# Patient Record
Sex: MALE | Race: WHITE | NOT HISPANIC OR LATINO | Employment: OTHER | URBAN - METROPOLITAN AREA
[De-identification: names, ages, dates, MRNs, and addresses within clinical notes are randomized per-mention and may not be internally consistent; named-entity substitution may affect disease eponyms.]

---

## 2018-09-07 ENCOUNTER — OFFICE VISIT (OUTPATIENT)
Dept: URGENT CARE | Facility: CLINIC | Age: 78
End: 2018-09-07
Payer: MEDICARE

## 2018-09-07 VITALS
TEMPERATURE: 98 F | HEART RATE: 56 BPM | SYSTOLIC BLOOD PRESSURE: 176 MMHG | HEIGHT: 66 IN | DIASTOLIC BLOOD PRESSURE: 70 MMHG | WEIGHT: 161 LBS | RESPIRATION RATE: 18 BRPM | OXYGEN SATURATION: 96 % | BODY MASS INDEX: 25.88 KG/M2

## 2018-09-07 DIAGNOSIS — S61.215A LACERATION OF LEFT RING FINGER WITHOUT FOREIGN BODY WITHOUT DAMAGE TO NAIL, INITIAL ENCOUNTER: ICD-10-CM

## 2018-09-07 DIAGNOSIS — S62.645B OPEN NONDISPLACED FRACTURE OF PROXIMAL PHALANX OF LEFT RING FINGER, INITIAL ENCOUNTER: ICD-10-CM

## 2018-09-07 DIAGNOSIS — S69.92XA FINGER INJURY, LEFT, INITIAL ENCOUNTER: Primary | ICD-10-CM

## 2018-09-07 PROCEDURE — 90714 TD VACC NO PRESV 7 YRS+ IM: CPT | Mod: S$GLB,,, | Performed by: NURSE PRACTITIONER

## 2018-09-07 PROCEDURE — 12042 INTMD RPR N-HF/GENIT2.6-7.5: CPT | Mod: S$GLB,,, | Performed by: NURSE PRACTITIONER

## 2018-09-07 PROCEDURE — 90471 IMMUNIZATION ADMIN: CPT | Mod: S$GLB,,, | Performed by: NURSE PRACTITIONER

## 2018-09-07 PROCEDURE — 99214 OFFICE O/P EST MOD 30 MIN: CPT | Mod: 25,S$GLB,, | Performed by: NURSE PRACTITIONER

## 2018-09-07 RX ORDER — HYDROCODONE BITARTRATE AND ACETAMINOPHEN 5; 325 MG/1; MG/1
1 TABLET ORAL EVERY 8 HOURS PRN
Qty: 10 TABLET | Refills: 0 | Status: SHIPPED | OUTPATIENT
Start: 2018-09-07 | End: 2018-11-22

## 2018-09-07 RX ORDER — CEPHALEXIN 500 MG/1
500 CAPSULE ORAL EVERY 8 HOURS
Qty: 15 CAPSULE | Refills: 0 | Status: SHIPPED | OUTPATIENT
Start: 2018-09-07 | End: 2018-09-12

## 2018-09-07 NOTE — PROGRESS NOTES
"Subjective:       Patient ID: Bao Mckeon is a 78 y.o. male.    Vitals:  height is 5' 6" (1.676 m) and weight is 73 kg (161 lb). His temperature is 98.3 °F (36.8 °C). His blood pressure is 176/70 (abnormal) and his pulse is 56 (abnormal). His respiration is 18 and oxygen saturation is 96%.     Chief Complaint: Laceration    Patient presents with laceration to left 4th digit after his hand became stuck between sliding door and a shopping cart in the last hour.  Here with his neighbor.  He is a Pianist by profession.        Laceration    The incident occurred less than 1 hour ago. The laceration is located on the left hand. The laceration is 4 cm in size. The laceration mechanism was a metal edge. The pain is at a severity of 4/10. The pain is moderate. The pain has been constant since onset. He reports no foreign bodies present. His tetanus status is out of date.     Review of Systems   Constitution: Negative for chills and fever.   HENT: Negative for sore throat.    Eyes: Negative for blurred vision.   Cardiovascular: Negative for chest pain.   Respiratory: Negative for shortness of breath.    Skin: Negative for rash.   Musculoskeletal: Negative for back pain and joint pain.   Gastrointestinal: Negative for abdominal pain, diarrhea, nausea and vomiting.   Neurological: Negative for headaches.   Psychiatric/Behavioral: The patient is not nervous/anxious.        Objective:      Physical Exam   Constitutional: He is oriented to person, place, and time. He appears well-developed and well-nourished.   HENT:   Head: Normocephalic and atraumatic. Head is without abrasion, without contusion and without laceration.   Right Ear: External ear normal.   Left Ear: External ear normal.   Nose: Nose normal.   Mouth/Throat: Oropharynx is clear and moist.   Eyes: Conjunctivae, EOM and lids are normal. Pupils are equal, round, and reactive to light.   Neck: Trachea normal, full passive range of motion without pain and phonation " normal. Neck supple.   Cardiovascular: Normal rate, regular rhythm, normal heart sounds and intact distal pulses.   Pulmonary/Chest: Effort normal and breath sounds normal. No stridor. No respiratory distress.   Musculoskeletal: Normal range of motion.        Left hand: He exhibits laceration. He exhibits normal range of motion, no tenderness, no bony tenderness, normal two-point discrimination, normal capillary refill, no deformity and no swelling. Normal sensation noted. Normal strength noted.        Hands:  Neurological: He is alert and oriented to person, place, and time.   Skin: Skin is warm and dry. Capillary refill takes less than 2 seconds. Laceration noted. No abrasion, no bruising, no burn, no ecchymosis, no lesion and no rash noted. No erythema.   Psychiatric: He has a normal mood and affect. His speech is normal and behavior is normal. Judgment and thought content normal. Cognition and memory are normal.   Nursing note and vitals reviewed.          EXAMINATION:  XR HAND COMPLETE 3 VIEW LEFT    CLINICAL HISTORY:  Unspecified injury of left wrist, hand and finger(s), initial encounter    TECHNIQUE:  PA, lateral, and oblique views of the left hand were performed.    COMPARISON:  None    FINDINGS:  There is a nondisplaced longitudinally oriented fracture of the 4th proximal phalanx with overlying soft tissue irregularity.  No periarticular osteopenia or erosion.  Moderate degenerative changes are seen throughout the IP and base of thumb joints.      Impression       As above.      Electronically signed by: Nathaniel Porter MD  Date: 09/07/2018  Time: 15:49       Assessment:       1. Finger injury, left, initial encounter    2. Laceration of left ring finger without foreign body without damage to nail, initial encounter    3. Open nondisplaced fracture of proximal phalanx of left ring finger, initial encounter        Plan:       Case discussed with Dr. Canada (Orth[facility] Hand Specialist) who will follow him in  clinic.  Okay to suture in clinic and stabilize with Aluminum Finger Splint, advised by MD to have him remove in 3 days to prevent stiffness, follow up in clinic next week, and start on Prophylactic Keflex.    Finger injury, left, initial encounter  -     XR HAND COMPLETE 3 VIEW LEFT; Future; Expected date: 09/07/2018  -     (In Office Administered) Td Vaccine  -     cephALEXin (KEFLEX) 500 MG capsule; Take 1 capsule (500 mg total) by mouth every 8 (eight) hours. for 5 days  Dispense: 15 capsule; Refill: 0  -     Ambulatory referral to Orthopedics  -     HYDROcodone-acetaminophen (NORCO) 5-325 mg per tablet; Take 1 tablet by mouth every 8 (eight) hours as needed for Pain.  Dispense: 10 tablet; Refill: 0    Laceration of left ring finger without foreign body without damage to nail, initial encounter  -     Laceration Repair  -     (In Office Administered) Td Vaccine  -     cephALEXin (KEFLEX) 500 MG capsule; Take 1 capsule (500 mg total) by mouth every 8 (eight) hours. for 5 days  Dispense: 15 capsule; Refill: 0  -     Ambulatory referral to Orthopedics  -     HYDROcodone-acetaminophen (NORCO) 5-325 mg per tablet; Take 1 tablet by mouth every 8 (eight) hours as needed for Pain.  Dispense: 10 tablet; Refill: 0    Open nondisplaced fracture of proximal phalanx of left ring finger, initial encounter  -     Ambulatory referral to Orthopedics  -     HYDROcodone-acetaminophen (NORCO) 5-325 mg per tablet; Take 1 tablet by mouth every 8 (eight) hours as needed for Pain.  Dispense: 10 tablet; Refill: 0      Patient Instructions   Follow up with Dr. Canada or one of his associates next week.  Call [phone] to schedule.  Take splint off in three days to move finger and then re apply to avoid stiffness.  Start antibiotic and take until completion.                           Laceration Repair   If your condition worsens or fails to improve we recommend that you receive another evaluation at the ER immediately or contact your  PCP to discuss your concerns or return here. You must understand that you've received an urgent care treatment only and that you may be released before all your medical problems are known or treated. You the patient will arrange for followup care as instructed.   Use the prescription antibiotic ointment for 2-3 days only. Clean the wound twice a day with betadiene and apply the ointment. After that, only use a dry bandage as the ointment will keep the laceration too moist.   Monitor for signs of infection such as redness, drainage, increase swelling or increase pain.   If you were given narcotics for pain do not drive or operate heavy equipment or machinery.   Tylenol or ibuprofen can also be used as directed for pain unless you have an allergy to them or medical condition such as stomach ulcers, kidney or liver disease or blood thinners etc for which you should not be taking these type of medications.       Finger Fracture, Open  You have a broken finger (fracture) with a nearby cut, puncture, or deep scrape. This causes local pain, swelling, and bruising. Because of the open injury, you are at risk for infection in the skin and bone. You will take antibiotics to lower the risk for infection.   This injury usually takes about 4 weeks to heal. Finger injuries are often treated with a splint or cast, or by taping the injured finger to the next one (edward taping). This protects the injured finger and holds the bone in position while it heals. More serious fractures may need surgery.  If the fingernail has been severely injured, it will probably fall off in 1 to 2 weeks. A new fingernail will usually start to grow back within a month.  Home care  Follow these guidelines when caring for yourself at home:  · Keep your hand elevated to reduce pain and swelling. When sitting or lying down keep your arm above the level of your heart. You can do this by placing your arm on a pillow that rests on your chest or on a pillow at  your side. This is most important during the first 2 days (48 hours) after the injury.  · Put an ice pack on the injured area. Do this for 20 minutes every 1 to 2 hours the first day for pain relief. You can make an ice pack by wrapping a plastic bag of ice cubes in a thin towel. As the ice melts, be careful that the cast or splint doesnt get wet. Continue using the ice pack 3 to 4 times a day until the pain and swelling go away.  · Keep the cast or splint completely dry at all times. Bathe with your cast or splint out of the water. Protect it with a large plastic bag, rubber-banded at the top end. If a fiberglass cast or splint gets wet, you can dry it with a hair dryer.  · You may use acetaminophen or ibuprofen to control pain, unless another pain medicine was prescribed. If you have chronic liver or kidney disease, talk with your healthcare provider before using these medicines. Also talk with your provider if youve had a stomach ulcer or gastrointestinal bleeding.  · If edward tape was applied and it becomes wet or dirty, change it. You may replace it with paper, plastic, or cloth tape. Cloth tape and paper tapes must be kept dry. Keep the edward tape in place for at least 4 weeks.  · Take all antibiotics until you have finished them.  · Dont put creams or objects under the cast if you have itching.  Follow-up care  Follow up with your healthcare provider, or as advised. This is to make sure the bone is healing the way it should.  X-rays may be taken. You will be told of any new findings that may affect your care.  When to seek medical advice  Call your healthcare provider right away if any of these occur:  · The cast or splint cracks  · The plaster cast or splint becomes wet or soft  · The fiberglass cast or splint stays wet for more than 24 hours  · Pain or swelling gets worse  · Tightness or pressure under the cast or splint gets worse  · Finger becomes cold, blue, numb, or tingly  · You cant move your  finger  · Redness, warmth, swelling, drainage from the wound, or foul odor from a cast or splint  · Fever of 100.4ºF (38ºC) or higher, or as directed by your healthcare provider   Date Last Reviewed: 2/1/2017  © 6416-8369 [employer]. [address] [place], [place] [address]. All rights reserved. This information is not intended as a substitute for professional medical care. Always follow your healthcare professional's instructions.

## 2018-09-07 NOTE — PATIENT INSTRUCTIONS
Follow up with Dr. Canada or one of his associates next week.  Call [phone] to schedule.  Take splint off in three days to move finger and then re apply to avoid stiffness.  Start antibiotic and take until completion.                           Laceration Repair   If your condition worsens or fails to improve we recommend that you receive another evaluation at the ER immediately or contact your PCP to discuss your concerns or return here. You must understand that you've received an urgent care treatment only and that you may be released before all your medical problems are known or treated. You the patient will arrange for followup care as instructed.   Use the prescription antibiotic ointment for 2-3 days only. Clean the wound twice a day with betadiene and apply the ointment. After that, only use a dry bandage as the ointment will keep the laceration too moist.   Monitor for signs of infection such as redness, drainage, increase swelling or increase pain.   If you were given narcotics for pain do not drive or operate heavy equipment or machinery.   Tylenol or ibuprofen can also be used as directed for pain unless you have an allergy to them or medical condition such as stomach ulcers, kidney or liver disease or blood thinners etc for which you should not be taking these type of medications.       Finger Fracture, Open  You have a broken finger (fracture) with a nearby cut, puncture, or deep scrape. This causes local pain, swelling, and bruising. Because of the open injury, you are at risk for infection in the skin and bone. You will take antibiotics to lower the risk for infection.   This injury usually takes about 4 weeks to heal. Finger injuries are often treated with a splint or cast, or by taping the injured finger to the next one (edward taping). This protects the injured finger and holds the bone in position while it heals. More serious fractures may need surgery.  If the fingernail has been severely  injured, it will probably fall off in 1 to 2 weeks. A new fingernail will usually start to grow back within a month.  Home care  Follow these guidelines when caring for yourself at home:  · Keep your hand elevated to reduce pain and swelling. When sitting or lying down keep your arm above the level of your heart. You can do this by placing your arm on a pillow that rests on your chest or on a pillow at your side. This is most important during the first 2 days (48 hours) after the injury.  · Put an ice pack on the injured area. Do this for 20 minutes every 1 to 2 hours the first day for pain relief. You can make an ice pack by wrapping a plastic bag of ice cubes in a thin towel. As the ice melts, be careful that the cast or splint doesnt get wet. Continue using the ice pack 3 to 4 times a day until the pain and swelling go away.  · Keep the cast or splint completely dry at all times. Bathe with your cast or splint out of the water. Protect it with a large plastic bag, rubber-banded at the top end. If a fiberglass cast or splint gets wet, you can dry it with a hair dryer.  · You may use acetaminophen or ibuprofen to control pain, unless another pain medicine was prescribed. If you have chronic liver or kidney disease, talk with your healthcare provider before using these medicines. Also talk with your provider if youve had a stomach ulcer or gastrointestinal bleeding.  · If edward tape was applied and it becomes wet or dirty, change it. You may replace it with paper, plastic, or cloth tape. Cloth tape and paper tapes must be kept dry. Keep the edward tape in place for at least 4 weeks.  · Take all antibiotics until you have finished them.  · Dont put creams or objects under the cast if you have itching.  Follow-up care  Follow up with your healthcare provider, or as advised. This is to make sure the bone is healing the way it should.  X-rays may be taken. You will be told of any new findings that may affect your  care.  When to seek medical advice  Call your healthcare provider right away if any of these occur:  · The cast or splint cracks  · The plaster cast or splint becomes wet or soft  · The fiberglass cast or splint stays wet for more than 24 hours  · Pain or swelling gets worse  · Tightness or pressure under the cast or splint gets worse  · Finger becomes cold, blue, numb, or tingly  · You cant move your finger  · Redness, warmth, swelling, drainage from the wound, or foul odor from a cast or splint  · Fever of 100.4ºF (38ºC) or higher, or as directed by your healthcare provider   Date Last Reviewed: 2/1/2017  © 2801-3153 [employer]. [address] [place] [address], [place] [address]. All rights reserved. This information is not intended as a substitute for professional medical care. Always follow your healthcare professional's instructions.

## 2018-09-07 NOTE — PROCEDURES
"Laceration Repair  Date/Time: 2018 4:49 PM  Performed by: Dotty Garcia NP  Authorized by: Dotty Garcia NP   Consent Done: Yes  Consent: Verbal consent obtained.  Risks and benefits: risks, benefits and alternatives were discussed  Consent given by: patient  Patient understanding: patient states understanding of the procedure being performed  Site marked: the operative site was marked  Imaging studies: imaging studies available  Patient identity confirmed: , name and verbally with patient  Time out: Immediately prior to procedure a "time out" was called to verify the correct patient, procedure, equipment, support staff and site/side marked as required.  Body area: upper extremity  Location details: right ring finger  Laceration length: 4 cm  Foreign bodies: no foreign bodies  Tendon involvement: none  Nerve involvement: none  Vascular damage: no  Anesthesia: local infiltration    Anesthesia:  Local Anesthetic: lidocaine 1% without epinephrine  Anesthetic total: 1 mL  Preparation: Patient was prepped and draped in the usual sterile fashion.  Irrigation solution: saline  Irrigation method: jet lavage and syringe  Amount of cleaning: extensive  Debridement: none  Degree of undermining: none  Skin closure: 4-0 nylon  Number of sutures: 12  Technique: complex  Approximation: close  Approximation difficulty: complex  Dressing: antibiotic ointment and splint for protection  Patient tolerance: Patient tolerated the procedure well with no immediate complications  Comments: Hemostasis Achieved.  Patient splinted with Aluminum Finger Splint in Flexion.  NV intact.        "

## 2018-09-10 ENCOUNTER — TELEPHONE (OUTPATIENT)
Dept: ORTHOPEDICS | Facility: CLINIC | Age: 78
End: 2018-09-10

## 2018-09-10 ENCOUNTER — TELEPHONE (OUTPATIENT)
Dept: URGENT CARE | Facility: CLINIC | Age: 78
End: 2018-09-10

## 2018-09-10 NOTE — TELEPHONE ENCOUNTER
----- Message from Sabrina Lawson sent at 9/10/2018  3:29 PM CDT -----  Contact: pt            Name of Who is Calling: Bao      What is the request in detail: requesting a call back in reference to scheduling to see Dr canada at an earlier date than Oct. Pt states that CORAZON Garcia spoke with Dr Canada and requested that he be seen soon as possible. Please call and advise      Can the clinic reply by [facility]: no      What Number to Call Back if not in [facility]: [phone]

## 2018-09-10 NOTE — TELEPHONE ENCOUNTER
----- Message from Jozef Coreas sent at 9/10/2018  2:45 PM CDT -----  Contact: patient      Name of Who is Calling: Bao Mckeon      What is the request in detail: pt is requesting a call back in reference to being seen for fractured left ring finger.  Referral is in the system from UC visit.   spoke with Dr Canada who advised on treatment and to f/u with him in clinic.      Can the clinic reply by [facility]: no      What Number to Call Back if not in [facility]: [phone]

## 2018-09-10 NOTE — TELEPHONE ENCOUNTER
----- Message from Dulce Horta sent at 9/10/2018  2:58 PM CDT -----  Contact: [phone]/self  Pt states he need to be seen within a week for a URGENT CARE follow-up for a fractured finger.     Please call and advise

## 2018-09-10 NOTE — TELEPHONE ENCOUNTER
I spoke with the patient and made him a new patient appointment. He was made aware of date, time and location.

## 2018-09-12 ENCOUNTER — TELEPHONE (OUTPATIENT)
Dept: ORTHOPEDICS | Facility: CLINIC | Age: 78
End: 2018-09-12

## 2018-09-12 NOTE — TELEPHONE ENCOUNTER
----- Message from Makenna Garcia sent at 9/12/2018  9:56 AM CDT -----  Name of Who is Calling: #SHAHRZAD SUAREZ RADHA [[id number]]      What is the request in detail: Pt has stiches in left ring finger and wants further instructions on how to care for the stiches. Stiches were done in urgent care and was advised to contact the   Dr.     Can the clinic reply by [facility]: Yes      [facility]: [phone]

## 2018-09-14 ENCOUNTER — TELEPHONE (OUTPATIENT)
Dept: ORTHOPEDICS | Facility: CLINIC | Age: 78
End: 2018-09-14

## 2018-09-14 NOTE — TELEPHONE ENCOUNTER
Spoke with patient to inform him of wound care instructions. Verbalized understanding.    ----- Message from Alba Melara sent at 9/14/2018  2:21 PM CDT -----  Contact: self/[phone]  Patient called to speak with the nurse about cleaning his wound with peroxide.    Please call and advise.

## 2018-09-17 ENCOUNTER — HOSPITAL ENCOUNTER (OUTPATIENT)
Dept: RADIOLOGY | Facility: HOSPITAL | Age: 78
Discharge: HOME | End: 2018-09-17
Attending: ORTHOPAEDIC SURGERY
Payer: MEDICARE

## 2018-09-17 ENCOUNTER — OFFICE VISIT (OUTPATIENT)
Dept: ORTHOPEDICS | Facility: CLINIC | Age: 78
End: 2018-09-17
Payer: MEDICARE

## 2018-09-17 VITALS — HEIGHT: 66 IN | BODY MASS INDEX: 25.88 KG/M2 | WEIGHT: 161 LBS

## 2018-09-17 DIAGNOSIS — M79.646 PAIN OF FINGER, UNSPECIFIED LATERALITY: Primary | ICD-10-CM

## 2018-09-17 DIAGNOSIS — M79.646 PAIN OF FINGER, UNSPECIFIED LATERALITY: ICD-10-CM

## 2018-09-17 PROCEDURE — 99203 OFFICE O/P NEW LOW 30 MIN: CPT | Mod: S$PBB,,, | Performed by: ORTHOPAEDIC SURGERY

## 2018-09-17 PROCEDURE — 99213 OFFICE O/P EST LOW 20 MIN: CPT | Mod: PBBFAC,25,PN | Performed by: ORTHOPAEDIC SURGERY

## 2018-09-17 PROCEDURE — 99999 PR PBB SHADOW E&M-EST. PATIENT-LVL III: CPT | Mod: PBBFAC,,, | Performed by: ORTHOPAEDIC SURGERY

## 2018-09-17 PROCEDURE — 1101F PT FALLS ASSESS-DOCD LE1/YR: CPT | Mod: CPTII,,, | Performed by: ORTHOPAEDIC SURGERY

## 2018-09-17 PROCEDURE — 73140 X-RAY EXAM OF FINGER(S): CPT | Mod: 26,LT,, | Performed by: RADIOLOGY

## 2018-09-17 PROCEDURE — 73140 X-RAY EXAM OF FINGER(S): CPT | Mod: TC,FY

## 2018-09-17 NOTE — LETTER
September 17, 2018        Dotty Garcia, NP  [address] [place] [address]             [facility]  [address] [place] [address]  [facility] [address]  Phone: [phone]   Patient: Bao Mckeon   MR Number: [id number]   YOB: 1940   Date of Visit: 9/17/2018       Dear Dr. Garcia:    Thank you for referring Bao Mckeon to me for evaluation. Below are the relevant portions of my assessment and plan of care.            If you have questions, please do not hesitate to call me. I look forward to following Bao along with you.    Sincerely,      Marcellus Canada Jr., MD           CC  No Recipients

## 2018-09-17 NOTE — PROGRESS NOTES
INITIAL VISIT HISTORY:  An 78-year-old male sustained injury to his left ring   finger when it was smashed in some type of door mechanism 10 days ago.  He was   seen in the Urgent Care, noted to have a fracture of the left ring finger   proximal phalanx with open wound dorsally.  The wound was repaired, placed in a   splint and referred for treatment today.  He has been on antibiotics.  Some mild   pain is reported.    PAST MEDICAL HISTORY:  Significant for arthritis.    PAST SURGICAL HISTORY:  Includes hernia repair, cataract surgery.    FAMILY HISTORY:  Positive for cancer.    SOCIAL HISTORY:  The patient does not smoke, drinks alcohol occasionally.    REVIEW OF SYSTEMS:  Negative fever, chills, rashes.    CURRENT MEDICATIONS:  Reviewed on chart.    ALLERGIES:  None.    PHYSICAL EXAMINATION:  GENERAL:  Well-developed, well-nourished male in no acute distress, alert and   oriented x3.  EXTREMITIES:  Examination of the left hand, demonstrates a dorsal wound   laceration type, which is sort of jagged in chevron shape.  Sutures are in   place.  There is some bruising and swelling, slight tenderness.  Range of motion   of the joint is limited secondary to pain, but he does have active extension.    Tendon function is intact.  Sensation intact.  No evidence of infection.    X-RAYS:  AP and lateral, left ring finger, demonstrate nondisplaced fracture of   proximal phalanx of the left ring finger.    IMPRESSION:  Open fracture, left ring finger.    PLAN:  We have elected to leave the sutures in for an additional week, but I   would like him to start some gentle range of motion, maybe some warm water   soaks, light activities.  Finish up the antibiotics.  Follow up in one week for   suture removal.      RICKIE  dd: 09/17/2018 14:21:26 (CDT)  td: 09/18/2018 03:09:34 (CDT)  Doc ID   #[id number]  Job ID #[id number]    CC:

## 2018-09-24 ENCOUNTER — OFFICE VISIT (OUTPATIENT)
Dept: ORTHOPEDICS | Facility: CLINIC | Age: 78
End: 2018-09-24
Payer: MEDICARE

## 2018-09-24 VITALS — WEIGHT: 161 LBS | BODY MASS INDEX: 25.88 KG/M2 | HEIGHT: 66 IN

## 2018-09-24 DIAGNOSIS — M79.646 PAIN OF FINGER, UNSPECIFIED LATERALITY: Primary | ICD-10-CM

## 2018-09-24 PROCEDURE — 99999 PR PBB SHADOW E&M-EST. PATIENT-LVL III: CPT | Mod: PBBFAC,,, | Performed by: ORTHOPAEDIC SURGERY

## 2018-09-24 PROCEDURE — 99213 OFFICE O/P EST LOW 20 MIN: CPT | Mod: S$PBB,,, | Performed by: ORTHOPAEDIC SURGERY

## 2018-09-24 PROCEDURE — 99213 OFFICE O/P EST LOW 20 MIN: CPT | Mod: PBBFAC,PN | Performed by: ORTHOPAEDIC SURGERY

## 2018-09-24 PROCEDURE — 1101F PT FALLS ASSESS-DOCD LE1/YR: CPT | Mod: CPTII,,, | Performed by: ORTHOPAEDIC SURGERY

## 2018-09-24 NOTE — PROGRESS NOTES
HISTORY OF PRESENT ILLNESS:  Mr. Mckeon is about 2-1/2 weeks out from open   fracture of the left ring finger proximal phalanx.  He is doing well with wound   care, having some pain and stiffness.    PHYSICAL EXAMINATION:  LEFT HAND:  The wound looks good.  Sutures are in place, well healed.  No   evidence of infection.  Range of motion is very limited, however.    No x-rays today.    PLAN:  Sutures removed today.  Instructed in appropriate wound care.  Also, I   have stressed the importance that he start moving his fingers and discontinue   the splint at home.  He seems a little hesitant about removing the splint.  We   will also put in an order for [employer] therapy, OT to start some range of motion   as soon as possible.  I am concerned about the stiffness.  Follow up with me in   1-2 weeks for close followup of the finger and motion.      RICKIE  dd: 09/24/2018 14:46:09 (CDT)  td: 09/25/2018 04:46:06 (CDT)  Doc ID   #[id number]  Job ID #[id number]    CC:

## 2018-10-05 ENCOUNTER — CLINICAL SUPPORT (OUTPATIENT)
Dept: REHABILITATION | Facility: HOSPITAL | Age: 78
End: 2018-10-05
Attending: ORTHOPAEDIC SURGERY
Payer: MEDICARE

## 2018-10-05 DIAGNOSIS — M79.646 PAIN OF FINGER, UNSPECIFIED LATERALITY: ICD-10-CM

## 2018-10-05 DIAGNOSIS — R29.898 WEAKNESS OF LEFT HAND: ICD-10-CM

## 2018-10-05 DIAGNOSIS — M25.60 DECREASED RANGE OF MOTION: ICD-10-CM

## 2018-10-05 DIAGNOSIS — Z78.9 IMPAIRED INSTRUMENTAL ACTIVITIES OF DAILY LIVING: ICD-10-CM

## 2018-10-05 DIAGNOSIS — M79.644 PAIN OF FINGER OF RIGHT HAND: ICD-10-CM

## 2018-10-05 DIAGNOSIS — M79.645 PAIN OF FINGER OF LEFT HAND: ICD-10-CM

## 2018-10-05 PROCEDURE — G8984 CARRY CURRENT STATUS: HCPCS | Mod: CJ,PN

## 2018-10-05 PROCEDURE — 97165 OT EVAL LOW COMPLEX 30 MIN: CPT | Mod: PN

## 2018-10-05 PROCEDURE — G8985 CARRY GOAL STATUS: HCPCS | Mod: CJ,PN

## 2018-10-05 NOTE — PATIENT INSTRUCTIONS
DIP Flexion (Active Blocked)        Hold ______ finger firmly at the middle so that only the tip joint can bend. Hold ____ seconds.  Repeat ____ times. Do ____ sessions per day.    Copyright © I. All rights reserved.   PIP Flexion (Active Blocked)        Hold large knuckle straight using other hand. Bend middle joint of ______ finger as far as possible. Hold ____ seconds.  Repeat ____ times. Do ____ sessions per day.  Activity: Curl fingers around a jar cap.*    Copyright © I. All rights reserved.   Flexor Tendon Gliding (Active Hook Fist)        With fingers and knuckles straight, bend middle and tip joints. Do not bend large knuckles.  Repeat ____ times. Do ____ sessions per day.    Copyright © I. All rights reserved.   Flexor Tendon Gliding (Active Full Fist)        Straighten all fingers, then make a fist, bending all joints.  Repeat ____ times. Do ____ sessions per day.    Copyright © I. All rights reserved.   Edema Reduction (Retrograde Massage)        A. Enclose tip of finger with other hand and slide toward wrist.  B. For larger areas, massage toward the body in one direction only.  Repeat ____ times. Do ____ sessions per day.    Copyright © I. All rights reserved.

## 2018-10-05 NOTE — PLAN OF CARE
".  Patient: Bao Mckeon  Date of Evaluation: 10/05/2018  Referring Physician: French  Diagnosis: L  RF Pprox pahlanx fracture    Referral Orders: Eval and treat    Start Time: 1015am  End Time: 11am  Total Time: 45 min  Group Time:     Age: 78 y.o.  Sex: male  Hand dominance: right    Occupation: Pianist, still plays in restraunts 1x a week  Working presently: Yes  Last time worked: Prior to injury on Sept 7th  Workmen's Compensation: No    Date of Injury: 9/7/18  Date of Surgery: NA splint for a week  Involved areas: left ring finger    Mechanism of Injury: Smashed finger in door  Past Medical History:   Diagnosis Date    Arthritis      Current Outpatient Medications   Medication Sig    HYDROcodone-acetaminophen (NORCO) 5-325 mg per tablet Take 1 tablet by mouth every 8 (eight) hours as needed for Pain.    triamcinolone acetonide 0.1% (KENALOG) 0.1 % cream      No current facility-administered medications for this visit.      Review of patient's allergies indicates:  No Known Allergies  X-Ray Results: non displaced prox phalanx fracture    Subjective  Bao reports "I am doing better. "    Functional Pain Scale Rating 0-10: 1/10  Location: hands  Description: Dull  Activities which increase pain: Bending  Activities which decrease pain: rest    Bao's goals for therapy are: Improve ROM, Improve strenght, Improve , Improve pinch, Improve functional hand use and Play piano      Objective    Observation: Joint stiffness and Edema present;     Sensation: grossly intact    Wound Assessment: Stitches removed. Site closed.  Location: finger L RF    Edema: Circumferential measurements: as follows:   L RF prox phalanx 7cm R RF 6 cm    Range of Motion: left Active  (Ext/Flex) Index Middle Ring Small   MP 0/80 0/85 0/93 0/90   PIP 0/105 0/105 -10/98 0/95   DIP 0/55 0/55 0/30 0/70   TUBBS 240 245 211 250     Thumb Opposition: WNL    Wrist Ext/Flex: WFL  Wrist RD/UD: WFL  Supination/Pronation: WFL  Elbow " "extension/flexion: WFL     Strength: (NARA Dynamometer in lbs.) Average 3 trials, Position II  Right: 60#  Left: 3#    Pinch Strength: (Pinch Gauge in psi's), Average 3 trials  Koenig Pinch R) 19psi's   L) 17psi's  Fine Elijah Coordination Tests:   9 hole Peg Test R) 23"   L) 27"  Able to remove and replace all 9 pegs in hand no dropping.    Functional Limitations: Patient presents with the following functional Limitations:   Self Care / ADL: I    Work/Activities: Playing piano    Leisure: Piano    Treatment included: OT evaluation and instruction in written HEP including (Hand Therapy/Finger Exercises) blocking TEG's and edema management.    Repetitions as tolerated  Profile and History Assessment of Occupational Performance Level of Clinical Decision Making Complexity Score   Occupational Profile:   Bao Mckeon is a 78 y.o. male who lives alone and is currently employed as musician. Bao Mckeon has difficulty with  na  piano playing  affecting his/her daily functional abilities. His/her main goal for therapy is full functional use of hand.     Comorbidities:   arthritis    Medical and Therapy History Review:   Brief               Performance Deficits    Physical:  Joint Mobility  Edema   Strength  Pinch Strength  Fine Motor Coordination  Pain    Cognitive:  No Deficits    Psychosocial:    No Deficits     Clinical Decision Making:  low    Assessment Process:  Problem-Focused Assessments    Modification/Need for Assistance:  Not Necessary    Intervention Selection:  Limited Treatment Options       low  Based on PMHX, co morbidities , data from assessments and functional level of assistance required with task and clinical presentation directly impacting function.     CMS Impairment/Limitation/Restriction for FOTO Hand Survey Carry  Status Limitation G-Code CMS Severity Modifier  Intake 61% 39% Current Status CJ - At least 20 percent but less than 40 percent  Predicted 75% 25% Goal Status+ CJ - At least " 20 percent but less than 40 percent    Assessment  Treatment Diagnosis/ Problem List LUE Decreased ROM, Decreased  strength, Decreased pinch strength, Decreased functional hand use, Edema and Joint Stiffness    Short Term Goals 4 weeks  1.Increase TUBBS 25 degrees for increased I with daily tasks  2. Increase  to 45# for increase I with ADL's  3. Edema decreased to 6.5cm  LT.  within 5# LvsR  2. AROM WNL to perform all daily tasks.  3. Pt resumes playing piano  4. Edema minimal and does not interfere with ROM  5. I with HEP  56. I with all daily tasks    Plan  Bao Mckeon to be treated by Occupational Therapy 2 times per week  during the certification period from 10/5//18 to 18 to achieve the established goals.     Treatment to include: Paraffin, Fluidotherapy, Manual therapy/joint mobilizations, US 3 mhz, Therapeutic exercises/activities., Strengthening and Edema Control, as well as any other treatments deemed necessary based on the patient's needs or progress.     I certify the need for these services furnished under this plan of treatment and while under my care.     ____________________________________                         __________________  Physician/Referring Practitioner                                               Date of Signature

## 2018-10-07 ENCOUNTER — PATIENT MESSAGE (OUTPATIENT)
Dept: INTERNAL MEDICINE | Facility: CLINIC | Age: 78
End: 2018-10-07

## 2018-10-08 ENCOUNTER — CLINICAL SUPPORT (OUTPATIENT)
Dept: REHABILITATION | Facility: HOSPITAL | Age: 78
End: 2018-10-08
Attending: ORTHOPAEDIC SURGERY
Payer: MEDICARE

## 2018-10-08 DIAGNOSIS — M25.60 DECREASED RANGE OF MOTION: ICD-10-CM

## 2018-10-08 DIAGNOSIS — R29.898 WEAKNESS OF LEFT HAND: ICD-10-CM

## 2018-10-08 DIAGNOSIS — Z78.9 IMPAIRED INSTRUMENTAL ACTIVITIES OF DAILY LIVING: ICD-10-CM

## 2018-10-08 PROCEDURE — 97110 THERAPEUTIC EXERCISES: CPT | Mod: PN

## 2018-10-08 PROCEDURE — 97140 MANUAL THERAPY 1/> REGIONS: CPT | Mod: PN

## 2018-10-08 NOTE — PROGRESS NOTES
"  Occupational Therapy Daily Treatment Note     Date: 10/8/2018  Name: Bao Mckeon  Clinic Number: [id number]    Therapy Diagnosis: No diagnosis found.  Physician: Marcellus Canada Jr., *    Physician Orders: Eval and treat  Medical Diagnosis: L RF prox phalanx  Surgical Procedure and Date: NA  Evaluation Date: 10/5/18  Plan of Care Certification Period: 12/5/18  Date of Return to MD: 10/11/18    Visit # / Visits authorized:2 of 20 Exp 12/31/18  Time In:105pm   Time Out: 2pm   Total Billable Time: 50 minutes    Precautions:  Standard      Subjective     Pt reports: I was doing my exercises over the weekend  he was compliant with home exercise program given last session.   Response to previous treatment:Positive.   Functional change: Appears to have an increase in ROM and swelling is improving.    Pain: 4/10  Location: left finger RF    Objective     Bao received the following manual therapy techniques for 10 minutes:   -Retrograde and scar massage. To RF, also gentle grade one AP joint mobs to IP joint within tolerance, not to point of pain.  Scar is still sensitive.    Bao received therapeutic exercises for 40 minutes including:  -PROM to MP IP an DIP L RF hold at end ROM for IP an DIP for 20-30"   Blocking ex for IP and DIP flexion 2x 20 reps  Digit Abd and adduction 2x20  TGE's hook flat and composite fist. 2x20  Table top digit extension 2x20  Ergo Gripper 1 yellow and 1 red band 20 x 3; hold 30" 4x  Chinese meditation balls 2 min x2  9 hole peg test, remove and replace in hand manipulation 4 x      L 35#  AROM L RF MP 0/92 PIP -10/105 DIP 0/40      Home Exercises and Education Provided     Education provided:   - Cont with HEP, pt also looking into purchasing silver meditation balls  - Progress towards goals     Written Home Exercises Provided: Patient instructed to cont prior HEP.  Exercises were reviewed and Bao was able to demonstrate them prior to the end of the session.  Bao " demonstrated good  understanding of the HEP provided.   .   See EMR under Patient Instructions for exercises provided prior visit.        Assessment     Pt would continue to benefit from skilled OT. He has made progress with ROM and strength. Edema is still present but appears to be decreasing. He has a goal of returing to playing the piano and has excellent potential to reach that goal.     Bao is progressing well towards his goals and there are no updates to goals at this time. Pt prognosis is Excellent.     Pt will continue to benefit from skilled outpatient occupational therapy to address the deficits listed in the problem list on initial evaluation provide pt/family education and to maximize pt's level of independence in the home and community environment.     Anticipated barriers to occupational therapy: NA    Pt's spiritual, cultural and educational needs considered and pt agreeable to plan of care and goals.    Goals:  Full functioonal use of hand.    Plan   Cont with OT 2x a week eventually decreasing to 1x a week as appropriate for he POC period ending 12/5/18    CHANI Temple

## 2018-10-11 ENCOUNTER — OFFICE VISIT (OUTPATIENT)
Dept: ORTHOPEDICS | Facility: CLINIC | Age: 78
End: 2018-10-11
Attending: ORTHOPAEDIC SURGERY
Payer: MEDICARE

## 2018-10-11 ENCOUNTER — HOSPITAL ENCOUNTER (OUTPATIENT)
Dept: RADIOLOGY | Facility: HOSPITAL | Age: 78
Discharge: HOME | End: 2018-10-11
Attending: ORTHOPAEDIC SURGERY
Payer: MEDICARE

## 2018-10-11 VITALS — BODY MASS INDEX: 25.88 KG/M2 | WEIGHT: 161 LBS | HEIGHT: 66 IN

## 2018-10-11 DIAGNOSIS — M79.646 PAIN OF FINGER, UNSPECIFIED LATERALITY: Primary | ICD-10-CM

## 2018-10-11 DIAGNOSIS — M79.646 PAIN OF FINGER, UNSPECIFIED LATERALITY: ICD-10-CM

## 2018-10-11 PROCEDURE — 99213 OFFICE O/P EST LOW 20 MIN: CPT | Mod: S$PBB,,, | Performed by: ORTHOPAEDIC SURGERY

## 2018-10-11 PROCEDURE — 99213 OFFICE O/P EST LOW 20 MIN: CPT | Mod: PBBFAC,25,PN | Performed by: ORTHOPAEDIC SURGERY

## 2018-10-11 PROCEDURE — 99999 PR PBB SHADOW E&M-EST. PATIENT-LVL III: CPT | Mod: PBBFAC,,, | Performed by: ORTHOPAEDIC SURGERY

## 2018-10-11 PROCEDURE — 73140 X-RAY EXAM OF FINGER(S): CPT | Mod: 26,LT,, | Performed by: RADIOLOGY

## 2018-10-11 PROCEDURE — 1101F PT FALLS ASSESS-DOCD LE1/YR: CPT | Mod: CPTII,,, | Performed by: ORTHOPAEDIC SURGERY

## 2018-10-11 PROCEDURE — 73140 X-RAY EXAM OF FINGER(S): CPT | Mod: TC,FY

## 2018-10-11 RX ORDER — DICLOFENAC SODIUM 10 MG/G
2 GEL TOPICAL 3 TIMES DAILY
Qty: 1 TUBE | Refills: 3 | Status: SHIPPED | OUTPATIENT
Start: 2018-10-11 | End: 2018-11-22

## 2018-10-11 NOTE — PROGRESS NOTES
HISTORY OF PRESENT ILLNESS:  Mr. Mckeon about four weeks out open fracture of   the left ring finger.  He is doing well.  Pain is minimal.  He is currently in   therapy and making good progress.    PHYSICAL EXAMINATION:  LEFT HAND:  The ring finger looks good.  Skin is well healed.  The scar is a   little bit thickened, but not too bad.  No evidence of infection.  Range of   motion improved.    X-RAYS:  AP and lateral, left ring finger, demonstrate healing proximal phalanx   fracture, good position ring finger.    PLAN:  Continue therapy, increase activities as tolerated.  I have ordered some   Voltaren gel for topical use because he does have a little bit of swelling   there.  Follow up in 3-4 weeks.      RICKIE  dd: 10/11/2018 16:39:16 (CDT)  td: 10/12/2018 09:11:10 (CDT)  Doc ID   #[id number]  Job ID #[id number]    CC:

## 2018-10-12 ENCOUNTER — CLINICAL SUPPORT (OUTPATIENT)
Dept: REHABILITATION | Facility: HOSPITAL | Age: 78
End: 2018-10-12
Attending: ORTHOPAEDIC SURGERY
Payer: MEDICARE

## 2018-10-12 DIAGNOSIS — Z78.9 IMPAIRED INSTRUMENTAL ACTIVITIES OF DAILY LIVING: ICD-10-CM

## 2018-10-12 DIAGNOSIS — R29.898 WEAKNESS OF LEFT HAND: ICD-10-CM

## 2018-10-12 DIAGNOSIS — M79.645 PAIN OF FINGER OF LEFT HAND: ICD-10-CM

## 2018-10-12 DIAGNOSIS — M25.60 DECREASED RANGE OF MOTION: ICD-10-CM

## 2018-10-12 PROCEDURE — 97110 THERAPEUTIC EXERCISES: CPT | Mod: PN

## 2018-10-12 NOTE — PROGRESS NOTES
"  Occupational Therapy Daily Treatment Note     Date: 10/12/2018  Name: Bao Mckeon  Clinic Number: [id number]    Therapy Diagnosis: No diagnosis found.  Physician: Marcellus Canada Jr., *    Physician Orders: Eval and treat  Medical Diagnosis: L RF prox phalanx  Surgical Procedure and Date: NA  Evaluation Date: 10/5/18  Plan of Care Certification Period: 12/5/18  Date of Return to MD: 10/11/18    Visit # / Visits authorized:3 of 20 Exp 12/31/18  Time In:1100 pm   Time Out: 1150 pm   Total Billable Time: 50 minutes    Precautions:  Standard      Subjective     Pt reports: I saw Dr Canada yesterday and he said things look good.   X RAY report 10/11/18 FINDINGS:  Re-identified oblique nondisplaced fracture of the 3rd proximal phalanx with healing changes.  Mild degenerative changes noted at the IP joints.  Soft tissue swelling noted.  Response to previous treatment:Positive.   Functional change: Appears to have an increase in ROM and swelling is improving.    Pain: 2/10  Location: left finger RF    Objective     Bao received the following manual therapy techniques for 10 minutes:   -Retrograde and scar massage. To RF, also Scar  is lessl sensitive. Coban  Wrapping for edema reduction    Bao received therapeutic exercises for 40 minutes including:  -Low load PROM to MP IP an DIP L RF hold at end ROM for IP an DIP for 20-30"   Blocking ex for IP and DIP flexion 2x 20 reps  Digit Abd and adduction 2x20  TGE's hook flat and composite fist. 2x20  Table top digit extension 2x20  Ergo Gripper 1 yellow and 1 red band 20 x 3; hold 30" 4x  Chinese meditation balls 2 min x2  9 hole peg test, remove and replace in hand manipulation 4 x   UBE 3 min    AROM L RF MP 0/92 PIP -5/110 DIP 0/55      Home Exercises and Education Provided     Education provided:   - Cont with HEP, pt also looking into purchasing silver meditation balls (was unable to find might order them).  - Progress towards goals     Written Home Exercises " Provided: Patient instructed to cont prior HEP.  Exercises were reviewed and Bao was able to demonstrate them prior to the end of the session.  Bao demonstrated good  understanding of the HEP provided.  (no questions regarding HEP).  .   See EMR under Patient Instructions for exercises provided prior visit.        Assessment     Pt would continue to benefit from skilled OT. He has made progress with ROM and strength. He is also improving with functional use and c/o pain remains low. Edema is still present but decreasing. He has a goal of returing to playing the piano and has excellent potential to reach that goal.     Bao is progressing well towards his goals and there are no updates to goals at this time. Pt prognosis is Excellent.     Pt will continue to benefit from skilled outpatient occupational therapy to address the deficits listed in the problem list on initial evaluation provide pt/family education and to maximize pt's level of independence in the home and community environment.     Anticipated barriers to occupational therapy: NA    Pt's spiritual, cultural and educational needs considered and pt agreeable to plan of care and goals.    Goals:  Full functioonal use of hand.    Plan   Cont with OT  decreasing to 1x a week for he POC period ending 12/5/18.    CHANI Temple

## 2018-10-15 ENCOUNTER — CLINICAL SUPPORT (OUTPATIENT)
Dept: REHABILITATION | Facility: HOSPITAL | Age: 78
End: 2018-10-15
Attending: ORTHOPAEDIC SURGERY
Payer: MEDICARE

## 2018-10-15 DIAGNOSIS — R29.898 WEAKNESS OF LEFT HAND: ICD-10-CM

## 2018-10-15 DIAGNOSIS — Z78.9 IMPAIRED INSTRUMENTAL ACTIVITIES OF DAILY LIVING: ICD-10-CM

## 2018-10-15 DIAGNOSIS — M25.60 DECREASED RANGE OF MOTION: ICD-10-CM

## 2018-10-15 PROCEDURE — 97110 THERAPEUTIC EXERCISES: CPT | Mod: PN

## 2018-10-15 PROCEDURE — 97022 WHIRLPOOL THERAPY: CPT | Mod: PN

## 2018-10-15 NOTE — PROGRESS NOTES
"  Occupational Therapy Daily Treatment Note     Date: 10/15/2018  Name: Bao Mckeon  Clinic Number: [id number]    Therapy Diagnosis: No diagnosis found.  Physician: Marcellus Canada Jr., *    Physician Orders: Eval and treat  Medical Diagnosis: L RF prox phalanx  Surgical Procedure and Date: NA  Evaluation Date: 10/5/18  Plan of Care Certification Period: 12/5/18  Date of Return to MD: 10/11/18    Visit # / Visits authorized:4  of 20 Exp 12/31/18  Time In:11 pm   Time Out: 200  pm   Total Billable Time: 45 minutes    Precautions:  Standard      Subjective     Pt reports: I have been practicing on the Piano AquaBling e and it feels ok.   X RAY report 10/11/18 FINDINGS:  Re-identified oblique nondisplaced fracture of the 3rd proximal phalanx with healing changes.  Mild degenerative changes noted at the IP joints.  Soft tissue swelling noted.  Response to previous treatment:Positive.   Functional change: Appears to have an increase in ROM and swelling is improving.    Pain: 2/10  Location: left finger RF    Objective   Fluidtherapy 8 min for increased circulation and tissue extensibility AROM ex  Bao received therapeutic exercises for 36 minutes including:  -Low load PROM to MP IP an DIP L RF hold at end ROM for IP an DIP for 20-30"   Blocking ex for IP and DIP flexion 2x 20 reps  Digit Abd and adduction 2x20  TGE's hook flat and composite fist. 2x20  Table top digit extension 2x20  Ergo Gripper 1 yellow and 1 red band 20 x 3; hold 30" 4x  Chinese meditation balls 2 min x2  9 hole peg test, remove and replace in hand manipulation 4 x   UBE 5 min          Home Exercises and Education Provided     Education provided:   - Cont with HEP, pt also looking into purchasing silver meditation balls  - Progress towards goals     Written Home Exercises Provided: Patient instructed to cont prior HEP.  Exercises were reviewed and Bao was able to demonstrate them prior to the end of the session.  Bao demonstrated good  " understanding of the HEP provided.  (no questions regarding HEP).  .   See EMR under Patient Instructions for exercises provided prior visit.        Assessment     Pt would continue to benefit from skilled OT. He continues to progress well and c/o pain remains low. Pt's arom continues to improve.  Bao is progressing well towards his goals and there are no updates to goals at this time. Pt prognosis is Excellent.   Pt not comfortable with 1x a week at this point will cont 2x a week for a few more weeks.    Pt will continue to benefit from skilled outpatient occupational therapy to address the deficits listed in the problem list on initial evaluation provide pt/family education and to maximize pt's level of independence in the home and community environment.     Anticipated barriers to occupational therapy: NA    Pt's spiritual, cultural and educational needs considered and pt agreeable to plan of care and goals.    Goals:  Full functioonal use of hand.    Plan   Cont with OT 2 x a week for he POC period ending 12/5/18.     CHANI Temple

## 2018-10-19 ENCOUNTER — CLINICAL SUPPORT (OUTPATIENT)
Dept: REHABILITATION | Facility: HOSPITAL | Age: 78
End: 2018-10-19
Attending: ORTHOPAEDIC SURGERY
Payer: MEDICARE

## 2018-10-19 DIAGNOSIS — M25.60 DECREASED RANGE OF MOTION: ICD-10-CM

## 2018-10-19 DIAGNOSIS — Z78.9 IMPAIRED INSTRUMENTAL ACTIVITIES OF DAILY LIVING: ICD-10-CM

## 2018-10-19 DIAGNOSIS — R29.898 WEAKNESS OF LEFT HAND: ICD-10-CM

## 2018-10-19 PROCEDURE — 97110 THERAPEUTIC EXERCISES: CPT | Mod: PN

## 2018-10-19 PROCEDURE — 97022 WHIRLPOOL THERAPY: CPT | Mod: PN

## 2018-10-19 PROCEDURE — G8984 CARRY CURRENT STATUS: HCPCS | Mod: CK,PN

## 2018-10-19 PROCEDURE — G8985 CARRY GOAL STATUS: HCPCS | Mod: CJ,PN

## 2018-10-19 NOTE — PROGRESS NOTES
"  Occupational Therapy Daily Treatment Note     Date: 10/19/2018  Name: Bao Mckeon  Clinic Number: [id number]    Therapy Diagnosis: No diagnosis found.  Physician: Marcellus Canada Jr., *    Physician Orders: Eval and treat  Medical Diagnosis: L RF prox phalanx  Surgical Procedure and Date: NA  Evaluation Date: 10/5/18  Plan of Care Certification Period: 12/5/18  Date of Return to MD: 10/11/18    Visit # / Visits authorized:5  of 20 Exp 12/31/18  Time In:1115 am   Time Out: 1200  pm   Total Billable Time: 40 minutes    Precautions:  Standard      Subjective     Pt reports: I am still playing my piano and things are improving.   Response to previous treatment:Positive.   Functional change: Appears to have an increase in ROM and swelling is improving.    Pain: 2/10  Location: left finger RF    Objective   Fluidtherapy 8 min for increased circulation and tissue extensibility AROM ex  Bao received therapeutic exercises for 32 minutes including:  -Low load PROM to MP IP an DIP L RF hold at end ROM for IP an DIP for 20-30"   Blocking ex for IP and DIP flexion 2x 20 reps  Digit Abd and adduction 2x20  TGE's hook flat and composite fist. 2x20  Table top digit extension 2x20  Ergo Gripper 1 Green band 20 x 3; hold 30" 4x  Chinese meditation balls 2 min x2  9 hole peg test, remove and replace in hand manipulation 4 x   Able to comfortable hold a 10# dumbell.    Pt can easily touch palm now with RF.  Home Exercises and Education Provided     Education provided:   - Cont with HEP,   - Progress towards goals     Written Home Exercises Provided: Patient instructed to cont prior HEP.  Exercises were reviewed and Bao was able to demonstrate them prior to the end of the session.  Bao demonstrated good  understanding of the HEP provided.  (no questions regarding HEP).  .   See EMR under Patient Instructions for exercises provided prior visit.    CMS Impairment/Limitation/Restriction for FOTO Hand Survey / " Carry  Status Limitation G-Code CMS Severity Modifier  Intake 61% 39%  Predicted 75% 25% Goal Status+ CJ - At least 20 percent but less than 40 percent  10/19/2018 59% 41% Current Status CK - At least 40 percent but less than 60 percent    Assessment     Pt would continue to benefit from skilled OT. He continues to progress well and c/o pain remains low. Pt's AROM is improving. Pt now consistently able to touch paol with RF  Bao is progressing well towards his goals and there are no updates to goals at this time. Pt prognosis is Excellent.   Pt not comfortable with 1x a week at this point will cont 2x a week for a few more weeks.    Pt will continue to benefit from skilled outpatient occupational therapy to address the deficits listed in the problem list on initial evaluation provide pt/family education and to maximize pt's level of independence in the home and community environment.     Anticipated barriers to occupational therapy: NA    Pt's spiritual, cultural and educational needs considered and pt agreeable to plan of care and goals.    Goals:  Full functioonal use of hand.    Plan   Cont with OT 2 x a week for he POC period ending 12/5/18.     CHANI Temple

## 2018-10-24 ENCOUNTER — CLINICAL SUPPORT (OUTPATIENT)
Dept: REHABILITATION | Facility: HOSPITAL | Age: 78
End: 2018-10-24
Attending: ORTHOPAEDIC SURGERY
Payer: MEDICARE

## 2018-10-24 DIAGNOSIS — R29.898 WEAKNESS OF LEFT HAND: ICD-10-CM

## 2018-10-24 DIAGNOSIS — M25.60 DECREASED RANGE OF MOTION: ICD-10-CM

## 2018-10-24 DIAGNOSIS — Z78.9 IMPAIRED INSTRUMENTAL ACTIVITIES OF DAILY LIVING: ICD-10-CM

## 2018-10-24 PROCEDURE — 97110 THERAPEUTIC EXERCISES: CPT | Mod: PN

## 2018-10-24 PROCEDURE — 97022 WHIRLPOOL THERAPY: CPT | Mod: PN

## 2018-10-24 NOTE — PROGRESS NOTES
"  Occupational Therapy Daily Treatment Note     Date: 10/24/2018  Name: Bao Mckeon  Clinic Number: [id number]    Therapy Diagnosis:pain weakness and decreased ROM  Physician: Marcellus Canada Jr., *    Physician Orders: Eval and treat  Medical Diagnosis: L RF prox phalanx  Surgical Procedure and Date: NA  Evaluation Date: 10/5/18  Plan of Care Certification Period: 12/5/18  Date of Return to MD: 10/11/18    Visit # / Visits authorized: 6 of 20 Exp 12/31/18  Time In:1100 am   Time Out: 1200  pm   Total Billable Time: 48 minutes    Precautions:  Standard      Subjective     Pt reports: If I hit it I have pain, but generally I have no pain. I have been practicing more on Piano.   Response to previous treatment:Positive.   Functional change: Appears to have an increase in ROM and swelling is improving.    Pain: 1/10  Location: left finger RF    Objective   Fluidtherapy 8 min for increased circulation and tissue extensibility AROM ex  Bao received therapeutic exercises for 40 minutes including:  -Low load PROM to MP IP an DIP L RF hold at end ROM for IP an DIP for 20-30"   Blocking ex for IP and DIP flexion 2x 20 reps  Digit Abd and adduction 2x20  TGE's hook flat and composite fist. 2x20  Table top digit extension 2x20  Ergo Gripper 1 Green band 20 x 3; hold 30" 4x  Chinese meditation balls 2 min x2  9 hole peg test, remove and replace in hand manipulation 4 x   Able to comfortable hold a 10# dumbell.  UBE 3 min forward and 3 min reverse.    Pt can easily touch palm now with RF.  Home Exercises and Education Provided     Education provided:   - Cont with HEP,   - Progress towards goals     Written Home Exercises Provided: Patient instructed to cont prior HEP.  Exercises were reviewed and Bao was able to demonstrate them prior to the end of the session.  Bao demonstrated good  understanding of the HEP provided.  (no questions regarding HEP).  .   See EMR under Patient Instructions for exercises provided " prior visit.    CMS Impairment/Limitation/Restriction for FOTO Hand Survey / Carry  Status Limitation G-Code CMS Severity Modifier  Intake 61% 39%  Predicted 75% 25% Goal Status+ CJ - At least 20 percent but less than 40 percent  10/19/2018 59% 41% Current Status CK - At least 40 percent but less than 60 percent    Assessment     He continues to progress well and c/o pain remains low. Pt's AROM is improving. He is playing elmore more and is feeling better about his situation.  Bao is progressing well towards his goals and there are no updates to goals at this time. Pt prognosis is Excellent.   Pt not comfortable with 1x a week at this point will cont 2x a week for a few more weeks.    Pt will continue to benefit from skilled outpatient occupational therapy to address the deficits listed in the problem list on initial evaluation provide pt/family education and to maximize pt's level of independence in the home and community environment.     Anticipated barriers to occupational therapy: NA    Pt's spiritual, cultural and educational needs considered and pt agreeable to plan of care and goals.    Goals:  Full functioonal use of hand.    Plan   Cont with OT. Discussed with patient he would like to skip a week to focus on HEP. I agree with this.  POC period ending 12/5/18.     CHANI Temple

## 2018-11-05 ENCOUNTER — CLINICAL SUPPORT (OUTPATIENT)
Dept: REHABILITATION | Facility: HOSPITAL | Age: 78
End: 2018-11-05
Attending: ORTHOPAEDIC SURGERY
Payer: MEDICARE

## 2018-11-05 DIAGNOSIS — R29.898 WEAKNESS OF LEFT HAND: ICD-10-CM

## 2018-11-05 DIAGNOSIS — M25.60 DECREASED RANGE OF MOTION: ICD-10-CM

## 2018-11-05 DIAGNOSIS — Z78.9 IMPAIRED INSTRUMENTAL ACTIVITIES OF DAILY LIVING: ICD-10-CM

## 2018-11-05 PROCEDURE — G8985 CARRY GOAL STATUS: HCPCS | Mod: CJ,PN

## 2018-11-05 PROCEDURE — 97110 THERAPEUTIC EXERCISES: CPT | Mod: PN

## 2018-11-05 PROCEDURE — G8984 CARRY CURRENT STATUS: HCPCS | Mod: CJ,PN

## 2018-11-05 PROCEDURE — 97022 WHIRLPOOL THERAPY: CPT | Mod: PN

## 2018-11-05 NOTE — PROGRESS NOTES
"  Occupational Therapy Daily Treatment Note     Date: 11/5/2018  Name: Bao Mckeon  Clinic Number: [id number]    Therapy Diagnosis:pain weakness and decreased ROM  Physician: Marcellus Canada Jr., *    Physician Orders: Eval and treat  Medical Diagnosis: L RF prox phalanx  Surgical Procedure and Date: NA  Evaluation Date: 10/5/18  Plan of Care Certification Period: 12/5/18  Date of Return to MD: 10/11/18    Visit # / Visits authorized: 7 of 20 Exp 12/31/18  Time In:1115 am   Time Out: 1200  pm   Total Billable Time: 45 minutes    Precautions:  Standard      Subjective     Pt reports: I feel like I have made a lot of progress. I don't feel like I need to come anymore.   Response to previous treatment:Positive.   Functional change: Appears to have an increase in ROM and swelling is improving.    Pain: 1/10  Location: left finger RF    Objective   Fluidtherapy 8 min for increased circulation and tissue extensibility AROM ex  Bao received therapeutic exercises for 35 minutes including:  -Low load PROM to MP IP an DIP L RF hold at end ROM for IP an DIP for 20-30"   Blocking ex for IP and DIP flexion 2x 20 reps  Digit Abd and adduction 2x20  TGE's hook flat and composite fist. 2x20  Table top digit extension 2x20  Ergo Gripper 1 Green and 1 Red band 20 x 3; hold 30" 5x  Chinese meditation balls 2 min x2  9 hole peg test, remove and replace in hand manipulation 4 x   Able to comfortable hold a 10# dumbell.     L 35  R 55    Mp 90  DIP 50    Home Exercises and Education Provided     Education provided:   - Cont with HEP,   - Progress towards goals     Written Home Exercises Provided: Patient instructed to cont prior HEP. And added Green theraputty ex. Provided with putty and written ex.  Exercises were reviewed and Bao was able to demonstrate them prior to the end of the session.  Bao demonstrated good  understanding of the HEP provided.  (no questions regarding HEP).  .   See EMR under Patient " Instructions for exercises provided prior visit.    CMS Impairment/Limitation/Restriction for FOTO Hand Survey carry  Status Limitation G-Code CMS Severity Modifier  Intake 61% 39%  Predicted 75% 25% Goal Status+ CJ - At least 20 percent but less than 40 percent  10/19/2018 59% 41%  2018 71% 29% Current Status CJ - At least 20 percent but less than 40 percent  D/C Status CJ **only report if this is discharge survey  Assessment     He continues to progress well and c/o pain remains low. He is playing elmore more and is feeling better about his situation. He still could increase his strength but feels as though he could do this at home. Provided with theraputty  He feels like he can continue with his ex at home. I suggested that he call MD if he doesn not want to continue with his last appt. Pt has progressed well with OT.  Will keep chart open and trial HEP for 2 weeks with patient.  Bao is progressing well towards his goals and there are no updates to goals at this time. Pt prognosis is Excellent.   Anticipated barriers to occupational therapy: NA    Pt's spiritual, cultural and educational needs considered and pt agreeable to plan of care and goals.    Goals:  Short Term Goals 4 weeks  1.Increase TUBBS 25 degrees for increased I with daily tasks MET  2. Increase  to 45# for increase I with ADL's Not Met  3. Edema decreased to 6.5cm MET  LT.  within 5# LvsR Not Met  2. AROM WNL to perform all daily tasks. Met  3. Pt resumes playing piano Met  4. Edema minimal and does not interfere with ROM Met  5. I with HEP Met  6. I with all daily tasks Met        Plan   Pt would like to try to continue on his own and plan. No more visits scheduled at this time. Pt will call in the next 2-3 weeks if he would like to reschedule. I agree pt has progressed well.  POC period ending 18.     CHANI Temple

## 2018-11-05 NOTE — PATIENT INSTRUCTIONS
Extension (Assistive Putty)        Roll putty back and forth, being sure to use all fingertips.  Repeat ____ times. Do ____ sessions per day.    Copyright © I. All rights reserved.    Strengthening (Resistive Putty)        Squeeze putty using thumb and all fingers.  Repeat ____ times. Do ____ sessions per day.    Copyright © I. All rights reserved.   MP Flexion (Resistive Putty)        Bending only at large knuckles, press putty down against thumb. Keep fingertips straight.  Repeat ____ times. Do ____ sessions per day.    Copyright © I. All rights reserved.   Adduction (Resistive Putty)        Press between 2 fingers and pull putty anchored with other hand. Repeat with all fingers.  Repeat ____ times. Do ____ sessions per day.    Copyright © I. All rights reserved.   IP Fisting (Resistive Putty)        Keeping knuckles straight, bend fingertips to squeeze putty.  Repeat ____ times. Do ____ sessions per day.    Copyright © I. All rights reserved.   IP Extension (Resistive Putty)        Place putty loop around tips of fingers and thumb. Stretch loop by extending middle and tip joints. Keep thumb and large knuckles still.  Repeat ____ times. Do ____ sessions per day.    Copyright © I. All rights reserved.

## 2018-11-18 ENCOUNTER — OFFICE VISIT (OUTPATIENT)
Dept: URGENT CARE | Facility: CLINIC | Age: 78
End: 2018-11-18
Payer: MEDICARE

## 2018-11-18 VITALS
BODY MASS INDEX: 25.88 KG/M2 | SYSTOLIC BLOOD PRESSURE: 152 MMHG | OXYGEN SATURATION: 98 % | WEIGHT: 161 LBS | RESPIRATION RATE: 18 BRPM | DIASTOLIC BLOOD PRESSURE: 65 MMHG | HEART RATE: 60 BPM | HEIGHT: 66 IN | TEMPERATURE: 98 F

## 2018-11-18 DIAGNOSIS — M79.644 FINGER PAIN, RIGHT: Primary | ICD-10-CM

## 2018-11-18 DIAGNOSIS — S62.664A CLOSED NONDISPLACED FRACTURE OF DISTAL PHALANX OF RIGHT RING FINGER, INITIAL ENCOUNTER: ICD-10-CM

## 2018-11-18 PROCEDURE — 99214 OFFICE O/P EST MOD 30 MIN: CPT | Mod: S$GLB,,, | Performed by: NURSE PRACTITIONER

## 2018-11-18 PROCEDURE — 73140 X-RAY EXAM OF FINGER(S): CPT | Mod: RT,S$GLB,, | Performed by: RADIOLOGY

## 2018-11-18 PROCEDURE — 1101F PT FALLS ASSESS-DOCD LE1/YR: CPT | Mod: CPTII,S$GLB,, | Performed by: NURSE PRACTITIONER

## 2018-11-18 NOTE — PATIENT INSTRUCTIONS
Follow up with your doctor in a few days.  Return to the urgent care or go to the ER if symptoms get worse.    The final reading of your xray showed a possible small non-displaced fracture of the distal end of the finger.    Keep in immobilizer for 3-4 weeks to allow for proper healing. Follow up with orthopedic or primary care.    Continue to apply cool compress for 1 day.  See handout on finger fracture.        Finger Fracture, Closed  You have a broken finger (fracture). This causes local pain, swelling, and bruising. This injury usually takes about 4 to 6 weeks to heal, but can take longer in some cases. Finger injuries are often treated with a splint or cast, or by taping the injured finger to the next one (edward taping). This protects the injured finger and holds the bone in position while it heals. More serious fractures may need surgery.     If the fingernail has been severely injured, it will probably fall off in 1 to 2 weeks. A new fingernail will usually start to grow back within a month.  Home care  Follow these guidelines when caring for yourself at home:  · Keep your hand elevated to reduce pain and swelling. When sitting or lying down keep your arm above the level of your heart. You can do this by placing your arm on a pillow that rests on your chest or on a pillow at your side. This is most important during the first 2 days (48 hours) after the injury.  · Put an ice pack on the injured area. Do this for 20 minutes every 1 to 2 hours the first day for pain relief. You can make an ice pack by wrapping a plastic bag of ice cubes in a thin towel. As the ice melts, be careful that the cast or splint doesnt get wet. Continue using the ice pack 3 to 4 times a day until the pain and swelling go away.  · Keep the cast or splint completely dry at all times. Bathe with your cast or splint out of the water. Protect it with a large plastic bag, rubber-banded at the top end. If a fiberglass cast or splint gets  wet, you can dry it with a hair dryer.  · If edward tape was put on and it becomes wet or dirty, change it. You may replace it with paper, plastic, or cloth tape. Cloth tape and paper tapes must be kept dry. Keep the edward tape in place for at least 4 weeks.  · You may use acetaminophen or ibuprofen to control pain, unless another pain medicine was prescribed. If you have chronic liver or kidney disease, talk with your healthcare provider before using these medicines. Also talk with your provider if youve had a stomach ulcer or gastrointestinal bleeding.  · Dont put creams or objects under the cast if you have itching.  Follow-up care  Follow up with your healthcare provider, or as advised. This is to make sure the bone is healing the way it should.  X-rays may be taken. You will be told of any new findings that may affect your care.  When to seek medical advice  Call your healthcare provider right away if any of these occur:  · The plaster cast or splint becomes wet or soft  · The cast or splint cracks  · The fiberglass cast or splint stays wet for more than 24 hours  · Pain or swelling gets worse  · Redness, warmth, swelling, drainage from the wound, or foul odor from a cast or splint  · Finger becomes more cold, blue, numb, or tingly  · You cant move your finger  · The skin around the cast or splint becomes red  · Fever of 100.4ºF (38ºC) or higher, or as directed by your healthcare provider  Date Last Reviewed: 2/1/2017  © 0991-6190 [employer], [employer]. [address] [place] [address], [place] [address]. All rights reserved. This information is not intended as a substitute for professional medical care. Always follow your healthcare professional's instructions.

## 2018-11-18 NOTE — PROGRESS NOTES
"Subjective:       Patient ID: Bao Mckeon is a 78 y.o. male.    Vitals:  height is 5' 6" (1.676 m) and weight is 73 kg (161 lb). His temperature is 98 °F (36.7 °C). His blood pressure is 152/65 (abnormal) and his pulse is 60. His respiration is 18 and oxygen saturation is 98%.     Chief Complaint: Hand Pain    Pt c/o right ring finger pain. 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 Reports he his able to bend, feel sensation, reports tenderness to touch and bruising.       Hand Pain    The incident occurred 2 days ago. The incident occurred at home. Injury mechanism: crushing  The pain is present in the right fingers. The quality of the pain is described as aching and burning. The pain does not radiate. The pain is at a severity of 2/10. The pain is mild. The pain has been constant since the incident. Pertinent negatives include no chest pain. The symptoms are aggravated by palpation. He has tried nothing for the symptoms. The treatment provided no relief.       Constitution: Negative for chills, fatigue and fever.   HENT: Negative for congestion and sore throat.    Neck: Negative for painful lymph nodes.   Cardiovascular: Negative for chest pain and leg swelling.   Eyes: Negative for double vision and blurred vision.   Respiratory: Negative for cough and shortness of breath.    Gastrointestinal: Negative for nausea, vomiting and diarrhea.   Genitourinary: Negative for dysuria, frequency and urgency.   Musculoskeletal: Negative for joint pain, joint swelling, muscle cramps and muscle ache.   Skin: Negative for color change, pale and rash.   Allergic/Immunologic: Negative for seasonal allergies.   Neurological: Negative for dizziness, history of vertigo, light-headedness, passing out and headaches.   Hematologic/Lymphatic: Negative for swollen lymph nodes, easy bruising/bleeding and history of blood clots. Does not bruise/bleed easily.   Psychiatric/Behavioral: Negative for nervous/anxious, sleep disturbance and " depression. The patient is not nervous/anxious.        Objective:      Physical Exam   Constitutional: He is oriented to person, place, and time. He appears well-developed and well-nourished. He is cooperative.  Non-toxic appearance. He does not appear ill. No distress.   HENT:   Head: Normocephalic and atraumatic. Head is without abrasion, without contusion and without laceration.   Right Ear: Hearing, tympanic membrane, external ear and ear canal normal. No hemotympanum.   Left Ear: Hearing, tympanic membrane, external ear and ear canal normal. No hemotympanum.   Nose: Nose normal. No mucosal edema, rhinorrhea or nasal deformity. No epistaxis. Right sinus exhibits no maxillary sinus tenderness and no frontal sinus tenderness. Left sinus exhibits no maxillary sinus tenderness and no frontal sinus tenderness.   Mouth/Throat: Uvula is midline, oropharynx is clear and moist and mucous membranes are normal. No trismus in the jaw. Normal dentition. No uvula swelling. No posterior oropharyngeal erythema.   Eyes: Conjunctivae, EOM and lids are normal. Pupils are equal, round, and reactive to light. Right eye exhibits no discharge. Left eye exhibits no discharge. No scleral icterus.   Sclera clear bilat   Neck: Trachea normal, normal range of motion, full passive range of motion without pain and phonation normal. Neck supple. No spinous process tenderness and no muscular tenderness present. No neck rigidity. No tracheal deviation present.   Cardiovascular: Normal rate, regular rhythm, normal heart sounds, intact distal pulses and normal pulses.   Pulmonary/Chest: Effort normal and breath sounds normal. No respiratory distress.   Abdominal: Soft. Normal appearance and bowel sounds are normal. He exhibits no distension, no pulsatile midline mass and no mass. There is no tenderness.   Musculoskeletal: He exhibits no edema or deformity.        Right hand: He exhibits decreased range of motion, tenderness and bony tenderness.  He exhibits normal capillary refill, no laceration and no swelling. Normal sensation noted.        Hands:  Right hand, ring finger with ecchymosis and tenderness on dorsal aspect, distal end, distal to DIP joint. No tenderness of DIP or PIP. Full rom noted.   Neurological: He is alert and oriented to person, place, and time. He has normal strength. No cranial nerve deficit or sensory deficit. He exhibits normal muscle tone. He displays no seizure activity. Coordination normal. GCS eye subscore is 4. GCS verbal subscore is 5. GCS motor subscore is 6.   Skin: Skin is warm, dry and intact. Capillary refill takes less than 2 seconds. No abrasion, no bruising, no burn, no ecchymosis and no laceration noted. He is not diaphoretic. No pallor.   Psychiatric: He has a normal mood and affect. His speech is normal and behavior is normal. Judgment and thought content normal. Cognition and memory are normal.   Nursing note and vitals reviewed.    X-ray Finger 2 Or More Views Right    Result Date: 11/18/2018  EXAMINATION: XR FINGER 2 OR MORE VIEWS RIGHT CLINICAL HISTORY: Pain in right finger(s) TECHNIQUE: Three views of the right 4th digit. COMPARISON: None FINDINGS: The bones are osteopenic.  On the lateral view, there does appear to be a small disruption of the cortex of the distal phalanx along its volar base suggesting a small nondisplaced impacted fracture..     As above. Electronically signed by: Melissa Bañuelos MD Date:    11/18/2018 Time:    11:50  Finger splint placed to keep finger immobilized. Good cap refill, warm pre and post application.   Assessment:       1. Finger pain, right    2. Closed nondisplaced fracture of distal phalanx of right ring finger, initial encounter        Plan:     advised to follow up with dr. Canada (hand specialist) as he is known to him due to previous left hand fracture/injury.     Finger pain, right  -     X-Ray Finger 2 or More Views Right; Future; Expected date: 11/18/2018    Closed  nondisplaced fracture of distal phalanx of right ring finger, initial encounter      Patient Instructions   Follow up with your doctor in a few days.  Return to the urgent care or go to the ER if symptoms get worse.    The final reading of your xray showed a possible small non-displaced fracture of the distal end of the finger.    Keep in immobilizer for 3-4 weeks to allow for proper healing. Follow up with orthopedic or primary care.    Continue to apply cool compress for 1 day.  See handout on finger fracture.        Finger Fracture, Closed  You have a broken finger (fracture). This causes local pain, swelling, and bruising. This injury usually takes about 4 to 6 weeks to heal, but can take longer in some cases. Finger injuries are often treated with a splint or cast, or by taping the injured finger to the next one (edward taping). This protects the injured finger and holds the bone in position while it heals. More serious fractures may need surgery.     If the fingernail has been severely injured, it will probably fall off in 1 to 2 weeks. A new fingernail will usually start to grow back within a month.  Home care  Follow these guidelines when caring for yourself at home:  · Keep your hand elevated to reduce pain and swelling. When sitting or lying down keep your arm above the level of your heart. You can do this by placing your arm on a pillow that rests on your chest or on a pillow at your side. This is most important during the first 2 days (48 hours) after the injury.  · Put an ice pack on the injured area. Do this for 20 minutes every 1 to 2 hours the first day for pain relief. You can make an ice pack by wrapping a plastic bag of ice cubes in a thin towel. As the ice melts, be careful that the cast or splint doesnt get wet. Continue using the ice pack 3 to 4 times a day until the pain and swelling go away.  · Keep the cast or splint completely dry at all times. Bathe with your cast or splint out of the  water. Protect it with a large plastic bag, rubber-banded at the top end. If a fiberglass cast or splint gets wet, you can dry it with a hair dryer.  · If edward tape was put on and it becomes wet or dirty, change it. You may replace it with paper, plastic, or cloth tape. Cloth tape and paper tapes must be kept dry. Keep the edward tape in place for at least 4 weeks.  · You may use acetaminophen or ibuprofen to control pain, unless another pain medicine was prescribed. If you have chronic liver or kidney disease, talk with your healthcare provider before using these medicines. Also talk with your provider if youve had a stomach ulcer or gastrointestinal bleeding.  · Dont put creams or objects under the cast if you have itching.  Follow-up care  Follow up with your healthcare provider, or as advised. This is to make sure the bone is healing the way it should.  X-rays may be taken. You will be told of any new findings that may affect your care.  When to seek medical advice  Call your healthcare provider right away if any of these occur:  · The plaster cast or splint becomes wet or soft  · The cast or splint cracks  · The fiberglass cast or splint stays wet for more than 24 hours  · Pain or swelling gets worse  · Redness, warmth, swelling, drainage from the wound, or foul odor from a cast or splint  · Finger becomes more cold, blue, numb, or tingly  · You cant move your finger  · The skin around the cast or splint becomes red  · Fever of 100.4ºF (38ºC) or higher, or as directed by your healthcare provider  Date Last Reviewed: 2/1/2017 © 2000-2017 [employer]. [address] [place] [id number]. All rights reserved. This information is not intended as a substitute for professional medical care. Always follow your healthcare professional's instructions.

## 2018-11-22 ENCOUNTER — OFFICE VISIT (OUTPATIENT)
Dept: URGENT CARE | Facility: CLINIC | Age: 78
End: 2018-11-22
Payer: MEDICARE

## 2018-11-22 VITALS
WEIGHT: 155 LBS | DIASTOLIC BLOOD PRESSURE: 80 MMHG | TEMPERATURE: 98 F | OXYGEN SATURATION: 99 % | HEIGHT: 66 IN | BODY MASS INDEX: 24.91 KG/M2 | SYSTOLIC BLOOD PRESSURE: 183 MMHG | RESPIRATION RATE: 16 BRPM | HEART RATE: 59 BPM

## 2018-11-22 DIAGNOSIS — R53.83 FATIGUE, UNSPECIFIED TYPE: Primary | ICD-10-CM

## 2018-11-22 LAB
CTP QC/QA: YES
FLUAV AG NPH QL: NEGATIVE
FLUBV AG NPH QL: NEGATIVE

## 2018-11-22 PROCEDURE — 87804 INFLUENZA ASSAY W/OPTIC: CPT | Mod: 59,QW,S$GLB, | Performed by: NURSE PRACTITIONER

## 2018-11-22 PROCEDURE — 1101F PT FALLS ASSESS-DOCD LE1/YR: CPT | Mod: CPTII,S$GLB,, | Performed by: NURSE PRACTITIONER

## 2018-11-22 PROCEDURE — 99213 OFFICE O/P EST LOW 20 MIN: CPT | Mod: S$GLB,,, | Performed by: NURSE PRACTITIONER

## 2018-11-22 NOTE — PROGRESS NOTES
"Subjective:       Patient ID: Bao Mckeon is a 78 y.o. male.    Vitals:  height is 5' 6" (1.676 m) and weight is 70.3 kg (155 lb). His temperature is 98 °F (36.7 °C). His blood pressure is 183/80 (abnormal) and his pulse is 59 (abnormal). His respiration is 16 and oxygen saturation is 99%.     Chief Complaint: Fatigue and Nasal Congestion    Pt is c/o fatigue, slight cough, and runny nose. Pt denies otc remedies.         Constitution: Positive for fatigue.   HENT: Positive for congestion and postnasal drip.    Respiratory: Positive for cough.        Objective:      Physical Exam   Constitutional: He is oriented to person, place, and time. He appears well-developed and well-nourished. He is cooperative.  Non-toxic appearance. He does not appear ill. No distress.   HENT:   Head: Normocephalic and atraumatic.   Right Ear: Hearing, tympanic membrane, external ear and ear canal normal.   Left Ear: Hearing, tympanic membrane, external ear and ear canal normal.   Nose: Nose normal. No mucosal edema, rhinorrhea or nasal deformity. No epistaxis. Right sinus exhibits no maxillary sinus tenderness and no frontal sinus tenderness. Left sinus exhibits no maxillary sinus tenderness and no frontal sinus tenderness.   Mouth/Throat: Uvula is midline and mucous membranes are normal. No trismus in the jaw. Normal dentition. No uvula swelling. Posterior oropharyngeal erythema present.   Eyes: Conjunctivae and lids are normal. Right eye exhibits no discharge. Left eye exhibits no discharge. No scleral icterus.   Sclera clear bilat   Neck: Trachea normal, normal range of motion, full passive range of motion without pain and phonation normal. Neck supple.   Cardiovascular: Normal rate, regular rhythm, normal heart sounds, intact distal pulses and normal pulses.   Pulmonary/Chest: Effort normal and breath sounds normal. No respiratory distress.   Abdominal: Soft. Normal appearance and bowel sounds are normal. He exhibits no " distension, no pulsatile midline mass and no mass. There is no tenderness.   Musculoskeletal: Normal range of motion. He exhibits no edema or deformity.   Neurological: He is alert and oriented to person, place, and time. He exhibits normal muscle tone. Coordination normal.   Skin: Skin is warm, dry and intact. He is not diaphoretic. No pallor.   Psychiatric: He has a normal mood and affect. His speech is normal and behavior is normal. Judgment and thought content normal. Cognition and memory are normal.   Nursing note and vitals reviewed.      Assessment:       1. Fatigue, unspecified type        Plan:         Fatigue, unspecified type  -     POCT Influenza A/B    flu negative   There are no Patient Instructions on file for this visit.

## 2019-07-02 ENCOUNTER — OFFICE VISIT (OUTPATIENT)
Dept: OPTOMETRY | Facility: CLINIC | Age: 79
End: 2019-07-02
Payer: COMMERCIAL

## 2019-07-02 DIAGNOSIS — H52.4 PRESBYOPIA: Primary | ICD-10-CM

## 2019-07-02 DIAGNOSIS — Z13.5 GLAUCOMA SCREENING: ICD-10-CM

## 2019-07-02 DIAGNOSIS — H25.13 NUCLEAR SCLEROSIS, BILATERAL: ICD-10-CM

## 2019-07-02 DIAGNOSIS — H40.053 BILATERAL OCULAR HYPERTENSION: ICD-10-CM

## 2019-07-02 PROCEDURE — 92004 PR EYE EXAM, NEW PATIENT,COMPREHESV: ICD-10-PCS | Mod: S$GLB,,, | Performed by: OPTOMETRIST

## 2019-07-02 PROCEDURE — 92015 DETERMINE REFRACTIVE STATE: CPT | Mod: S$GLB,,, | Performed by: OPTOMETRIST

## 2019-07-02 PROCEDURE — 92015 PR REFRACTION: ICD-10-PCS | Mod: S$GLB,,, | Performed by: OPTOMETRIST

## 2019-07-02 PROCEDURE — 92020 PR SPECIAL EYE EVAL,GONIOSCOPY: ICD-10-PCS | Mod: S$GLB,,, | Performed by: OPTOMETRIST

## 2019-07-02 PROCEDURE — 99999 PR PBB SHADOW E&M-EST. PATIENT-LVL II: CPT | Mod: PBBFAC,,, | Performed by: OPTOMETRIST

## 2019-07-02 PROCEDURE — 92004 COMPRE OPH EXAM NEW PT 1/>: CPT | Mod: S$GLB,,, | Performed by: OPTOMETRIST

## 2019-07-02 PROCEDURE — 92020 GONIOSCOPY: CPT | Mod: S$GLB,,, | Performed by: OPTOMETRIST

## 2019-07-02 PROCEDURE — 99999 PR PBB SHADOW E&M-EST. PATIENT-LVL II: ICD-10-PCS | Mod: PBBFAC,,, | Performed by: OPTOMETRIST

## 2019-07-02 NOTE — PROGRESS NOTES
HPI     NABEEL: 2011  Pt states he has a cloud like floater that passes threw his left eye,   states when the cloud is in front it is hard to see, but he is able to see   light threw it. The could comes and goes. States sometimes he feels like   he wants to blink in the left eye like something is in his left. Pt states   he has RX glasses he only uses at night and day driving, states he rarely   every uses his glasses.   No flashes  alaway gtts PRN   Cat sx left eye only 2009--MFIOL    Last edited by Jorge Frye, OD on 7/2/2019  2:21 PM. (History)        ROS     Positive for: Eyes (MFIOL OS)    Negative for: Constitutional, Gastrointestinal, Neurological, Skin,   Genitourinary, Musculoskeletal, HENT, Endocrine, Cardiovascular,   Respiratory, Psychiatric, Allergic/Imm, Heme/Lymph    Last edited by Jorge Frye, OD on 7/2/2019  2:21 PM. (History)        Assessment /Plan     For exam results, see Encounter Report.    Presbyopia    Nuclear sclerosis, bilateral    Bilateral ocular hypertension    Glaucoma screening      1, reduced VA OD 2 to Cat--pt wishes to think about surgery  2. Sp MFIOL/YAG OS  3. Inc IOP OU.  CDs wnl (tho OS small/crowded disc w possible collaterals?).  -fam hx.  Angles open by gonio OU.  Needs VF etc to ro glauc    PLAN:    HVF 24-2 std/OCT.  appt w me after for iop ck/pach/review         6/3/19 addendum--pt came in to the clinic the day after his exam, asking about floater in his left eye.  I discussed causes/resolution.  He will call if sx inc/flashes/etc.  Pt ALSO asked about spex--told him I found no change, and if he is thinking of getting cat surgery OD would be best to wait on spex until after surgery.  He will rtc as sched for VF etc

## 2019-08-07 ENCOUNTER — CLINICAL SUPPORT (OUTPATIENT)
Dept: OPHTHALMOLOGY | Facility: CLINIC | Age: 79
End: 2019-08-07
Payer: MEDICARE

## 2019-08-07 ENCOUNTER — OFFICE VISIT (OUTPATIENT)
Dept: OPTOMETRY | Facility: CLINIC | Age: 79
End: 2019-08-07
Payer: MEDICARE

## 2019-08-07 DIAGNOSIS — H40.053 BILATERAL OCULAR HYPERTENSION: ICD-10-CM

## 2019-08-07 DIAGNOSIS — H40.053 BILATERAL OCULAR HYPERTENSION: Primary | ICD-10-CM

## 2019-08-07 PROCEDURE — 92012 PR EYE EXAM, EST PATIENT,INTERMED: ICD-10-PCS | Mod: S$GLB,,, | Performed by: OPTOMETRIST

## 2019-08-07 PROCEDURE — 92083 EXTENDED VISUAL FIELD XM: CPT | Mod: S$GLB,,, | Performed by: OPTOMETRIST

## 2019-08-07 PROCEDURE — 92133 CPTRZD OPH DX IMG PST SGM ON: CPT | Mod: S$GLB,,, | Performed by: OPTOMETRIST

## 2019-08-07 PROCEDURE — 92083 HUMPHREY VISUAL FIELD - OU - BOTH EYES: ICD-10-PCS | Mod: S$GLB,,, | Performed by: OPTOMETRIST

## 2019-08-07 PROCEDURE — 99999 PR PBB SHADOW E&M-EST. PATIENT-LVL II: CPT | Mod: PBBFAC,,, | Performed by: OPTOMETRIST

## 2019-08-07 PROCEDURE — 92012 INTRM OPH EXAM EST PATIENT: CPT | Mod: S$GLB,,, | Performed by: OPTOMETRIST

## 2019-08-07 PROCEDURE — 92133 POSTERIOR SEGMENT OCT OPTIC NERVE(OCULAR COHERENCE TOMOGRAPHY) - OU - BOTH EYES: ICD-10-PCS | Mod: S$GLB,,, | Performed by: OPTOMETRIST

## 2019-08-07 PROCEDURE — 99999 PR PBB SHADOW E&M-EST. PATIENT-LVL II: ICD-10-PCS | Mod: PBBFAC,,, | Performed by: OPTOMETRIST

## 2019-08-07 NOTE — PROGRESS NOTES
HPI     DLS: 7/2/19  Pt states he is still having floaters and she can see the gel in the left   eye  In his VA. Pt states he is aware of his left eye states it is hard to   explain.   No flashes   alaway gtts     Last edited by Alisa Melara MA on 8/7/2019  2:42 PM. (History)        ROS     Positive for: Eyes (MFIOL OS)    Negative for: Constitutional, Gastrointestinal, Neurological, Skin,   Genitourinary, Musculoskeletal, HENT, Endocrine, Cardiovascular,   Respiratory, Psychiatric, Allergic/Imm, Heme/Lymph    Last edited by Jorge Frye, OD on 8/7/2019  3:09 PM. (History)        Assessment /Plan     For exam results, see Encounter Report.    Bilateral ocular hypertension        1, reduced VA OD 2 to Cat--pt wishes to think about surgery  2. Sp MFIOL/YAG OS  3. OHT--iop stable at 25/26 without meds (see OCT: RNFL normal OD/Borderline OS) --small crowded discs w possible collaterals OS. VF today severely constricted OS>OD  -fam hx.  Pach: [phone].  Angles open by gonio    PLAN:    Given high iops and possible VF loss will get glaucoma consult.  Pt left with appt slip in hand

## 2019-09-24 ENCOUNTER — OFFICE VISIT (OUTPATIENT)
Dept: OPHTHALMOLOGY | Facility: CLINIC | Age: 79
End: 2019-09-24
Payer: MEDICARE

## 2019-09-24 DIAGNOSIS — H40.1134 PRIMARY OPEN-ANGLE GLAUCOMA, BILATERAL, INDETERMINATE STAGE: Primary | ICD-10-CM

## 2019-09-24 DIAGNOSIS — H47.332 CROWDED OPTIC DISC, LEFT: ICD-10-CM

## 2019-09-24 DIAGNOSIS — H40.053 BILATERAL OCULAR HYPERTENSION: ICD-10-CM

## 2019-09-24 DIAGNOSIS — Z96.1 PSEUDOPHAKIA, LEFT EYE: ICD-10-CM

## 2019-09-24 DIAGNOSIS — H25.11 SENILE NUCLEAR SCLEROSIS, RIGHT: ICD-10-CM

## 2019-09-24 PROCEDURE — 92014 PR EYE EXAM, EST PATIENT,COMPREHESV: ICD-10-PCS | Mod: S$GLB,,, | Performed by: OPHTHALMOLOGY

## 2019-09-24 PROCEDURE — 92014 COMPRE OPH EXAM EST PT 1/>: CPT | Mod: S$GLB,,, | Performed by: OPHTHALMOLOGY

## 2019-09-24 PROCEDURE — 92250 FUNDUS PHOTOGRAPHY W/I&R: CPT | Mod: S$GLB,,, | Performed by: OPHTHALMOLOGY

## 2019-09-24 PROCEDURE — 92250 COLOR FUNDUS PHOTOGRAPHY - OU - BOTH EYES: ICD-10-PCS | Mod: S$GLB,,, | Performed by: OPHTHALMOLOGY

## 2019-09-24 PROCEDURE — 99999 PR PBB SHADOW E&M-EST. PATIENT-LVL II: ICD-10-PCS | Mod: PBBFAC,,, | Performed by: OPHTHALMOLOGY

## 2019-09-24 PROCEDURE — 99999 PR PBB SHADOW E&M-EST. PATIENT-LVL II: CPT | Mod: PBBFAC,,, | Performed by: OPHTHALMOLOGY

## 2019-09-24 RX ORDER — LATANOPROST 50 UG/ML
1 SOLUTION/ DROPS OPHTHALMIC EVERY MORNING
Qty: 1 BOTTLE | Refills: 12 | Status: SHIPPED | OUTPATIENT
Start: 2019-09-24 | End: 2020-06-02

## 2019-09-24 NOTE — PROGRESS NOTES
HPI     Glaucoma      Additional comments: glaucoma eval              Comments     Pt here for glaucoma evaluation per .     1. OHT  2. MFIOL OS - Dr Rickey Vega - about 10 urs ago          Last edited by Hemalatha Smart MD on 9/24/2019  4:06 PM. (History)        Assessment /Plan     For exam results, see Encounter Report.    Primary open-angle glaucoma, bilateral, indeterminate stage  -     Galvin Visual Field - OU - Extended - Both Eyes; Future  -     Posterior Segment OCT Optic Nerve- Both eyes; Future  -     Color Fundus Photography - OU - Both Eyes    Bilateral ocular hypertension  -     latanoprost 0.005 % ophthalmic solution; Place 1 drop into both eyes every morning.  Dispense: 1 Bottle; Refill: 12    Crowded optic disc, left    Senile nuclear sclerosis, right    Pseudophakia, left eye           Glaucoma (type and duration)    High IOP    First HVF   8/2019   First photos   9/2019   Treatment / Drops started  9/24/2019            Family history    none        Glaucoma meds    none        H/O adverse rxn to glaucoma drops    none        LASERS    none        GLAUCOMA SURGERIES    none        OTHER EYE SURGERIES    Cataract surgery OS         CDR  0.2 // 0.05 (dilated disc vessels os )         Tbase    25-32/26-34         Tmax    32/34        Ttarget    Below 20         HVF    1test 2019 to  2019 - SAD/IAD (tunnel)  od // SAD/IAD os        Gonio    3+ iris bowed forward OD/ 3+ OS         CCT    597/607        OCT    1 test 2019 to 2019 - RNFL - wnl od // wnl bord IT  os        HRT    ??        Disc photos    9/2019    - Ttoday    32/34  - Test done today    IOP/gonio/DFE     2. PC IOL - OS    Rickey Vega - multifolcal     3, Disc at risk     79 year old male with hx of ocular hypertension here for glaucoma evaluation.     PLAN   Begin latanoprost ou q am   Disc photos today   F/U with HVF / OCT / IOP check on gtts - 3 months     Pt is a vet - may in time [facility]     Pt knows my friend Dr Elba Correa (in a exercise class with her )     Hemalatha Smart

## 2019-09-24 NOTE — LETTER
September 24, 2019      Jorge Frye, OD  [address]  [place] [address]           [facility]  [address] [facility].  [place] [address]  Phone: [phone]  Fax: [phone]          Patient: Bao Mckeon   MR Number: [id number]   YOB: 1940   Date of Visit: 9/24/2019       Dear Dr. Jorge Frye:    Thank you for referring Bao Mckeon to me for evaluation. Attached you will find relevant portions of my assessment and plan of care.    If you have questions, please do not hesitate to call me. I look forward to following Bao Mckeon along with you.    Sincerely,    Hemalatha Smart MD    Enclosure  CC:  No Recipients    If you would like to receive this communication electronically, please contact [email or web address] or [phone] to request more information on [employer] access.    For providers and/or their staff who would like to refer a patient to [facility], please contact us through our one-stop-shop provider referral line, [facility] , at [phone].    If you feel you have received this communication in error or would no longer like to receive these types of communications, please e-mail [email or web address]

## 2019-09-25 ENCOUNTER — PATIENT MESSAGE (OUTPATIENT)
Dept: OPHTHALMOLOGY | Facility: CLINIC | Age: 79
End: 2019-09-25

## 2019-10-23 ENCOUNTER — PATIENT MESSAGE (OUTPATIENT)
Dept: OPHTHALMOLOGY | Facility: CLINIC | Age: 79
End: 2019-10-23

## 2019-10-25 ENCOUNTER — PATIENT MESSAGE (OUTPATIENT)
Dept: OPHTHALMOLOGY | Facility: CLINIC | Age: 79
End: 2019-10-25

## 2019-10-28 NOTE — PROGRESS NOTES
HPI     Glaucoma      Additional comments: 6 week IOP ck               Decreased Visual Acuity      Additional comments: Pt c/o blurry vision OS and feels that his vision   is worse than before              Comments     DLS: 9/24/19    1. POAG   2. PCIOL OS  3. NS OD  4. Disc at Risk    MEDS:  Latanoprost QAM OU          Last edited by Viky Ibarra MA on 10/29/2019  1:07 PM. (History)            Assessment /Plan     For exam results, see Encounter Report.    Primary open-angle glaucoma, bilateral, indeterminate stage    Bilateral ocular hypertension    Crowded optic disc, left    Senile nuclear sclerosis, right    Pseudophakia, left eye      79 year old male with hx of ocular hypertension here for glaucoma follow up   + POAG left > right   Started on gtts 9/24/2019        Glaucoma (type and duration)    High IOP    First HVF   8/2019   First photos   9/2019   Treatment / Drops started  9/24/2019            Family history    none        Glaucoma meds    none        H/O adverse rxn to glaucoma drops    none        LASERS    none        GLAUCOMA SURGERIES    none        OTHER EYE SURGERIES    Cataract surgery OS         CDR  0.2 // 0.05 (dilated disc vessels os )         Tbase    25-32/26-34         Tmax    32/34        Ttarget    Below 20         HVF    1 test 2019 to  2019 - SAD/IAD   od // SAD/IAD  (tunnel) os        Gonio    3+ iris bowed forward OD/ 3+ OS         CCT    597/607        OCT    1 test 2019 to 2019 - RNFL - wnl od // wnl bord IT  os        HRT    ??        Disc photos    9/2019    - Ttoday   22/23 (( down from  32/34 - good response to latanoprost )   - Test done today    IOP on latanoprost     2. PC IOL - OS    Falgout - Tibidoux - multifolcal     3. Disc at risk    Dilated vessels ON - os          PLAN   Pt in today for IOP check on latanoprost - called and asked to come in sooner   Pt feels his vision in the left eye is getting worse ( the Visual acuity is testing the same)   IOP much better  32/34 --> 22/23 w/ latanoprost   But even lower would be preferable   Cont  latanoprost ou q am   Add timolol ou bid   Check for apd os - pt notes vision getting worse     Pt had a lot of questions and concerns written out - answered all the question to the best of my ability     Keep F/U with HVF / OCT / IOP check on gtts - 3 months- already has the appt for January  Monitor for VF progression (last VF was 8/7/2019 )      Pt is a  - may in [facility]     Pt knows my friend Dr Elba Correa (in a exercise class with her )     Hemalatha Smart

## 2019-10-29 ENCOUNTER — OFFICE VISIT (OUTPATIENT)
Dept: OPHTHALMOLOGY | Facility: CLINIC | Age: 79
End: 2019-10-29
Payer: MEDICARE

## 2019-10-29 DIAGNOSIS — H47.332 CROWDED OPTIC DISC, LEFT: ICD-10-CM

## 2019-10-29 DIAGNOSIS — H40.053 BILATERAL OCULAR HYPERTENSION: ICD-10-CM

## 2019-10-29 DIAGNOSIS — Z96.1 PSEUDOPHAKIA, LEFT EYE: ICD-10-CM

## 2019-10-29 DIAGNOSIS — H25.11 SENILE NUCLEAR SCLEROSIS, RIGHT: ICD-10-CM

## 2019-10-29 DIAGNOSIS — H40.1134 PRIMARY OPEN-ANGLE GLAUCOMA, BILATERAL, INDETERMINATE STAGE: Primary | ICD-10-CM

## 2019-10-29 PROCEDURE — 99999 PR PBB SHADOW E&M-EST. PATIENT-LVL II: ICD-10-PCS | Mod: PBBFAC,,, | Performed by: OPHTHALMOLOGY

## 2019-10-29 PROCEDURE — 99999 PR PBB SHADOW E&M-EST. PATIENT-LVL II: CPT | Mod: PBBFAC,,, | Performed by: OPHTHALMOLOGY

## 2019-10-29 PROCEDURE — 92012 PR EYE EXAM, EST PATIENT,INTERMED: ICD-10-PCS | Mod: S$GLB,,, | Performed by: OPHTHALMOLOGY

## 2019-10-29 PROCEDURE — 92012 INTRM OPH EXAM EST PATIENT: CPT | Mod: S$GLB,,, | Performed by: OPHTHALMOLOGY

## 2019-10-30 DIAGNOSIS — H40.1134 PRIMARY OPEN ANGLE GLAUCOMA (POAG) OF BOTH EYES, INDETERMINATE STAGE: Primary | ICD-10-CM

## 2019-10-30 RX ORDER — TIMOLOL MALEATE 5 MG/ML
1 SOLUTION/ DROPS OPHTHALMIC 2 TIMES DAILY
Qty: 5 ML | Refills: 12 | Status: SHIPPED | OUTPATIENT
Start: 2019-10-30 | End: 2020-01-14

## 2019-10-30 RX ORDER — TIMOLOL MALEATE 5 MG/ML
1 SOLUTION/ DROPS OPHTHALMIC 2 TIMES DAILY
COMMUNITY
Start: 2019-10-30 | End: 2019-10-30 | Stop reason: SDUPTHER

## 2019-11-01 ENCOUNTER — PATIENT MESSAGE (OUTPATIENT)
Dept: OPHTHALMOLOGY | Facility: CLINIC | Age: 79
End: 2019-11-01

## 2019-11-22 ENCOUNTER — PATIENT MESSAGE (OUTPATIENT)
Dept: OPHTHALMOLOGY | Facility: CLINIC | Age: 79
End: 2019-11-22

## 2020-01-14 ENCOUNTER — OFFICE VISIT (OUTPATIENT)
Dept: OPHTHALMOLOGY | Facility: CLINIC | Age: 80
End: 2020-01-14
Payer: MEDICARE

## 2020-01-14 ENCOUNTER — CLINICAL SUPPORT (OUTPATIENT)
Dept: OPHTHALMOLOGY | Facility: CLINIC | Age: 80
End: 2020-01-14
Payer: MEDICARE

## 2020-01-14 DIAGNOSIS — Z96.1 PSEUDOPHAKIA, LEFT EYE: ICD-10-CM

## 2020-01-14 DIAGNOSIS — H25.11 SENILE NUCLEAR SCLEROSIS, RIGHT: ICD-10-CM

## 2020-01-14 DIAGNOSIS — H47.332 CROWDED OPTIC DISC, LEFT: ICD-10-CM

## 2020-01-14 DIAGNOSIS — H40.1134 PRIMARY OPEN-ANGLE GLAUCOMA, BILATERAL, INDETERMINATE STAGE: Primary | ICD-10-CM

## 2020-01-14 DIAGNOSIS — H40.053 BILATERAL OCULAR HYPERTENSION: ICD-10-CM

## 2020-01-14 PROCEDURE — 92133 CPTRZD OPH DX IMG PST SGM ON: CPT | Mod: S$GLB,,, | Performed by: OPHTHALMOLOGY

## 2020-01-14 PROCEDURE — 99999 PR PBB SHADOW E&M-EST. PATIENT-LVL II: CPT | Mod: PBBFAC,,, | Performed by: OPHTHALMOLOGY

## 2020-01-14 PROCEDURE — 99999 PR PBB SHADOW E&M-EST. PATIENT-LVL II: ICD-10-PCS | Mod: PBBFAC,,, | Performed by: OPHTHALMOLOGY

## 2020-01-14 PROCEDURE — 92012 PR EYE EXAM, EST PATIENT,INTERMED: ICD-10-PCS | Mod: S$GLB,,, | Performed by: OPHTHALMOLOGY

## 2020-01-14 PROCEDURE — 92133 POSTERIOR SEGMENT OCT OPTIC NERVE(OCULAR COHERENCE TOMOGRAPHY) - OU - BOTH EYES: ICD-10-PCS | Mod: S$GLB,,, | Performed by: OPHTHALMOLOGY

## 2020-01-14 PROCEDURE — 92012 INTRM OPH EXAM EST PATIENT: CPT | Mod: S$GLB,,, | Performed by: OPHTHALMOLOGY

## 2020-01-14 PROCEDURE — 92083 HUMPHREY VISUAL FIELD - OU - BOTH EYES: ICD-10-PCS | Mod: S$GLB,,, | Performed by: OPHTHALMOLOGY

## 2020-01-14 PROCEDURE — 92083 EXTENDED VISUAL FIELD XM: CPT | Mod: S$GLB,,, | Performed by: OPHTHALMOLOGY

## 2020-01-14 RX ORDER — DORZOLAMIDE HYDROCHLORIDE AND TIMOLOL MALEATE 20; 5 MG/ML; MG/ML
1 SOLUTION/ DROPS OPHTHALMIC 2 TIMES DAILY
Qty: 3 BOTTLE | Refills: 3 | Status: SHIPPED | OUTPATIENT
Start: 2020-01-14 | End: 2020-12-21

## 2020-01-14 NOTE — PROGRESS NOTES
HPI     DLS: 10/29/19    Pt here for HVF review;  Pt states he doesn't wear his eye glasses.     Meds; Latanoprost QAM OU              Timolol BID OU    1. POAG   2. PCIOL OS   3. NS OD   4. Disc at Risk     Last edited by Shanna Mckenzie on 1/14/2020 11:52 AM. (History)              Assessment /Plan     For exam results, see Encounter Report.    Primary open-angle glaucoma, bilateral, indeterminate stage    Bilateral ocular hypertension    Crowded optic disc, left    Senile nuclear sclerosis, right    Pseudophakia, left eye          79 year old male with hx of ocular hypertension here for glaucoma follow up   + POAG left > right   Started on gtts 9/24/2019        Glaucoma (type and duration)    High IOP    First HVF   8/2019   First photos   9/2019   Treatment / Drops started  9/24/2019            Family history    none        Glaucoma meds    Latanoprost / timolol         H/O adverse rxn to glaucoma drops    none        LASERS    none        GLAUCOMA SURGERIES    none        OTHER EYE SURGERIES    Cataract surgery OS         CDR  0.2 // 0.05 (dilated disc vessels os )         Tbase    25-32/26-34         Tmax    32/34        Ttarget    Below 20         HVF    2 test 2019 to  2020 - SAD/IAD   od // SAD/IAD  (tunnel) os        Gonio    3+ iris bowed forward OD/ 3+ OS         CCT    597/607        OCT    1 test 2019 to 2019 - RNFL - wnl od // wnl bord IT  os        HRT    ??        Disc photos    9/2019    - Ttoday   21/17 (( down from  32/34 - good response to latanoprost )   - Test done today    IOP on latanoprost on timolol    2. PC IOL - OS    Falgout - Tibidoux - multifolcal     3. Disc at risk    Dilated vessels ON - os          PLAN   Pt in today for IOP check   Pt feels his vision in the left eye is getting worse ( the Visual acuity is testing the same)   IOP much better w/ latanoprost 32/34 --> 22/23   22/23 --> 21/17  (latanoprsot and timolol)   Still higher than ideal - change timolol to cosopt - pt warned  that it will burn more     Check for apd os - pt notes vision getting worse     HVF stable ou - but worse than expected by ON and OCT RNFL scan     Pt had a lot of questions and concerns written out - answered all the question to the best of my ability   Pt is keeping a log of every time he puts his gtts in     Keep F/U with HRT // IOP check with switch from timolol to cosopt       Pt is a  - may in [facility]     Pt knows my friend Dr Elba Correa (in a exercise class with her )     Hemalatha Smart

## 2020-01-15 ENCOUNTER — PATIENT MESSAGE (OUTPATIENT)
Dept: OPHTHALMOLOGY | Facility: CLINIC | Age: 80
End: 2020-01-15

## 2020-05-22 ENCOUNTER — LAB VISIT (OUTPATIENT)
Dept: PRIMARY CARE CLINIC | Facility: CLINIC | Age: 80
End: 2020-05-22
Payer: MEDICARE

## 2020-05-22 DIAGNOSIS — R05.9 COUGH: Primary | ICD-10-CM

## 2020-05-22 PROCEDURE — U0003 INFECTIOUS AGENT DETECTION BY NUCLEIC ACID (DNA OR RNA); SEVERE ACUTE RESPIRATORY SYNDROME CORONAVIRUS 2 (SARS-COV-2) (CORONAVIRUS DISEASE [COVID-19]), AMPLIFIED PROBE TECHNIQUE, MAKING USE OF HIGH THROUGHPUT TECHNOLOGIES AS DESCRIBED BY CMS-2020-01-R: HCPCS

## 2020-05-22 NOTE — PROGRESS NOTES
Swab collected per CDC and [facility] guidelines     Patient left with mask in place and instructions on receiving results.

## 2020-05-24 LAB — SARS-COV-2 RNA RESP QL NAA+PROBE: NOT DETECTED

## 2020-05-26 ENCOUNTER — OFFICE VISIT (OUTPATIENT)
Dept: OPHTHALMOLOGY | Facility: CLINIC | Age: 80
End: 2020-05-26
Payer: MEDICARE

## 2020-05-26 DIAGNOSIS — H25.11 SENILE NUCLEAR SCLEROSIS, RIGHT: ICD-10-CM

## 2020-05-26 DIAGNOSIS — H40.053 BILATERAL OCULAR HYPERTENSION: ICD-10-CM

## 2020-05-26 DIAGNOSIS — H40.1134 PRIMARY OPEN-ANGLE GLAUCOMA, BILATERAL, INDETERMINATE STAGE: Primary | ICD-10-CM

## 2020-05-26 DIAGNOSIS — H47.332 CROWDED OPTIC DISC, LEFT: ICD-10-CM

## 2020-05-26 DIAGNOSIS — Z96.1 PSEUDOPHAKIA, LEFT EYE: ICD-10-CM

## 2020-05-26 PROCEDURE — 92012 PR EYE EXAM, EST PATIENT,INTERMED: ICD-10-PCS | Mod: S$GLB,,, | Performed by: OPHTHALMOLOGY

## 2020-05-26 PROCEDURE — 99999 PR PBB SHADOW E&M-EST. PATIENT-LVL II: ICD-10-PCS | Mod: PBBFAC,,, | Performed by: OPHTHALMOLOGY

## 2020-05-26 PROCEDURE — 92012 INTRM OPH EXAM EST PATIENT: CPT | Mod: S$GLB,,, | Performed by: OPHTHALMOLOGY

## 2020-05-26 PROCEDURE — 99999 PR PBB SHADOW E&M-EST. PATIENT-LVL II: CPT | Mod: PBBFAC,,, | Performed by: OPHTHALMOLOGY

## 2020-05-26 NOTE — PROGRESS NOTES
HPI     Glaucoma      Additional comments: IOP ck today               Comments     DLS: 1/14/20    1. POAG   2. PCIOL OS  3. NS OD  4. Disc at Risk    MEDS:  Cosopt BID OU  Latanoprost QAM OU          Last edited by Viky Ibarra MA on 5/26/2020  2:57 PM. (History)              Assessment /Plan     For exam results, see Encounter Report.    Primary open-angle glaucoma, bilateral, indeterminate stage    Bilateral ocular hypertension    Crowded optic disc, left    Senile nuclear sclerosis, right    Pseudophakia, left eye        79 year old male with hx of ocular hypertension here for glaucoma follow up   + POAG left > right   Started on gtts 9/24/2019        Glaucoma (type and duration)    High IOP    First HVF   8/2019   First photos   9/2019   Treatment / Drops started  9/24/2019            Family history    none        Glaucoma meds    Latanoprost / cosopt          H/O adverse rxn to glaucoma drops    none        LASERS    none        GLAUCOMA SURGERIES    none        OTHER EYE SURGERIES    Cataract surgery OS         CDR  0.2 // 0.05 (dilated disc vessels os )         Tbase    25-32/26-34         Tmax    32/34        Ttarget    Below 20         HVF    2 test 2019 to  2020 - SAD/IAD   od // SAD/IAD  (tunnel) os        Gonio    3+ iris bowed forward OD/ 3+ OS         CCT    597/607        OCT    1 test 2019 to 2019 - RNFL - wnl od // wnl bord IT  os        HRT    ??        Disc photos    9/2019    - Ttoday  18//22 (( down from  32/34 - good response to drops )   - Test done today    IOP on latanoprost and cosopt     2. PC IOL - OS    Falgout - Tibidoux - multifolcal     3. Disc at risk    Dilated vessels ON - os          PLAN   Pt in today for IOP check   IOP  Close to target - 18/22 22/23 --> 21/17  (latanoprsot and cosopt )     HVF stable ou - but worse than expected by ON and OCT RNFL scan     Pt had a lot of questions and concerns written out - answered all the question to the best of my ability   Pt is keeping  a log of every time he puts his gtts in     Keep F/U with IOP and HRT / gonio     Pt is a  - may in time want to [facility]     Pt knows my friend Dr Elba Correa (in a exercise class with her )     Hemalatha Smart

## 2020-11-03 ENCOUNTER — OFFICE VISIT (OUTPATIENT)
Dept: URGENT CARE | Facility: CLINIC | Age: 80
End: 2020-11-03
Payer: MEDICARE

## 2020-11-03 VITALS
BODY MASS INDEX: 25.83 KG/M2 | SYSTOLIC BLOOD PRESSURE: 178 MMHG | RESPIRATION RATE: 18 BRPM | HEIGHT: 65 IN | OXYGEN SATURATION: 97 % | DIASTOLIC BLOOD PRESSURE: 78 MMHG | HEART RATE: 46 BPM | WEIGHT: 155 LBS | TEMPERATURE: 98 F

## 2020-11-03 DIAGNOSIS — R03.0 ELEVATED BLOOD PRESSURE READING: ICD-10-CM

## 2020-11-03 DIAGNOSIS — R05.9 COUGH: Primary | ICD-10-CM

## 2020-11-03 DIAGNOSIS — R00.1 BRADYCARDIA: ICD-10-CM

## 2020-11-03 DIAGNOSIS — Z20.822 SUSPECTED COVID-19 VIRUS INFECTION: ICD-10-CM

## 2020-11-03 LAB
CTP QC/QA: YES
SARS-COV-2 RDRP RESP QL NAA+PROBE: NEGATIVE

## 2020-11-03 PROCEDURE — 99214 OFFICE O/P EST MOD 30 MIN: CPT | Mod: S$GLB,,, | Performed by: STUDENT IN AN ORGANIZED HEALTH CARE EDUCATION/TRAINING PROGRAM

## 2020-11-03 PROCEDURE — U0002 COVID-19 LAB TEST NON-CDC: HCPCS | Mod: QW,S$GLB,, | Performed by: STUDENT IN AN ORGANIZED HEALTH CARE EDUCATION/TRAINING PROGRAM

## 2020-11-03 PROCEDURE — 93010 ELECTROCARDIOGRAM REPORT: CPT | Mod: S$GLB,,, | Performed by: INTERNAL MEDICINE

## 2020-11-03 PROCEDURE — 93005 EKG 12-LEAD: ICD-10-PCS | Mod: S$GLB,,, | Performed by: STUDENT IN AN ORGANIZED HEALTH CARE EDUCATION/TRAINING PROGRAM

## 2020-11-03 PROCEDURE — 99214 PR OFFICE/OUTPT VISIT, EST, LEVL IV, 30-39 MIN: ICD-10-PCS | Mod: S$GLB,,, | Performed by: STUDENT IN AN ORGANIZED HEALTH CARE EDUCATION/TRAINING PROGRAM

## 2020-11-03 PROCEDURE — 93010 EKG 12-LEAD: ICD-10-PCS | Mod: S$GLB,,, | Performed by: INTERNAL MEDICINE

## 2020-11-03 PROCEDURE — 93005 ELECTROCARDIOGRAM TRACING: CPT | Mod: S$GLB,,, | Performed by: STUDENT IN AN ORGANIZED HEALTH CARE EDUCATION/TRAINING PROGRAM

## 2020-11-03 PROCEDURE — U0002: ICD-10-PCS | Mod: QW,S$GLB,, | Performed by: STUDENT IN AN ORGANIZED HEALTH CARE EDUCATION/TRAINING PROGRAM

## 2020-11-03 NOTE — PATIENT INSTRUCTIONS
Bradycardia    When your heart rate is slow, less than 60 beats per minute, it is called bradycardia. Bradycardia can be normal, caused by medicines, or a sign of a disease. The slow heart rate may not be constant; it can come and go. It is a concern when it is very low, or you have symptoms.  Signs and symptoms  The following are signs and symptoms of bradycardia:  · Heart rate less than 60 per minute  · Dizziness or feeling lightheaded  · Weakness  · Trouble breathing  · Fainting  · Sleepiness  · More trouble exercising than usual because of fatigue  · Confusion or trouble concentrating  Causes  There are many causes of bradycardia. Some can be related to your heart, but some may be related to other factors.  Non-heart-related causes:  · Advanced age  · Side effect of certain medicines (such as beta-blockers, calcium channel blockers, digitalis, antiarrhythmic medicines like amiodarone, clonidine, lithium)  · Medical conditions such as hypoglycemia (low blood sugar), hypothyroidism (low thyroid), electrolyte disorder,  hypothermia, sleep apnea  · Athletes, especially long-distance runners, may have a slow heart rate. This can be normal.  · Sleep apnea  · Brain injury such as stroke or bleeding inside the brain  Heart-related causes:  · Coronary artery disease (angina or prior heart attack, also known as acute myocardial infarction, or AMI)  · Heart valve disease  · Heart muscle disease (cardiomyopathy)  · Congestive heart failure  · Sick sinus syndrome, which is when your heart's natural pacemaker is no longer working properly  · Diseases that infiltrate the heart such as sarcoid  · Heart infections  Sometimes the cause for the arrhythmia cannot be found.  Bradycardia that causes symptoms is sometimes reversible, and can be treated with medicines. When more severe bradycardia persists, a pacemaker is generally recommended. When the bradycardia does not cause symptoms, your doctor may decide to evaluate it in his  or her office.  Home care  The following will help you care for yourself at home:  · Resume your usual activities when you are feeling back to normal.  · If you develop any of the symptoms below during exertion, then you should not exert yourself until evaluated further by your doctor.  · Work with your doctor on any needed lifestyle changes, such as changing your diet, stopping smoking if you are a smoker, and a planned exercise program.  Follow-up care  Follow up with your doctor, or as advised.  Call 911  Call 911 if any of the following occur:  · Chest pain  · Trouble breathing  · Slow heart rate with dizziness or lightheadedness  · Fainting or loss of consciousness  · Chest, shoulder, arm, neck, or back pain  · Slow heart rate (under 50 beats per minute) if associated with symptoms  When to seek medical advice  Get prompt medical attention if any of the following occur:  · Occasional weakness, dizziness, or lightheadedness  Date Last Reviewed: 4/25/2016  © 1960-7789 [employer]. [address] [place] [address], [place] [address]. All rights reserved. This information is not intended as a substitute for professional medical care. Always follow your healthcare professional's instructions.

## 2020-11-03 NOTE — PROGRESS NOTES
"Subjective:       Patient ID: Bao Mckeon is a 80 y.o. male.    Vitals:  height is 5' 5" (1.651 m) and weight is 70.3 kg (155 lb). His temperature is 98 °F (36.7 °C). His blood pressure is 178/78 (abnormal) and his pulse is 46 (abnormal). His respiration is 18 and oxygen saturation is 97%.     Chief Complaint: COVID-19 Concerns    Pt presents for COVID-19 concerns. Reports fatigue, body aches/back pain, and mild productive cough for 4-5 days. No known sick contacts. Denied f/c, GI sx's, HA, dizziness, chest pain, SoB, Taylor, peripheral edema, presyncope/syncope. Of note, pt states he normally has slow heart rate, though typically ~60bpm. States he is tolerating physical activity as normal.    Fatigue  This is a new problem. The current episode started in the past 7 days. The problem occurs constantly. The problem has been unchanged. Associated symptoms include congestion, coughing, fatigue and myalgias. Pertinent negatives include no abdominal pain, arthralgias, chest pain, chills, diaphoresis, fever, headaches, joint swelling, nausea, neck pain, numbness, rash, sore throat, vomiting or weakness. Nothing aggravates the symptoms. He has tried nothing for the symptoms. The treatment provided no relief.       Constitution: Positive for fatigue. Negative for chills, sweating and fever.   HENT: Positive for congestion. Negative for ear pain, sore throat and trouble swallowing.    Neck: Negative for neck pain and painful lymph nodes.   Cardiovascular: Negative for chest pain, leg swelling, palpitations, sob on exertion and passing out.   Eyes: Negative for eye pain, double vision and blurred vision.   Respiratory: Positive for cough and sputum production. Negative for chest tightness, bloody sputum, shortness of breath, stridor and wheezing.    Gastrointestinal: Negative for abdominal pain, nausea, vomiting and diarrhea.   Genitourinary: Negative for dysuria, frequency and urgency.   Musculoskeletal: Positive for back " pain and muscle ache. Negative for joint pain, joint swelling and muscle cramps.   Skin: Negative for color change, pale, rash and lesion.   Allergic/Immunologic: Negative for seasonal allergies.   Neurological: Negative for dizziness, light-headedness, passing out, coordination disturbances, headaches and numbness.   Hematologic/Lymphatic: Negative for swollen lymph nodes, easy bruising/bleeding and history of blood clots. Does not bruise/bleed easily.   Psychiatric/Behavioral: Negative for nervous/anxious, sleep disturbance and depression. The patient is not nervous/anxious.        Objective:      Physical Exam   Constitutional: He is oriented to person, place, and time. He appears well-developed. He does not appear ill. No distress.   HENT:   Head: Normocephalic and atraumatic.   Ears:   Right Ear: External ear normal.   Left Ear: External ear normal.   Eyes: Conjunctivae and EOM are normal. Right eye exhibits no discharge. Left eye exhibits no discharge. No scleral icterus.   Neck: Normal range of motion. Neck supple.   Cardiovascular: Regular rhythm and normal heart sounds. Bradycardia present.   No murmur heard.  Pulmonary/Chest: Effort normal and breath sounds normal. No respiratory distress. He has no wheezes. He has no rhonchi. He has no rales.   Musculoskeletal: Normal range of motion.         General: No tenderness.      Right lower leg: No edema.      Left lower leg: No edema.   Neurological: He is alert and oriented to person, place, and time. No cranial nerve deficit (CN II-XII grossly intact).   Skin: Skin is warm, dry and no rash. Psychiatric: His behavior is normal. Judgment and thought content normal.   Nursing note and vitals reviewed.    Recent Lab Results       11/03/20  1252         Acceptable Yes     SARS-CoV-2 RNA, Amplification, Qual Negative         EKG: Sinus bradycardia with sinus arrhythmia (46bpm) without ectopy, no significant ST/TW abnormalities, no prior to compare to  ( physician interpretation)      Assessment:       1. Cough    2. Suspected COVID-19 virus infection    3. Bradycardia    4. Elevated blood pressure reading        Plan:         Cough  -     IN OFFICE EKG 12-LEAD (to Muse) - study independently reviewed and interpreted by  physician and discussed results with patient    Suspected COVID-19 virus infection  -     POCT COVID-19 Rapid Screening; discussed negative result with patient. Counseled Symptomatic patient without known exposure to continue home quarantine/isolation per CDC guidelines in event of false negative rapid COVID test    Bradycardia  - pt with bradycardia @46bpm on EKG but denies symptoms; previous visits with HR in high 50s-low 60's  - discussed Cardiology consult, pt reports he has outside Cardiologist he saw a few years ago with negative Cardiac work-up and will follow-up with them  - strict ED return precautions given if worsening symptoms, questions answered, and pt expressed understanding    Elevated blood pressure reading  - pt denies hx of HTN but has had multiple elevated readings in past, to f/u with PCP and Cardiology    Results and diagnosis reviewed with patient, questions answered, and return precautions given    Follow up in about 1 week (around 11/10/2020) for re-evaluation with Cardiologist, or sooner with ED if worsening symptoms.    Thanh Rico MD/MPH  [facility]  [facility]

## 2020-11-10 ENCOUNTER — OFFICE VISIT (OUTPATIENT)
Dept: OPHTHALMOLOGY | Facility: CLINIC | Age: 80
End: 2020-11-10
Payer: MEDICARE

## 2020-11-10 DIAGNOSIS — H47.332 CROWDED OPTIC DISC, LEFT: ICD-10-CM

## 2020-11-10 DIAGNOSIS — H40.1134 PRIMARY OPEN-ANGLE GLAUCOMA, BILATERAL, INDETERMINATE STAGE: Primary | ICD-10-CM

## 2020-11-10 DIAGNOSIS — H25.11 SENILE NUCLEAR SCLEROSIS, RIGHT: ICD-10-CM

## 2020-11-10 DIAGNOSIS — H40.053 BILATERAL OCULAR HYPERTENSION: ICD-10-CM

## 2020-11-10 PROCEDURE — 99999 PR PBB SHADOW E&M-EST. PATIENT-LVL II: ICD-10-PCS | Mod: PBBFAC,,, | Performed by: OPHTHALMOLOGY

## 2020-11-10 PROCEDURE — 92020 PR SPECIAL EYE EVAL,GONIOSCOPY: ICD-10-PCS | Mod: S$GLB,,, | Performed by: OPHTHALMOLOGY

## 2020-11-10 PROCEDURE — 92020 GONIOSCOPY: CPT | Mod: S$GLB,,, | Performed by: OPHTHALMOLOGY

## 2020-11-10 PROCEDURE — 92012 INTRM OPH EXAM EST PATIENT: CPT | Mod: S$GLB,,, | Performed by: OPHTHALMOLOGY

## 2020-11-10 PROCEDURE — 92133 CPTRZD OPH DX IMG PST SGM ON: CPT | Mod: S$GLB,,, | Performed by: OPHTHALMOLOGY

## 2020-11-10 PROCEDURE — 92012 PR EYE EXAM, EST PATIENT,INTERMED: ICD-10-PCS | Mod: S$GLB,,, | Performed by: OPHTHALMOLOGY

## 2020-11-10 PROCEDURE — 92133 HEIDELBERG RETINA TOMOGRAPHY (HRT) - OU - BOTH EYES: ICD-10-PCS | Mod: S$GLB,,, | Performed by: OPHTHALMOLOGY

## 2020-11-10 PROCEDURE — 99999 PR PBB SHADOW E&M-EST. PATIENT-LVL II: CPT | Mod: PBBFAC,,, | Performed by: OPHTHALMOLOGY

## 2020-11-10 NOTE — PROGRESS NOTES
HPI     DLS: 5/26/20    Pt here for 4 month check;    Meds:   Cosopt BID OU   Latanoprost QAM OU     1. POAG   2. PCIOL OS   3. NS OD   4. Disc at Risk    Last edited by Shanna Mckenzie on 11/10/2020  3:11 PM. (History)        Assessment /Plan     For exam results, see Encounter Report.    Primary open-angle glaucoma, bilateral, indeterminate stage    Bilateral ocular hypertension    Crowded optic disc, left    Senile nuclear sclerosis, right      79 year old male with hx of ocular hypertension here for glaucoma follow up   + POAG left > right   Started on gtts 9/24/2019        Glaucoma (type and duration)    High IOP    First HVF   8/2019   First photos   9/2019   Treatment / Drops started  9/24/2019            Family history    none        Glaucoma meds    Latanoprost / cosopt          H/O adverse rxn to glaucoma drops    none        LASERS    none        GLAUCOMA SURGERIES    none        OTHER EYE SURGERIES    Cataract surgery OS         CDR  0.2 // 0.05 (dilated disc vessels os )         Tbase    25-32/26-34         Tmax    32/34        Ttarget    Below 20         HVF    2 test 2019 to  2020 - SAD/IAD   od // SAD/IAD  (tunnel) os        Gonio    3+ iris bowed forward OD/ 3+ OS         CCT    597/607        OCT    1 test 2019 to 2019 - RNFL - wnl od // wnl bord IT  os        HRT    1 test 2020 to 2020 - MR -  full od // full os /// CDR 0.12 od // 0.26 os         Disc photos    9/2019    - Ttoday  16/16 (( down from  32/34 - good response to drops )   - Test done today    IOP / HRT     2. PC IOL - OS    Falgout - Tibidoux - multifolcal     3. Disc at risk    Dilated vessels ON - os          PLAN   Pt in today for IOP check   IOP  Close to target - 18/22  22/23 --> 21/17  (latanoprsot and cosopt )     HVF stable ou - but worse than expected by ON and OCT RNFL scan     Pt had a lot of questions and concerns written out - answered all the question to the best of my ability   Pt is keeping a log of every time he puts his  gtts in     11/10/20  IOP great, well below goal  HRT normal OU    Keep F/U with IOP and HVF / DFE / OCT     Pt is a  - may in [facility]     When ever wants cataract surgery od - can refer to cami or debi - suggest a multifocal IOL od - to [facility] up with the one he has already had os - Dr Lang     Pt knows my friend Dr Elba Correa (in a exercise class with her )

## 2020-12-18 ENCOUNTER — PATIENT MESSAGE (OUTPATIENT)
Dept: OPHTHALMOLOGY | Facility: CLINIC | Age: 80
End: 2020-12-18

## 2020-12-21 ENCOUNTER — OFFICE VISIT (OUTPATIENT)
Dept: OPHTHALMOLOGY | Facility: CLINIC | Age: 80
End: 2020-12-21
Payer: MEDICARE

## 2020-12-21 ENCOUNTER — TELEPHONE (OUTPATIENT)
Dept: OPTOMETRY | Facility: CLINIC | Age: 80
End: 2020-12-21

## 2020-12-21 DIAGNOSIS — H40.1134 PRIMARY OPEN-ANGLE GLAUCOMA, BILATERAL, INDETERMINATE STAGE: Primary | ICD-10-CM

## 2020-12-21 DIAGNOSIS — Z96.1 PSEUDOPHAKIA, LEFT EYE: ICD-10-CM

## 2020-12-21 DIAGNOSIS — H40.053 BILATERAL OCULAR HYPERTENSION: ICD-10-CM

## 2020-12-21 DIAGNOSIS — H25.11 SENILE NUCLEAR SCLEROSIS, RIGHT: ICD-10-CM

## 2020-12-21 DIAGNOSIS — H47.332 CROWDED OPTIC DISC, LEFT: ICD-10-CM

## 2020-12-21 PROCEDURE — 1101F PT FALLS ASSESS-DOCD LE1/YR: CPT | Mod: CPTII,S$GLB,, | Performed by: OPHTHALMOLOGY

## 2020-12-21 PROCEDURE — 3288F PR FALLS RISK ASSESSMENT DOCUMENTED: ICD-10-PCS | Mod: CPTII,S$GLB,, | Performed by: OPHTHALMOLOGY

## 2020-12-21 PROCEDURE — 99999 PR PBB SHADOW E&M-EST. PATIENT-LVL II: CPT | Mod: PBBFAC,,, | Performed by: OPHTHALMOLOGY

## 2020-12-21 PROCEDURE — 92012 PR EYE EXAM, EST PATIENT,INTERMED: ICD-10-PCS | Mod: S$GLB,,, | Performed by: OPHTHALMOLOGY

## 2020-12-21 PROCEDURE — 1101F PR PT FALLS ASSESS DOC 0-1 FALLS W/OUT INJ PAST YR: ICD-10-PCS | Mod: CPTII,S$GLB,, | Performed by: OPHTHALMOLOGY

## 2020-12-21 PROCEDURE — 99999 PR PBB SHADOW E&M-EST. PATIENT-LVL II: ICD-10-PCS | Mod: PBBFAC,,, | Performed by: OPHTHALMOLOGY

## 2020-12-21 PROCEDURE — 1126F AMNT PAIN NOTED NONE PRSNT: CPT | Mod: S$GLB,,, | Performed by: OPHTHALMOLOGY

## 2020-12-21 PROCEDURE — 3288F FALL RISK ASSESSMENT DOCD: CPT | Mod: CPTII,S$GLB,, | Performed by: OPHTHALMOLOGY

## 2020-12-21 PROCEDURE — 1126F PR PAIN SEVERITY QUANTIFIED, NO PAIN PRESENT: ICD-10-PCS | Mod: S$GLB,,, | Performed by: OPHTHALMOLOGY

## 2020-12-21 PROCEDURE — 92012 INTRM OPH EXAM EST PATIENT: CPT | Mod: S$GLB,,, | Performed by: OPHTHALMOLOGY

## 2020-12-21 RX ORDER — LATANOPROST 50 UG/ML
1 SOLUTION/ DROPS OPHTHALMIC DAILY
Qty: 3 BOTTLE | Refills: 3 | Status: SHIPPED | OUTPATIENT
Start: 2020-12-21

## 2020-12-21 RX ORDER — DORZOLAMIDE HCL 20 MG/ML
1 SOLUTION/ DROPS OPHTHALMIC 3 TIMES DAILY
Qty: 30 ML | Refills: 3 | Status: SHIPPED | OUTPATIENT
Start: 2020-12-21 | End: 2023-04-19

## 2020-12-21 NOTE — Clinical Note
This pt of ours had to stop cosopt - 2/2 bradycardia - I started him back on dorzolamide alone tid and he is to cont latanoprost . Can you check his IOP in about 4 week - he C/O the wait for me. He has a appt with me for HVF ect in May   Thanks SHAD

## 2020-12-21 NOTE — TELEPHONE ENCOUNTER
----- Message from Shanna Mckenzie sent at 12/21/2020  2:26 PM CST -----  Dr. Smart wants this pt to see Dr. Frye for a pressure check in about 4 weeks. The only thing that came up was January 26th but pt stated that's to long of a wait to check his eye pressure since Dr. Smart just put him on another eye drop. Please call pt if you can get him in any sooner then the date I gave him.   Thank you so very much.   -Shanna

## 2020-12-21 NOTE — PROGRESS NOTES
HPI     Glaucoma      Additional comments: IOP ck today              Comments     DLS: 11/10/20 ([place] pt)  -Pt had EKG done on 11/3/20 and the results showed he had  brachycardia.   Pt read online that his drops may be causing it and so he stopped all   drops. Pt went to an outside optometrist to have IOP checked and it was   elevated. Pt re-started Latanoprost drops about 2 weeks ago.    1. POAG   2. PCIOL OS  3. NS OD  4. Disc at Risk    MEDS:  Cosopt BID OU = PT D/CDUE TO BRACHYCARDIA  Latanoprost QAM OU          Last edited by Viky Ibarra MA on 12/21/2020  1:37 PM. (History)            Assessment /Plan     For exam results, see Encounter Report.    Primary open-angle glaucoma, bilateral, indeterminate stage  -     dorzolamide (TRUSOPT) 2 % ophthalmic solution; Place 1 drop into both eyes 3 (three) times daily.  Dispense: 30 mL; Refill: 3  -     latanoprost 0.005 % ophthalmic solution; Place 1 drop into both eyes once daily.  Dispense: 3 Bottle; Refill: 3    Bilateral ocular hypertension    Crowded optic disc, left    Senile nuclear sclerosis, right    Pseudophakia, left eye        79 year old male with hx of ocular hypertension here for glaucoma follow up   + POAG left > right   Started on gtts 9/24/2019        Glaucoma (type and duration)    High IOP    First HVF   8/2019   First photos   9/2019   Treatment / Drops started  9/24/2019            Family history    none        Glaucoma meds    Latanoprost / cosopt - stop 2/2 bradycardia           H/O adverse rxn to glaucoma drops    Bradycardia - 2/2 BB         LASERS    none        GLAUCOMA SURGERIES    none        OTHER EYE SURGERIES    Cataract surgery OS         CDR  0.2 // 0.05 (dilated disc vessels os )         Tbase    25-32/26-34         Tmax    32/34        Ttarget    Below 20         HVF    2 test 2019 to  2020 - SAD/IAD   od // SAD/IAD  (tunnel) os        Gonio    3+ iris bowed forward OD/ 3+ OS         CCT    597/607        OCT    1 test 2019 to  Patient came in for blood pressure check.  Stated that she is not taking any blood pressure medications at the moment.  Patient provided list of blood pressure readings shown as listed below:        Morning  Afternoon  10/09/18:  140/94   107/72     10/10/18:  140/101  112/77    10/11/18:  151/103  123/74    10/12/18:  197/113  112/77    10/13/18:  142/103  123/81    10/14/18:  142/92   136/95    10/15/18:  153/91       10/16/18:  148/113    10/17/18:  150/95   133/79    Advised patient that I will inform her PCP of these readings and will contact her as soon as she responds.  Patient verbalized understanding.   2019 - RNFL - wnl od // wnl bord IT  os        HRT    1 test 2020 to 2020 - MR -  full od // full os /// CDR 0.12 od // 0.26 os         Disc photos    9/2019    - Ttoday  24/24 - up from 16/16 - off cosopt 2/2 bradycardia  (( down from  32/34 - good response to drops )   - Test done today    IOP  - off cosopt 2/2 bradycardia     2. PC IOL - OS    Riceky - Ljux - multifolcal     3. Disc at risk    Dilated vessels ON - os          PLAN   Pt in today for IOP check - had to stop cosopt - 2/2 bradycardia      HVF stable ou - but worse than expected by ON and OCT RNFL scan     Pt had a lot of questions and concerns written out - answered all the question to the best of my ability   Pt is keeping a log of every time he puts his gtts in     Pt wants to see cassi for an IOP check - with addition of dorzolamide  Tid     Keep F/U with IOP and HVF / DFE / OCT - March     Pt is a  - may in [facility]     When ever wants cataract surgery od - can refer to cami or debi - suggest a multifocal IOL od - to [facility] up with the one he has already had os - Dr Lang     Pt knows my friend Dr Elba Correa (in a exercise class with her )

## 2020-12-23 ENCOUNTER — TELEPHONE (OUTPATIENT)
Dept: OPTOMETRY | Facility: CLINIC | Age: 80
End: 2020-12-23

## 2020-12-23 NOTE — TELEPHONE ENCOUNTER
----- Message from Chichi Collins sent at 12/23/2020 12:11 PM CST -----  Contact: Bao @ [phone]  Pt  returning phone call regarding earlier appt day regarding reading of IOP.

## 2021-01-07 ENCOUNTER — PATIENT MESSAGE (OUTPATIENT)
Dept: OPHTHALMOLOGY | Facility: CLINIC | Age: 81
End: 2021-01-07

## 2021-01-08 ENCOUNTER — OFFICE VISIT (OUTPATIENT)
Dept: OPTOMETRY | Facility: CLINIC | Age: 81
End: 2021-01-08
Payer: MEDICARE

## 2021-01-08 DIAGNOSIS — H40.053 BILATERAL OCULAR HYPERTENSION: Primary | ICD-10-CM

## 2021-01-08 PROCEDURE — 92012 INTRM OPH EXAM EST PATIENT: CPT | Mod: S$GLB,,, | Performed by: OPTOMETRIST

## 2021-01-08 PROCEDURE — 99999 PR PBB SHADOW E&M-EST. PATIENT-LVL I: CPT | Mod: PBBFAC,,, | Performed by: OPTOMETRIST

## 2021-01-08 PROCEDURE — 99999 PR PBB SHADOW E&M-EST. PATIENT-LVL I: ICD-10-PCS | Mod: PBBFAC,,, | Performed by: OPTOMETRIST

## 2021-01-08 PROCEDURE — 92012 PR EYE EXAM, EST PATIENT,INTERMED: ICD-10-PCS | Mod: S$GLB,,, | Performed by: OPTOMETRIST

## 2021-01-18 ENCOUNTER — CLINICAL SUPPORT (OUTPATIENT)
Dept: URGENT CARE | Facility: CLINIC | Age: 81
End: 2021-01-18
Payer: MEDICARE

## 2021-01-18 DIAGNOSIS — Z11.59 SCREENING FOR VIRAL DISEASE: Primary | ICD-10-CM

## 2021-01-18 LAB
CTP QC/QA: YES
SARS-COV-2 RDRP RESP QL NAA+PROBE: NEGATIVE

## 2021-01-18 PROCEDURE — U0002 COVID-19 LAB TEST NON-CDC: HCPCS | Mod: QW,S$GLB,, | Performed by: PHYSICIAN ASSISTANT

## 2021-01-18 PROCEDURE — U0002: ICD-10-PCS | Mod: QW,S$GLB,, | Performed by: PHYSICIAN ASSISTANT

## 2021-01-29 ENCOUNTER — PATIENT MESSAGE (OUTPATIENT)
Dept: OPTOMETRY | Facility: CLINIC | Age: 81
End: 2021-01-29

## 2021-03-23 ENCOUNTER — PATIENT MESSAGE (OUTPATIENT)
Dept: OPHTHALMOLOGY | Facility: CLINIC | Age: 81
End: 2021-03-23

## 2021-03-23 ENCOUNTER — OFFICE VISIT (OUTPATIENT)
Dept: OPHTHALMOLOGY | Facility: CLINIC | Age: 81
End: 2021-03-23
Payer: MEDICARE

## 2021-03-23 ENCOUNTER — CLINICAL SUPPORT (OUTPATIENT)
Dept: OPHTHALMOLOGY | Facility: CLINIC | Age: 81
End: 2021-03-23
Payer: MEDICARE

## 2021-03-23 DIAGNOSIS — H25.11 SENILE NUCLEAR SCLEROSIS, RIGHT: ICD-10-CM

## 2021-03-23 DIAGNOSIS — H40.1134 PRIMARY OPEN-ANGLE GLAUCOMA, BILATERAL, INDETERMINATE STAGE: ICD-10-CM

## 2021-03-23 DIAGNOSIS — H40.1134 PRIMARY OPEN-ANGLE GLAUCOMA, BILATERAL, INDETERMINATE STAGE: Primary | ICD-10-CM

## 2021-03-23 DIAGNOSIS — Z96.1 PSEUDOPHAKIA, LEFT EYE: ICD-10-CM

## 2021-03-23 DIAGNOSIS — H40.053 BILATERAL OCULAR HYPERTENSION: ICD-10-CM

## 2021-03-23 DIAGNOSIS — H47.332 CROWDED OPTIC DISC, LEFT: ICD-10-CM

## 2021-03-23 PROCEDURE — 99999 PR PBB SHADOW E&M-EST. PATIENT-LVL II: ICD-10-PCS | Mod: PBBFAC,,, | Performed by: OPHTHALMOLOGY

## 2021-03-23 PROCEDURE — 92012 PR EYE EXAM, EST PATIENT,INTERMED: ICD-10-PCS | Mod: S$GLB,,, | Performed by: OPHTHALMOLOGY

## 2021-03-23 PROCEDURE — 99999 PR PBB SHADOW E&M-EST. PATIENT-LVL II: CPT | Mod: PBBFAC,,, | Performed by: OPHTHALMOLOGY

## 2021-03-23 PROCEDURE — 1101F PR PT FALLS ASSESS DOC 0-1 FALLS W/OUT INJ PAST YR: ICD-10-PCS | Mod: CPTII,S$GLB,, | Performed by: OPHTHALMOLOGY

## 2021-03-23 PROCEDURE — 3288F FALL RISK ASSESSMENT DOCD: CPT | Mod: CPTII,S$GLB,, | Performed by: OPHTHALMOLOGY

## 2021-03-23 PROCEDURE — 1126F AMNT PAIN NOTED NONE PRSNT: CPT | Mod: S$GLB,,, | Performed by: OPHTHALMOLOGY

## 2021-03-23 PROCEDURE — 92012 INTRM OPH EXAM EST PATIENT: CPT | Mod: S$GLB,,, | Performed by: OPHTHALMOLOGY

## 2021-03-23 PROCEDURE — 1101F PT FALLS ASSESS-DOCD LE1/YR: CPT | Mod: CPTII,S$GLB,, | Performed by: OPHTHALMOLOGY

## 2021-03-23 PROCEDURE — 1126F PR PAIN SEVERITY QUANTIFIED, NO PAIN PRESENT: ICD-10-PCS | Mod: S$GLB,,, | Performed by: OPHTHALMOLOGY

## 2021-03-23 PROCEDURE — 3288F PR FALLS RISK ASSESSMENT DOCUMENTED: ICD-10-PCS | Mod: CPTII,S$GLB,, | Performed by: OPHTHALMOLOGY

## 2021-03-23 RX ORDER — TAMSULOSIN HYDROCHLORIDE 0.4 MG/1
CAPSULE ORAL
COMMUNITY
Start: 2021-03-09 | End: 2023-04-19

## 2021-03-23 RX ORDER — CIPROFLOXACIN 500 MG/1
TABLET ORAL
COMMUNITY
Start: 2021-03-09 | End: 2023-04-19

## 2021-03-23 RX ORDER — BRIMONIDINE TARTRATE 2 MG/ML
1 SOLUTION/ DROPS OPHTHALMIC 2 TIMES DAILY
Qty: 30 ML | Refills: 3 | Status: SHIPPED | OUTPATIENT
Start: 2021-03-23 | End: 2023-04-19

## 2021-03-23 RX ORDER — ZOSTER VACCINE RECOMBINANT, ADJUVANTED 50 MCG/0.5
KIT INTRAMUSCULAR
COMMUNITY

## 2021-03-23 RX ORDER — TIMOLOL MALEATE 5 MG/ML
SOLUTION/ DROPS OPHTHALMIC
COMMUNITY
End: 2021-03-23

## 2021-03-24 ENCOUNTER — PATIENT MESSAGE (OUTPATIENT)
Dept: OPHTHALMOLOGY | Facility: CLINIC | Age: 81
End: 2021-03-24

## 2021-03-29 ENCOUNTER — TELEPHONE (OUTPATIENT)
Dept: OPHTHALMOLOGY | Facility: CLINIC | Age: 81
End: 2021-03-29

## 2021-04-16 ENCOUNTER — PATIENT MESSAGE (OUTPATIENT)
Dept: RESEARCH | Facility: HOSPITAL | Age: 81
End: 2021-04-16

## 2021-04-21 ENCOUNTER — OFFICE VISIT (OUTPATIENT)
Dept: URGENT CARE | Facility: CLINIC | Age: 81
End: 2021-04-21
Payer: MEDICARE

## 2021-04-21 VITALS
TEMPERATURE: 98 F | SYSTOLIC BLOOD PRESSURE: 114 MMHG | HEIGHT: 65 IN | DIASTOLIC BLOOD PRESSURE: 66 MMHG | HEART RATE: 62 BPM | WEIGHT: 155 LBS | BODY MASS INDEX: 25.83 KG/M2 | RESPIRATION RATE: 18 BRPM | OXYGEN SATURATION: 97 %

## 2021-04-21 DIAGNOSIS — Z11.59 SCREENING FOR VIRAL DISEASE: Primary | ICD-10-CM

## 2021-04-21 DIAGNOSIS — R63.0 LOSS OF APPETITE FOR MORE THAN 2 WEEKS: ICD-10-CM

## 2021-04-21 LAB
CTP QC/QA: YES
SARS-COV-2 RDRP RESP QL NAA+PROBE: NEGATIVE

## 2021-04-21 PROCEDURE — U0002 COVID-19 LAB TEST NON-CDC: HCPCS | Mod: QW,S$GLB,, | Performed by: FAMILY MEDICINE

## 2021-04-21 PROCEDURE — 99214 OFFICE O/P EST MOD 30 MIN: CPT | Mod: S$GLB,CS,, | Performed by: FAMILY MEDICINE

## 2021-04-21 PROCEDURE — 99214 PR OFFICE/OUTPT VISIT, EST, LEVL IV, 30-39 MIN: ICD-10-PCS | Mod: S$GLB,CS,, | Performed by: FAMILY MEDICINE

## 2021-04-21 PROCEDURE — U0002: ICD-10-PCS | Mod: QW,S$GLB,, | Performed by: FAMILY MEDICINE

## 2021-10-05 ENCOUNTER — CLINICAL SUPPORT (OUTPATIENT)
Dept: URGENT CARE | Facility: CLINIC | Age: 81
End: 2021-10-05
Payer: MEDICARE

## 2021-10-05 ENCOUNTER — OFFICE VISIT (OUTPATIENT)
Dept: URGENT CARE | Facility: CLINIC | Age: 81
End: 2021-10-05
Payer: COMMERCIAL

## 2021-10-05 VITALS — WEIGHT: 155 LBS | BODY MASS INDEX: 25.83 KG/M2 | HEIGHT: 65 IN

## 2021-10-05 DIAGNOSIS — Z11.59 SCREENING FOR VIRAL DISEASE: Primary | ICD-10-CM

## 2021-10-05 LAB
CTP QC/QA: YES
SARS-COV-2 RDRP RESP QL NAA+PROBE: NEGATIVE

## 2021-10-05 PROCEDURE — 1159F PR MEDICATION LIST DOCUMENTED IN MEDICAL RECORD: ICD-10-PCS | Mod: CPTII,S$GLB,, | Performed by: INTERNAL MEDICINE

## 2021-10-05 PROCEDURE — U0002: ICD-10-PCS | Mod: QW,S$GLB,, | Performed by: NURSE PRACTITIONER

## 2021-10-05 PROCEDURE — 1159F MED LIST DOCD IN RCRD: CPT | Mod: CPTII,S$GLB,, | Performed by: INTERNAL MEDICINE

## 2021-10-05 PROCEDURE — U0002 COVID-19 LAB TEST NON-CDC: HCPCS | Mod: QW,S$GLB,, | Performed by: NURSE PRACTITIONER

## 2021-11-19 ENCOUNTER — CLINICAL SUPPORT (OUTPATIENT)
Dept: URGENT CARE | Facility: CLINIC | Age: 81
End: 2021-11-19
Payer: MEDICARE

## 2021-11-19 DIAGNOSIS — Z11.59 SCREENING FOR VIRAL DISEASE: Primary | ICD-10-CM

## 2021-11-19 LAB
CTP QC/QA: YES
SARS-COV-2 RDRP RESP QL NAA+PROBE: NEGATIVE

## 2021-11-19 PROCEDURE — U0002: ICD-10-PCS | Mod: QW,S$GLB,, | Performed by: NURSE PRACTITIONER

## 2021-11-19 PROCEDURE — U0002 COVID-19 LAB TEST NON-CDC: HCPCS | Mod: QW,S$GLB,, | Performed by: NURSE PRACTITIONER

## 2021-11-19 NOTE — PROGRESS NOTES
Subjective:       Patient ID: Bao Mckeon is a 81 y.o. male.    Vitals:  vitals were not taken for this visit.     Chief Complaint: COVID-19 Concerns    CDC Testing and Quarantine Guidelines for Exposure:         A close exposure is defined as anyone who has had an exposure (masked or unmasked) to a known COVID -19 positive person within 6 ft for longer than 15 minutes. If your exposure meets this definition you are required by CDC guidelines to quarantine for at least 7-10 days from time of exposure. The CDC states that a test can be performed for an asymptomatic patient (someone who does not have any symptoms) after a close exposure, and that a test should be done if you develop symptoms after a close exposure as described above.  Specifically, you can test at day 5 or later if asymptomatic, in order to get released from quarantine on day 7 or later.  If you develop symptoms sooner, you should test when your symptoms start.  If you meet the definition of a close exposure, it will not matter whether you are experiencing symptoms- a quarantine for at least 7-10 days after a close exposure is required by CDC guidelines.  Please note, if you decide to test as an asymptomatic during your quarantine and you are positive, you will be restarting your quarantine and moving from a possible 10 day quarantine (if you do not test), to a 11 day or greater quarantine.  The CDC also suggests people still monitor for symptoms for a full 14 days and remember that the shorter quarantine options do not replace initial CDC guidance.  The CDC continues to recommend quarantining for 14 days as the best way to reduce risk for spreading COVID-19 - however, this is only a recommendation.  If your exposure does not meet the above definition, you can return to your normal daily activities to include social distancing, wearing a mask and frequent handwashing    ROS    Objective:      Physical Exam      Assessment:       1. Screening  for viral disease          Plan:         Screening for viral disease  -     POCT COVID-19 Rapid Screening

## 2021-11-27 ENCOUNTER — OFFICE VISIT (OUTPATIENT)
Dept: URGENT CARE | Facility: CLINIC | Age: 81
End: 2021-11-27
Payer: MEDICARE

## 2021-11-27 VITALS
HEART RATE: 70 BPM | TEMPERATURE: 99 F | HEIGHT: 65 IN | BODY MASS INDEX: 25.83 KG/M2 | WEIGHT: 155 LBS | OXYGEN SATURATION: 99 % | SYSTOLIC BLOOD PRESSURE: 185 MMHG | DIASTOLIC BLOOD PRESSURE: 99 MMHG | RESPIRATION RATE: 16 BRPM

## 2021-11-27 DIAGNOSIS — M25.511 ACUTE PAIN OF RIGHT SHOULDER: Primary | ICD-10-CM

## 2021-11-27 DIAGNOSIS — S46.911A STRAIN OF RIGHT SHOULDER, INITIAL ENCOUNTER: ICD-10-CM
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
## 2021-11-27 PROCEDURE — 73030 X-RAY EXAM OF SHOULDER: CPT | Mod: RT,S$GLB,, | Performed by: RADIOLOGY

## 2021-11-27 PROCEDURE — 99214 PR OFFICE/OUTPT VISIT, EST, LEVL IV, 30-39 MIN: ICD-10-PCS | Mod: S$GLB,,, | Performed by: PHYSICIAN ASSISTANT

## 2021-11-27 PROCEDURE — 73030 XR SHOULDER COMPLETE 2 OR MORE VIEWS RIGHT: ICD-10-PCS | Mod: RT,S$GLB,, | Performed by: RADIOLOGY

## 2021-11-27 PROCEDURE — 99214 OFFICE O/P EST MOD 30 MIN: CPT | Mod: S$GLB,,, | Performed by: PHYSICIAN ASSISTANT

## 2021-12-03 ENCOUNTER — TELEPHONE (OUTPATIENT)
Dept: ORTHOPEDICS | Facility: CLINIC | Age: 81
End: 2021-12-03
Payer: MEDICARE

## 2021-12-06 ENCOUNTER — OFFICE VISIT (OUTPATIENT)
Dept: SPORTS MEDICINE | Facility: CLINIC | Age: 81
End: 2021-12-06
Payer: MEDICARE

## 2021-12-06 ENCOUNTER — HOSPITAL ENCOUNTER (OUTPATIENT)
Dept: RADIOLOGY | Facility: HOSPITAL | Age: 81
Discharge: HOME | End: 2021-12-06
Attending: ORTHOPAEDIC SURGERY
Payer: MEDICARE

## 2021-12-06 VITALS
HEIGHT: 65 IN | DIASTOLIC BLOOD PRESSURE: 85 MMHG | WEIGHT: 136 LBS | SYSTOLIC BLOOD PRESSURE: 188 MMHG | BODY MASS INDEX: 22.66 KG/M2 | HEART RATE: 57 BPM | RESPIRATION RATE: 18 BRPM

## 2021-12-06 DIAGNOSIS — M25.511 ACUTE PAIN OF RIGHT SHOULDER: Primary | ICD-10-CM

## 2021-12-06 DIAGNOSIS — R29.898 SHOULDER WEAKNESS: ICD-10-CM

## 2021-12-06 DIAGNOSIS — M25.511 ACUTE PAIN OF RIGHT SHOULDER: ICD-10-CM

## 2021-12-06 PROCEDURE — 73030 X-RAY EXAM OF SHOULDER: CPT | Mod: TC,RT

## 2021-12-06 PROCEDURE — 73030 XR SHOULDER COMPLETE 2 OR MORE VIEWS RIGHT: ICD-10-PCS | Mod: 26,RT,, | Performed by: RADIOLOGY

## 2021-12-06 PROCEDURE — 99999 PR PBB SHADOW E&M-EST. PATIENT-LVL III: ICD-10-PCS | Mod: PBBFAC,,, | Performed by: ORTHOPAEDIC SURGERY

## 2021-12-06 PROCEDURE — 99999 PR PBB SHADOW E&M-EST. PATIENT-LVL III: CPT | Mod: PBBFAC,,, | Performed by: ORTHOPAEDIC SURGERY

## 2021-12-06 PROCEDURE — 73030 X-RAY EXAM OF SHOULDER: CPT | Mod: 26,RT,, | Performed by: RADIOLOGY

## 2021-12-06 PROCEDURE — 99203 OFFICE O/P NEW LOW 30 MIN: CPT | Mod: S$GLB,,, | Performed by: ORTHOPAEDIC SURGERY

## 2021-12-06 PROCEDURE — 99203 PR OFFICE/OUTPT VISIT, NEW, LEVL III, 30-44 MIN: ICD-10-PCS | Mod: S$GLB,,, | Performed by: ORTHOPAEDIC SURGERY

## 2021-12-06 PROCEDURE — 73221 MRI SHOULDER WITHOUT CONTRAST RIGHT: ICD-10-PCS | Mod: 26,RT,, | Performed by: RADIOLOGY

## 2021-12-06 PROCEDURE — 73221 MRI JOINT UPR EXTREM W/O DYE: CPT | Mod: 26,RT,, | Performed by: RADIOLOGY

## 2021-12-06 PROCEDURE — 73221 MRI JOINT UPR EXTREM W/O DYE: CPT | Mod: TC,RT

## 2021-12-14 ENCOUNTER — OFFICE VISIT (OUTPATIENT)
Dept: SPORTS MEDICINE | Facility: CLINIC | Age: 81
End: 2021-12-14
Payer: MEDICARE

## 2021-12-14 ENCOUNTER — TELEPHONE (OUTPATIENT)
Dept: SPORTS MEDICINE | Facility: CLINIC | Age: 81
End: 2021-12-14
Payer: MEDICARE

## 2021-12-14 VITALS
HEART RATE: 61 BPM | SYSTOLIC BLOOD PRESSURE: 160 MMHG | DIASTOLIC BLOOD PRESSURE: 80 MMHG | HEIGHT: 65 IN | BODY MASS INDEX: 22.66 KG/M2 | WEIGHT: 136 LBS

## 2021-12-14 DIAGNOSIS — M25.311 SHOULDER INSTABILITY, RIGHT: Primary | ICD-10-CM

## 2021-12-14 DIAGNOSIS — R29.898 SHOULDER WEAKNESS: ICD-10-CM

## 2021-12-14 PROCEDURE — 99213 PR OFFICE/OUTPT VISIT, EST, LEVL III, 20-29 MIN: ICD-10-PCS | Mod: S$GLB,,, | Performed by: ORTHOPAEDIC SURGERY

## 2021-12-14 PROCEDURE — 99213 OFFICE O/P EST LOW 20 MIN: CPT | Mod: S$GLB,,, | Performed by: ORTHOPAEDIC SURGERY

## 2021-12-14 PROCEDURE — 99999 PR PBB SHADOW E&M-EST. PATIENT-LVL III: CPT | Mod: PBBFAC,,, | Performed by: ORTHOPAEDIC SURGERY

## 2021-12-14 PROCEDURE — 99999 PR PBB SHADOW E&M-EST. PATIENT-LVL III: ICD-10-PCS | Mod: PBBFAC,,, | Performed by: ORTHOPAEDIC SURGERY

## 2022-01-05 ENCOUNTER — CLINICAL SUPPORT (OUTPATIENT)
Dept: REHABILITATION | Facility: HOSPITAL | Age: 82
End: 2022-01-05
Attending: ORTHOPAEDIC SURGERY
Payer: MEDICARE

## 2022-01-05 DIAGNOSIS — M25.311 SHOULDER INSTABILITY, RIGHT: ICD-10-CM

## 2022-01-05 DIAGNOSIS — R29.898 SHOULDER WEAKNESS: ICD-10-CM

## 2022-01-05 PROCEDURE — 97140 MANUAL THERAPY 1/> REGIONS: CPT | Mod: PO

## 2022-01-05 PROCEDURE — 97110 THERAPEUTIC EXERCISES: CPT | Mod: PO

## 2022-01-05 PROCEDURE — 97161 PT EVAL LOW COMPLEX 20 MIN: CPT | Mod: PO

## 2022-01-06 NOTE — PLAN OF CARE
[facility]  Physical Therapy Initial Evaluation    Name: Bao Mckeon  Clinic Number: [id number]    Therapy Diagnosis:   Encounter Diagnoses   Name Primary?    Shoulder weakness     Shoulder instability, right      Physician: RADHA Fox MD    Physician Orders: PT eval and treat  Medical Diagnosis:   R29.898 (ICD-10-CM) - Shoulder weakness   M25.311 (ICD-10-CM) - Shoulder instability, right       Evaluation Date: 1/5/22  Authorization Period Expiration: 12/14/22  Plan of Care Certification Period: 4/30/22  Progress Note Due: 2/5/22    Visit # / Visits authorized: 1/ 1   FOTO: 1/ 3   PTA Visit #: 0/5     Time In: 1145 am  Time Out: 1240 pm  Total Billable Time: 55 minutes, low complex, TE 1, MT 1    Precautions: hard of hearing, R shoulder dislocation and self relocation limit excessive extension of R shoulder due to instability.    Subjective   Date of onset: + 1 month ago recent exacerbation due to fall but chronic nature +1 year with previous injuries and falls    History of current condition - Bao reports: per orthopedic evaluation on 12/14/21:    CC: Right shoulder F/U      81 y.o. Male who presents for MRI review of right shoulder.  Concern for traumatic acute on chronic rotator cuff tear with associated anterior instability. History of anterior GH dislocation secondary to fall on the shoulder 11/27/21. Not taking any oral anti-inflammatory medication. Patient does not report any new incidents or injuries since their last appointment. Pain and symptoms have significantly improved since his last appointment. No further instability episodes and his pain has almost fully resolved. He has been wearing his sling but does report moving his shoulder some out of the sling without issue. Here today to discuss treatment options.     Per 12/6/21 ortho visit:    81 y.o. Male presents as a new patient to me. RHD. He  is retired.  Former professional musician.  Still plays the  rogelio.  He describes significant pain and subjective instability of the right shoulder which started acutely after a fall on the shoulder 11/27/21.  He presented to the emergency department on that date with x-rays which were reported to be negative.  Since then he has had several episodes of reported anterior instability with spontaneous or self reduction.  He does not trust the shoulder and has good deal of apprehension to motion.  No neck or radicular symptoms.  No numbness or tingling.  He denies any pre-existing problems.  Pain and symptoms are worse with overhead activity.  Better with rest.  Treatment has included sling immobilization, rest, activity modifications, and oral medication.  Lives alone.  Has family in town.    Ortho/SPM Exam  Examination of the right shoulder demonstrates ecchymosis over the upper arm and anterior chest consistent with recent trauma.  Generalized tenderness to palpation over the anterolateral shoulder.  Guarding present.  Active forward elevation the scapular plane to 80° with some limitation due to pain and apprehension.  3/5 resisted scaption.  4-out of 5 resisted ER at side.  Positive belly press test.  Perhaps mild Nacho deformity.  Motor and sensory intact to the right hand.  No midline neck tenderness.  Kyphotic posture.  Sensation intact to light touch over the axillary nerve distribution.       Medical History:   Past Medical History:   Diagnosis Date    Arthritis     Cataract     Glaucoma        Surgical History:   Bao Mckeon  has a past surgical history that includes Hernia repair and Cataract extraction w/  intraocular lens implant (Left, n/a).    Medications:   Bao has a current medication list which includes the following prescription(s): brimonidine 0.2%, ciprofloxacin hcl, dorzolamide, latanoprost, tamsulosin, and shingrix (pf).    Allergies:   Review of patient's allergies indicates:  No Known Allergies     Imaging, MRI studies: Narrative &  Impression  EXAMINATION:    MRI SHOULDER WITHOUT CONTRAST RIGHT     CLINICAL HISTORY:  Right shoulder pain;  Pain in right shoulder     TECHNIQUE:  MRI right shoulder performed without contrast per routine protocol.     COMPARISON:  12/06/2021     FINDINGS:  Rotator cuff: There is tendinosis and undersurface fraying of the supraspinatus tendon.  Infraspinatus, subscapularis, and teres minor tendons are intact.  Muscle bulk is maintained.     Labrum: There is a complex tear of the anterior to inferior labrum additionally, there is a tear of the superior labrum.     Biceps: Long head biceps tendinosis with likely partial tear at the anchor.     Bone: There is Hill-Sachs impaction fracture of the posterolateral humeral head with moderate bone marrow edema.  There is a fracture of the anterior inferior glenoid with displaced osseous fragment measuring approximately 2.7 x 0.5 cm.  Fracture of the coracoid process, likely nondisplaced.     Acromioclavicular joint: Mild arthrosis.     Cartilage: Aside from intra-articular glenoid fracture cartilage is grossly maintained.     Miscellaneous: Large joint effusion with synovitis.  Subacromial subdeltoid bursitis.     Impression:     1. Sequela of anterior shoulder dislocation.  Osseous Bankart injury with displaced bone fragment and complex tear of anterior to inferior labrum.  Hill-Sachs impaction fracture of posterolateral humeral head.  Likely nondisplaced fracture of coracoid process.  2. Tendinosis and undersurface fraying of supraspinatus tendon.  3. Biceps tendinosis with likely partial tear at the anchor.  4. Large joint effusion with synovitis.  Subacromial subdeltoid bursitis.        Electronically signed by: Nathaniel Porter MD  Date:                                            12/06/2021  Time:                                           13:25    EXAMINATION:  XR SHOULDER COMPLETE 2 OR MORE VIEWS RIGHT     CLINICAL HISTORY:  Pain in right  shoulder     FINDINGS:  Shoulder complete three views right: No fracture dislocation bone destruction seen.  There is mild DJD.  No trauma seen.        Electronically signed by: Sunil Odonnell MD  Date:                                            2021  Time:                                           08:59    Prior Therapy: yes  Social History:  lives alone 6 steps to enter his property  Occupation: musician/pianist plays locally 1x per week  Prior Level of Function: community ambulation and driving with recreational exercise  Current Level of Function: same but with pain    Pain:  Current 2/10, worst 9/10, best 0/10   Location: right shoulder   Description: Sharp  Aggravating Factors: Walking and Lifting  Easing Factors: relaxation and heating pad    Pts goals: to return to playing music/piano    Objective     Observation:/Posture Rounded shoulder, Head forward and Affected scapula elevated      Cervical ROM: (measured in degrees)    Degrees Quality   Flexion WFL    extension WFL         Right rotation 50 R shoulder pain   Left rotation 60      Shoulder Active/ Passive ROM: (measured in degrees)   Shoulder Right UE Left UE   Flexion  limited as follows: 110/120 WFLs 165   Abduction limited as follows: 70/90 WFLs 150    ER WFLs WFLs   IR WNLs WNLs     Sensation: Dermatomes: Intact to light touch    Reflexes: NT    Strength: (measured in neutral spine, gradin-5 out of 5)   Cervical MMT   Flexion 4+/5   Extension 4+/5   Right Side Bend 4+/5   Left Side Bend 4+/5   Upper trap. 4+/5     Upper Extremity Strength: (grading 1-5 out of 5)      Right UE Left UE   Shoulder Flexion: 4-/5 4+/5   Shoulder Abduction: 4-/5 4+/5   Shoulder ER: 4-/5 4+/5   Shoulder IR: 4/5 4+/5        Elbow Flexion: 4/5 4+/5   Elbow Extension: 4+/5 4+/5          Cervical Spine Special Tests: ((+): positive; (-): negative)   Compression neg   Distraction neg   Aly test/TOS NT   Lateral Flexion Alar Ligament intact   First Rib  Speeds  test  Empty can NT  NT  pos          CMS Impairment/Limitation/Restriction for FOTO shoulder Survey    Therapist reviewed FOTO scores for Bao Mckeon on 2022.   FOTO documents entered into [employer] - see Media section.    Limitation Score: TBD%  Category: TBD           TREATMENT   Treatment Time In: 1210 pm  Treatment Time Out: 1240 pm  Total Treatment time separate from Evaluation: 30 minutes    Bao received therapeutic exercises to develop strength, endurance, ROM and posture for 15 minutes includin set of each and limit to neutral towards extension to limit anterior shoulder     Scapular retraction 2 x 10 hold 3 secs  Cervical retraction 2 x 10 hold 3 secs  shld extension YTB 3 x 10  shld row YTB 3 x 10  Supine vs standing at wall scaption  3 x 10 attempted both position without issues  sidelying vs standing at wall abduction 3 x10  Pulley 2 x 1 min scaption NP  Pulley 2 x 1 min abduction NP    Levator stretch 3 x 30 secs NP    Bao received the following manual therapy techniques: Myofacial release and Soft tissue Mobilization were applied to the: levator, supraspinatus, teres minor, pectoralis minor, subclavius for 15 minutes, including:  Cervical traction    Bao received hot pack for 0 minutes to R shoulder neck.        Home Exercises and Patient Education Provided    Education provided:   - HEP, pain management    Written Home Exercises Provided: yes.  Exercises were reviewed and Bao was able to demonstrate them prior to the end of the session.  Bao demonstrated good  understanding of the education provided.     See EMR under per handout for exercises provided per handout 2022.    Assessment   Bao is a 81 y.o. male referred to outpatient Physical Therapy with a medical diagnosis of   R29.898 (ICD-10-CM) - Shoulder weakness   M25.311 (ICD-10-CM) - Shoulder instability, right     . Pt presents with right shoulder instability and apprehension with pain and limited AROM and  strength. PT to limit AROM into extension and progressively work into extension as we continue progressively increased anterior stabilization and further complicated by the rotator cuff impingement and biceps tendonitis. Pt with good insight and good relief with session and will progress as tolerated for increased strength, ROM and stabilization.    Pt prognosis is Good.   Pt will benefit from skilled outpatient Physical Therapy to address the deficits stated above and in the chart below, provide pt/family education, and to maximize pt's level of independence.     Plan of care discussed with patient: Yes  Pt's spiritual, cultural and educational needs considered and patient is agreeable to the plan of care and goals as stated below:     Anticipated Barriers for therapy: anterior instability of R shoulder and pain/apprehension    Medical Necessity is demonstrated by the following  History  Co-morbidities and personal factors that may impact the plan of care Co-morbidities:   per PMHx    Personal Factors:   age     low   Examination  Body Structures and Functions, activity limitations and participation restrictions that may impact the plan of care Body Regions:   neck  upper extremities    Body Systems:    gross symmetry  ROM  strength  gross coordinated movement    Participation Restrictions:   none    Activity limitations:   Learning and applying knowledge  no deficits    General Tasks and Commands  no deficits    Communication  hard of hearing    Mobility  lifting and carrying objects    Self care  washing oneself (bathing, drying, washing hands)  caring for body parts (brushing teeth, shaving, grooming)  dressing    Domestic Life  shopping  doing house work (cleaning house, washing dishes, laundry)    Interactions/Relationships  no deficits    Life Areas  employment    Community and Social Life  community life  recreation and leisure         low   Clinical Presentation stable and uncomplicated low   Decision  Making/ Complexity Score: low     Goals:  Short Term Goals: 3 weeks   1. Pt to be Independent with HEP for increased strength, endurance and functional mobility.  2. Pt to have decreased pain 0/10 after session for increased functional mobility and tolerance.  3. Pt to increase AROM to 150 degrees R shoulder flexion for increased functional mobility.      Long Term Goals: 10 weeks   1. Pt to be Independent with pain management strategies and verbalize 2 for increased strength, endurance and functional mobility.  2. Pt to have decreased pain 0/10 over 50% of the day for increased functional mobility and tolerance.  3. Pt to increase AROM to 150 degrees abduction of R shoulder for increased functional mobility.  4. Pt to increase activity tolerance to 1 hrs house cleaning or cooking for without increased pain for increased overall functional activity.  5. Pt to increased R shoulder strength into flexion and abduction through available AROM to 4/5.  6. Pt to tolerate playing his piano 1 hr total with 2 session of 30 minutes without pain or compensation to return to premorbid level.    Plan   Plan of care Certification: 1/5/2022 to 4/30/22.    Outpatient Physical Therapy 2 times weekly for 10 weeks to include the following interventions: Cervical/Lumbar Traction, Electrical Stimulation kinesiotape, Manual Therapy, Moist Heat/ Ice, Neuromuscular Re-ed, Patient Education and Therapeutic Exercise.     Noemy Loaiza, PT

## 2022-01-13 ENCOUNTER — CLINICAL SUPPORT (OUTPATIENT)
Dept: REHABILITATION | Facility: HOSPITAL | Age: 82
End: 2022-01-13
Attending: ORTHOPAEDIC SURGERY
Payer: MEDICARE

## 2022-01-13 DIAGNOSIS — R29.898 SHOULDER WEAKNESS: ICD-10-CM

## 2022-01-13 DIAGNOSIS — M25.311 SHOULDER INSTABILITY, RIGHT: ICD-10-CM

## 2022-01-13 PROCEDURE — 97140 MANUAL THERAPY 1/> REGIONS: CPT | Mod: PO

## 2022-01-13 PROCEDURE — 97110 THERAPEUTIC EXERCISES: CPT | Mod: PO

## 2022-01-13 NOTE — PROGRESS NOTES
[facility]   Physical Therapy Treatment Note     Name: Bao Mckeon  Clinic Number: [id number]    Therapy Diagnosis:   Encounter Diagnoses   Name Primary?    Shoulder instability, right     Shoulder weakness      Physician: RADHA Fox MD    Visit Date: 2022    Physician Orders: PT eval and treat  Medical Diagnosis:   R29.898 (ICD-10-CM) - Shoulder weakness   M25.311 (ICD-10-CM) - Shoulder instability, right         Evaluation Date: 22  Authorization Period Expiration: 22  Plan of Care Certification Period: 22  Progress Note Due: 22    Visit # / Visits authorized:    FOTO: 1/ 3   PTA Visit #: 0/5      Time In: 255 pm  Time Out: 335 pm  Total Billable Time: 40 minutes,, TE 2, MT 1     Precautions: hard of hearing, R shoulder dislocation and self relocation limit excessive extension of R shoulder due to instability.          SUBJECTIVE     Pt reports: feel the exercise has been helping but not wearing the splint they gave me and its more aching in the morning when I wake.  He was compliant with home exercise program.  Response to previous treatment: good pain relief and ease of motion  Functional change: ease with ADLs    Pain: 0/10  Location: right shoulder      OBJECTIVE     Objective Measures updated at progress report unless specified.       TREATMENT     Total Treatment time (time-based codes) separate from Evaluation: 40 minutes     Bao received the treatments listed below:      Bao received therapeutic exercises to develop strength, endurance, ROM and posture for 30 minutes includin set of each and limit to neutral towards extension to limit anterior shoulder      Scapular retraction 2 x 10 hold 3 secs  Cervical retraction 2 x 10 hold 3 secs  shld extension YTB 3 x 10  shld row YTB 3 x 10  shld ER YTB unilateral reciprocal 3 x 10  + chest press 2 x 10 with 3# dowel    Supine vs standing at wall scaption  3 x 10 attempted both  position without issues  sidelying vs standing at wall abduction 3 x10  Pulley 2 x 1 min scaption NP  Pulley 2 x 1 min abduction NP     Levator stretch 3 x 30 secs NP     Bao received the following manual therapy techniques: Myofacial release and Soft tissue Mobilization were applied to the: levator, supraspinatus, teres minor, pectoralis minor, subclavius for 10 minutes, including:  Cervical traction     Bao received hot pack for 0 minutes to R shoulder neck.      PATIENT EDUCATION AND HOME EXERCISES     Home Exercises Provided and Patient Education Provided     Education provided:   - HEP, pain management     Written Home Exercises Provided: yes.  Exercises were reviewed and Bao was able to demonstrate them prior to the end of the session.  Bao demonstrated good  understanding of the education provided.      See EMR under per handout for exercises provided per handout 1/5/2022.    ASSESSMENT     Pt needed cues for increase tension with use theraband and decreased ROM to maintain tension. Pt with good AROM and exercise to the neutral extension and to 5-10 deg extension to avoid excess tension or pain.    Bao Is progressing well towards his goals.   Pt prognosis is Good.     Pt will continue to benefit from skilled outpatient physical therapy to address the deficits listed in the problem list box on initial evaluation, provide pt/family education and to maximize pt's level of independence in the home and community environment.     Pt's spiritual, cultural and educational needs considered and pt agreeable to plan of care and goals.     Anticipated barriers to physical therapy: R shoulder instability due to dislocation    Goals:   Short Term Goals: 3 weeks   1. Pt to be Independent with HEP for increased strength, endurance and functional mobility. Progressing 1/13/22  2. Pt to have decreased pain 0/10 after session for increased functional mobility and tolerance. Progressing 1/13/22  3. Pt to  increase AROM to 150 degrees R shoulder flexion for increased functional mobility. Progressing 1/13/22        Long Term Goals: 10 weeks   1. Pt to be Independent with pain management strategies and verbalize 2 for increased strength, endurance and functional mobility.  2. Pt to have decreased pain 0/10 over 50% of the day for increased functional mobility and tolerance.  3. Pt to increase AROM to 150 degrees abduction of R shoulder for increased functional mobility.  4. Pt to increase activity tolerance to 1 hrs house cleaning or cooking for without increased pain for increased overall functional activity.  5. Pt to increased R shoulder strength into flexion and abduction through available AROM to 4/5.  6. Pt to tolerate playing his piano 1 hr total with 2 session of 30 minutes without pain or compensation to return to premorbid level.      PLAN     Cont with CORINA Loaiza, PT

## 2022-01-18 ENCOUNTER — CLINICAL SUPPORT (OUTPATIENT)
Dept: REHABILITATION | Facility: HOSPITAL | Age: 82
End: 2022-01-18
Attending: ORTHOPAEDIC SURGERY
Payer: MEDICARE

## 2022-01-18 DIAGNOSIS — M25.311 SHOULDER INSTABILITY, RIGHT: ICD-10-CM

## 2022-01-18 DIAGNOSIS — R29.898 SHOULDER WEAKNESS: ICD-10-CM

## 2022-01-18 PROCEDURE — 97110 THERAPEUTIC EXERCISES: CPT | Mod: PO

## 2022-01-18 PROCEDURE — 97140 MANUAL THERAPY 1/> REGIONS: CPT | Mod: PO

## 2022-01-18 NOTE — PROGRESS NOTES
[facility]   Physical Therapy Treatment Note     Name: Bao Mckeon  Clinic Number: [id number]    Therapy Diagnosis:   Encounter Diagnoses   Name Primary?    Shoulder instability, right     Shoulder weakness      Physician: RADHA Fox MD    Visit Date: 2022    Physician Orders: PT eval and treat  Medical Diagnosis:   R29.898 (ICD-10-CM) - Shoulder weakness   M25.311 (ICD-10-CM) - Shoulder instability, right         Evaluation Date: 22  Authorization Period Expiration: 22  Plan of Care Certification Period: 22  Progress Note Due: 22    Visit # / Visits authorized:    FOTO: 1/ 3   PTA Visit #: 0/5      Time In: 1109 am  Time Out: 1149 am  Total Billable Time: 40 minutes,, TE 2, MT 1     Precautions: hard of hearing, R shoulder dislocation and self relocation limit excessive extension of R shoulder due to instability.          SUBJECTIVE     Pt reports: felt more pain for the last 5 days so I just came in today. I haven't exercise since last visit and have used an arm sling and ice so I don't know why its hurting more    He was compliant with home exercise program.  Response to previous treatment: good increased pain  and decreased ease of motion  Functional change: less exercise and activity this week.    Pain: 4/10 pre and 3/10 post session.  Location: right shoulder      OBJECTIVE     Objective Measures updated at progress report unless specified.       TREATMENT     Total Treatment time (time-based codes) separate from Evaluation: 40 minutes     Bao received the treatments listed below:      Bao received therapeutic exercises to develop strength, endurance, ROM and posture for 24 minutes includin-2 set of each due to time  and limit to neutral towards extension to limit anterior shoulder stress     Scapular retraction 2 x 10 hold 3 secs  Cervical retraction 2 x 10 hold 3 secs  shld extension YTB 3 x 10  shld row YTB 3 x 10  shld ER YTB  unilateral reciprocal 3 x 10  + chest press 2 x 10 with 3# dowel next visit  + scapular stabilization at mat and with HOB elevated CW and CCW 2 x 10 or to tolerance.     Supine vs standing at wall scaption  3 x 10 attempted both position without issues  sidelying vs standing at wall abduction 3 x10 NP  Criss 2 x 1 min scaption NP  Pulley 2 x 1 min abduction NP     Levator stretch 3 x 30 secs NP     Bao received the following manual therapy techniques: Myofacial release and Soft tissue Mobilization were applied to the: levator, supraspinatus, teres minor, pectoralis minor, subclavius for 16 minutes, including:  Cervical traction     Bao received hot pack for 10 minutes to R shoulder neck after session.      PATIENT EDUCATION AND HOME EXERCISES     Home Exercises Provided and Patient Education Provided     Education provided:   - HEP, pain management     Written Home Exercises Provided: yes.  Exercises were reviewed and Bao was able to demonstrate them prior to the end of the session.  Bao demonstrated good  understanding of the education provided.      See EMR under per handout for exercises provided per handout 1/5/2022.    ASSESSMENT     Pt with hearing aid fixed but still has trouble discriminating from background noise. Pt moved to private room. Pt with decreased insight had concerns the HEP led to new pain. Pt started use of RUE to play piano last week, changes in weather, sleeping position, and overuse of the sling could have all been factor to leading to increased pectoralis minor tightness and protracted and IR shoulder positioning. Pt education with POC and need for continue strengthening and scapular stabilization to increase shoulder stability. Pt demonstrated greater loading and strain through his shoulder with positioning and use of RUE for shoulder depression and moving himself up the mat to HOB while sitting and use of the arm to push open front door to the clinic without pain.  Pt was  guarding and apprehensive on arrival and reported limited use and compliance this week.  PT completed HEP program in session with limited sets and patient had no complaints of increased pain with exercise and needed cues for increased scapular retraction in session and education with self monitor of resistance by stepping forward or backward to increased and decrease tension of theraband.    Bao Is progressing well towards his goals.   Pt prognosis is Good.     Pt will continue to benefit from skilled outpatient physical therapy to address the deficits listed in the problem list box on initial evaluation, provide pt/family education and to maximize pt's level of independence in the home and community environment.     Pt's spiritual, cultural and educational needs considered and pt agreeable to plan of care and goals.     Anticipated barriers to physical therapy: R shoulder instability due to dislocation    Goals:   Short Term Goals: 3 weeks   1. Pt to be Independent with HEP for increased strength, endurance and functional mobility. Progressing 1/13/22  2. Pt to have decreased pain 0/10 after session for increased functional mobility and tolerance. Progressing 1/18/22  3. Pt to increase AROM to 150 degrees R shoulder flexion for increased functional mobility. Progressing 1/13/22        Long Term Goals: 10 weeks   1. Pt to be Independent with pain management strategies and verbalize 2 for increased strength, endurance and functional mobility.  2. Pt to have decreased pain 0/10 over 50% of the day for increased functional mobility and tolerance.  3. Pt to increase AROM to 150 degrees abduction of R shoulder for increased functional mobility.  4. Pt to increase activity tolerance to 1 hrs house cleaning or cooking for without increased pain for increased overall functional activity.  5. Pt to increased R shoulder strength into flexion and abduction through available AROM to 4/5.  6. Pt to tolerate playing his piano  1 hr total with 2 session of 30 minutes without pain or compensation to return to premorbid level.      PLAN     Cont with POC    Noemy Loaiza, PT

## 2022-01-26 ENCOUNTER — CLINICAL SUPPORT (OUTPATIENT)
Dept: REHABILITATION | Facility: HOSPITAL | Age: 82
End: 2022-01-26
Attending: ORTHOPAEDIC SURGERY
Payer: MEDICARE

## 2022-01-26 DIAGNOSIS — R29.898 SHOULDER WEAKNESS: ICD-10-CM

## 2022-01-26 DIAGNOSIS — M25.311 SHOULDER INSTABILITY, RIGHT: ICD-10-CM

## 2022-01-26 PROCEDURE — 97530 THERAPEUTIC ACTIVITIES: CPT | Mod: PO

## 2022-01-26 PROCEDURE — 97110 THERAPEUTIC EXERCISES: CPT | Mod: PO

## 2022-01-26 NOTE — PROGRESS NOTES
[facility]   Physical Therapy Treatment Note     Name: Bao Mckeon  Clinic Number: [id number]    Therapy Diagnosis:   Encounter Diagnoses   Name Primary?    Shoulder instability, right     Shoulder weakness      Physician: RADHA Fox MD    Visit Date: 2022    Physician Orders: PT eval and treat  Medical Diagnosis:   R29.898 (ICD-10-CM) - Shoulder weakness   M25.311 (ICD-10-CM) - Shoulder instability, right         Evaluation Date: 22  Authorization Period Expiration: 22  Plan of Care Certification Period: 22  Progress Note Due: 22    Visit # / Visits authorized:    FOTO: 1/ 3   PTA Visit #: 0/5      Time In: 8:20 am  Time Out: 8:45 am  Total Billable Time: 40 minutes,, TE 2, MT 1     Precautions: hard of hearing, R shoulder dislocation and self relocation limit excessive extension of R shoulder due to instability.          SUBJECTIVE     Pt reports: that he woke up having slept on his R side.  He used some warm compresses this morning.  He also stated that he is able to hear better today as he has his hearing aids in and charged.    He was compliant with home exercise program.  Response to previous treatment:   Functional change: less exercise and activity this week.    Pain: 0/10 pre and 0/10 post session.  Location: right shoulder      OBJECTIVE     Objective Measures updated at progress report unless specified.       TREATMENT     Total Treatment time (time-based codes) separate from Evaluation: 40 minutes     Bao received the treatments listed below:      Bao received therapeutic exercises to develop strength, endurance, ROM and posture for 30 minutes includin-2 set of each due to time  and limit to neutral towards extension to limit anterior shoulder stress     Scapular retraction 2 x 10 hold 3 secs  Cervical retraction 2 x 10 hold 3 secs  B shld extension OrangeTB 3 x 10  B shld row Orange TB 3 x 10  L shoulder adduction 10 x 3  with [place] TB - pt instructed in a new exercise  L shoulder flexion with elbow flexed 10 x 3 with orange TB - pt instructed in a new exercise  shld ER YTB unilateral reciprocal 3 x 10   chest press 2 x 10 with 3# dowel with AA R shoulder flexion - pt instructed in a modification of a previous exercise.  + scapular stabilization at mat and with HOB elevated CW and CCW 2 x 10 or to tolerance.     Supine vs standing at wall scaption  3 x 10 attempted both position without issues  sidelying vs standing at wall abduction 3 x10 NP  Criss 2 x 1 min scaption NP  Pulley 2 x 1 min abduction NP     Levator stretch 3 x 30 secs NP     Bao received the following manual therapy techniques: Myofacial release and Soft tissue Mobilization were applied to the: levator, supraspinatus, teres minor, pectoralis minor, subclavius for 10 minutes, including:  Cervical traction  Manually resisted R shoulder internal and external rotation 10 x 3 mid range   Bao received hot pack for 10 minutes to R shoulder neck after session.      PATIENT EDUCATION AND HOME EXERCISES     Home Exercises Provided and Patient Education Provided     Education provided:   - HEP, pain management     Written Home Exercises Provided: yes.  Exercises were reviewed and Bao was able to demonstrate them prior to the end of the session.  Bao demonstrated good  understanding of the education provided.      See EMR under per handout for exercises provided per handout 1/5/2022.    ASSESSMENT     Pt with hearing aid fixed and states that he will be able to hear out in the gym today.  Pt remains limited in R shoulder elevation.  He was able to perform supine AA R shoulder flexion with a 3# dowel.  He reported some discomfort in the R shoulder on the last 3 repetitions of the last set. He was also able to tolerate standing R shoulder adduction and flexion with an orange TB.   Bao Is progressing well towards his goals.   Pt prognosis is Good.     Pt will  continue to benefit from skilled outpatient physical therapy to address the deficits listed in the problem list box on initial evaluation, provide pt/family education and to maximize pt's level of independence in the home and community environment.     Pt's spiritual, cultural and educational needs considered and pt agreeable to plan of care and goals.     Anticipated barriers to physical therapy: R shoulder instability due to dislocation    Goals:   Short Term Goals: 3 weeks   1. Pt to be Independent with HEP for increased strength, endurance and functional mobility. Progressing 1/13/22  2. Pt to have decreased pain 0/10 after session for increased functional mobility and tolerance. Progressing 1/18/22  3. Pt to increase AROM to 150 degrees R shoulder flexion for increased functional mobility. Progressing 1/13/22        Long Term Goals: 10 weeks   1. Pt to be Independent with pain management strategies and verbalize 2 for increased strength, endurance and functional mobility.  2. Pt to have decreased pain 0/10 over 50% of the day for increased functional mobility and tolerance.  3. Pt to increase AROM to 150 degrees abduction of R shoulder for increased functional mobility.  4. Pt to increase activity tolerance to 1 hrs house cleaning or cooking for without increased pain for increased overall functional activity.  5. Pt to increased R shoulder strength into flexion and abduction through available AROM to 4/5.  6. Pt to tolerate playing his piano 1 hr total with 2 session of 30 minutes without pain or compensation to return to premorbid level.      PLAN     Cont with CORINA Vasquez, PT

## 2022-01-28 ENCOUNTER — CLINICAL SUPPORT (OUTPATIENT)
Dept: REHABILITATION | Facility: HOSPITAL | Age: 82
End: 2022-01-28
Attending: ORTHOPAEDIC SURGERY
Payer: MEDICARE

## 2022-01-28 DIAGNOSIS — M25.311 SHOULDER INSTABILITY, RIGHT: ICD-10-CM

## 2022-01-28 DIAGNOSIS — R29.898 SHOULDER WEAKNESS: ICD-10-CM

## 2022-01-28 PROCEDURE — 97140 MANUAL THERAPY 1/> REGIONS: CPT | Mod: PO,CQ

## 2022-01-28 PROCEDURE — 97110 THERAPEUTIC EXERCISES: CPT | Mod: PO,CQ

## 2022-01-28 NOTE — PROGRESS NOTES
"[facility]   Physical Therapy Treatment Note     Name: Bao Mckeon  Clinic Number: [id number]    Therapy Diagnosis:   Encounter Diagnoses   Name Primary?    Shoulder instability, right     Shoulder weakness      Physician: RADHA Fox MD    Visit Date: 1/28/2022    Physician Orders: PT eval and treat  Medical Diagnosis:   R29.898 (ICD-10-CM) - Shoulder weakness   M25.311 (ICD-10-CM) - Shoulder instability, right         Evaluation Date: 1/5/22  Authorization Period Expiration: 12/14/22  Plan of Care Certification Period: 4/30/22  Progress Note Due: 2/5/22    Visit # / Visits authorized: 2 / 20   FOTO: 1/ 3   PTA Visit #: 1 / 5      Time In: 8:15 am  Time Out: 08:55 am  Total Billable Time: 40 minutes,, TE 2, MT 1     Precautions: Hard of hearing, R shoulder dislocation and self relocation limit excessive extension of R shoulder due to instability.          SUBJECTIVE     Pt reports: " It's like a little ache, not really pain"    He was compliant with home exercise program.  Response to previous treatment:   Functional change: less exercise and activity this week.    Pain: 0/10 pre and 0/10 post session.  Location: right shoulder      OBJECTIVE     Objective Measures updated at progress report unless specified.       TREATMENT     Total Treatment time (time-based codes) separate from Evaluation: 40 minutes     Bao received the treatments listed below:      Bao received therapeutic exercises to develop strength, endurance, ROM and posture for 30 minutes including:     Scapular retraction 2 x 10 hold 3 seconds  Cervical retraction 2 x 10 hold 3 secs  B shld extension OrangeTB 3 x 10  B shld row Orange TB 3 x 10  L shoulder adduction 10 x 3 with Orange TB  L shoulder flexion with elbow flexed 10 x 3 with orange TB  Shld ER YTB unilateral reciprocal 3 x 10   Chest press 2 x 10 with 3# dowel with AA R shoulder flexion  + scapular stabilization at mat and with HOB elevated CW " and CCW 2 x 10 or to tolerance.     Supine vs standing at wall scaption  3 x 10 attempted both position without issues  sidelying vs standing at wall abduction 3 x10 NP  Criss 2 x 1 min scaption NP  Pulley 2 x 1 min abduction NP     Levator stretch 3 x 30 secs NP     Bao received the following manual therapy techniques: Myofacial release and Soft tissue Mobilization were applied to the: levator, supraspinatus, teres minor, pectoralis minor, subclavius for 10 minutes, including:    Cervical traction  Manually resisted R shoulder internal and external rotation 10 x 3 mid range   Bao received hot pack for 10 minutes to R shoulder neck after session.      PATIENT EDUCATION AND HOME EXERCISES     Home Exercises Provided and Patient Education Provided     Education provided:   - HEP, pain management     Written Home Exercises Provided: yes.  Exercises were reviewed and Bao was able to demonstrate them prior to the end of the session.  Bao demonstrated good  understanding of the education provided.      See EMR under per handout for exercises provided per handout 1/5/2022.    ASSESSMENT     Patient tolerated therapy session well, no increase in pain or adverse symptoms reports. Required cueing for proper form / technique with therex.   Bao Is progressing well towards his goals.   Pt prognosis is Good.     Pt will continue to benefit from skilled outpatient physical therapy to address the deficits listed in the problem list box on initial evaluation, provide pt/family education and to maximize pt's level of independence in the home and community environment.     Pt's spiritual, cultural and educational needs considered and pt agreeable to plan of care and goals.     Anticipated barriers to physical therapy: R shoulder instability due to dislocation    Goals:   Short Term Goals: 3 weeks   1. Pt to be Independent with HEP for increased strength, endurance and functional mobility. Progressing  1/13/22  2. Pt to have decreased pain 0/10 after session for increased functional mobility and tolerance. Progressing 1/18/22  3. Pt to increase AROM to 150 degrees R shoulder flexion for increased functional mobility. Progressing 1/13/22        Long Term Goals: 10 weeks   1. Pt to be Independent with pain management strategies and verbalize 2 for increased strength, endurance and functional mobility.  2. Pt to have decreased pain 0/10 over 50% of the day for increased functional mobility and tolerance.  3. Pt to increase AROM to 150 degrees abduction of R shoulder for increased functional mobility.  4. Pt to increase activity tolerance to 1 hrs house cleaning or cooking for without increased pain for increased overall functional activity.  5. Pt to increased R shoulder strength into flexion and abduction through available AROM to 4/5.  6. Pt to tolerate playing his piano 1 hr total with 2 session of 30 minutes without pain or compensation to return to premorbid level.      PLAN     Cont with OCRINA Antunez, PTA

## 2022-02-01 ENCOUNTER — CLINICAL SUPPORT (OUTPATIENT)
Dept: REHABILITATION | Facility: HOSPITAL | Age: 82
End: 2022-02-01
Payer: MEDICARE

## 2022-02-01 DIAGNOSIS — R29.898 SHOULDER WEAKNESS: ICD-10-CM

## 2022-02-01 DIAGNOSIS — M25.311 SHOULDER INSTABILITY, RIGHT: ICD-10-CM

## 2022-02-01 PROCEDURE — 97110 THERAPEUTIC EXERCISES: CPT | Mod: PO

## 2022-02-01 PROCEDURE — 97140 MANUAL THERAPY 1/> REGIONS: CPT | Mod: PO

## 2022-02-01 NOTE — PROGRESS NOTES
"[facility]   Physical Therapy Treatment Note     Name: Bao Mckeon  Clinic Number: [id number]    Therapy Diagnosis:   Encounter Diagnoses   Name Primary?    Shoulder instability, right     Shoulder weakness      Physician: RADHA Fox MD    Visit Date: 2/1/2022    Physician Orders: PT eval and treat  Medical Diagnosis:   R29.898 (ICD-10-CM) - Shoulder weakness   M25.311 (ICD-10-CM) - Shoulder instability, right         Evaluation Date: 1/5/22  Authorization Period Expiration: 12/14/22  Plan of Care Certification Period: 4/30/22  Progress Note Due: 2/5/22    Visit # / Visits authorized: 2 / 20   FOTO: 1/ 3   PTA Visit #: 1 / 5      Time In: 917 am  Time Out: 1005 am  Total Billable Time: 48 minutes,, TE 2, MT 1     Precautions: Hard of hearing, R shoulder dislocation and self relocation limit excessive extension of R shoulder due to instability.          SUBJECTIVE     Pt reports: " It's like a little ache, not really pain"    He was compliant with home exercise program.  Response to previous treatment:   Functional change: less exercise and activity this week.    Pain: 0/10 pre and 0/10 post session.  Location: right shoulder      OBJECTIVE     Objective Measures updated at progress report unless specified.       TREATMENT     Total Treatment time (time-based codes) separate from Evaluation: 40 minutes     Bao received the treatments listed below:      Bao received therapeutic exercises to develop strength, endurance, ROM and posture for 38 minutes including:     Scapular retraction 2 x 10 hold 3 seconds  Cervical retraction 2 x 10 hold 3 secs  B shld extension OrangeTB 3 x 10  B shld row Orange TB 3 x 10  L shoulder adduction 10 x 3 with [place] TB  L shoulder flexion with elbow flexed 10 x 3 with orange TB  +shoulder abduction to tolerance with and without 1# weight pain free zone 2 x 5-10  +shoulder flexion to tolerance with and without 1# weight pain free zone 2 x " 5-10    Shld ER YTB unilateral reciprocal 3 x 10   Chest press 2 x 10 with 3# dowel with AA R shoulder flexion  + scapular stabilization in standing CW and CCW 2 x 10 or to tolerance.     Supine vs standing at wall scaption  3 x 10 attempted both position without issues  sidelying vs standing at wall abduction 3 x10 NP  Criss 2 x 1 min scaption NP  Pulley 2 x 1 min abduction NP     Levator stretch 3 x 30 secs NP     Bao received the following manual therapy techniques: Myofacial release and Soft tissue Mobilization were applied to the: levator, supraspinatus, teres minor, pectoralis minor, subclavius for 10 minutes, including:    Cervical traction  Manually resisted R shoulder internal and external rotation 10 x 3 mid range     Bao received hot pack for 0 minutes to R shoulder neck after session.      PATIENT EDUCATION AND HOME EXERCISES     Home Exercises Provided and Patient Education Provided     Education provided:   - HEP, pain management     Written Home Exercises Provided: yes.  Exercises were reviewed and Bao was able to demonstrate them prior to the end of the session.  Bao demonstrated good  understanding of the education provided.      See EMR under per handout for exercises provided per handout 1/5/2022.    ASSESSMENT     Patient education with management of resistance with theraband by coming closer or stepping away and maintaining scapular retraction. Pt education with new exercises with handout and to perform to tolerance. Pt education with progressing with extension of shoulder to tolerance in pain free range and able to exercise into 15-20 deg of shoulder extension without pain.      Bao Is progressing well towards his goals.   Pt prognosis is Good.     Pt will continue to benefit from skilled outpatient physical therapy to address the deficits listed in the problem list box on initial evaluation, provide pt/family education and to maximize pt's level of independence in the home  and community environment.     Pt's spiritual, cultural and educational needs considered and pt agreeable to plan of care and goals.     Anticipated barriers to physical therapy: R shoulder instability due to dislocation    Goals:   Short Term Goals: 3 weeks   1. Pt to be Independent with HEP for increased strength, endurance and functional mobility. Progressing 2/1/22  2. Pt to have decreased pain 0/10 after session for increased functional mobility and tolerance. MET 2/1 /22  3. Pt to increase AROM to 150 degrees R shoulder flexion for increased functional mobility. Progressing 2/1/22        Long Term Goals: 10 weeks   1. Pt to be Independent with pain management strategies and verbalize 2 for increased strength, endurance and functional mobility.  2. Pt to have decreased pain 0/10 over 50% of the day for increased functional mobility and tolerance.  3. Pt to increase AROM to 150 degrees abduction of R shoulder for increased functional mobility.  4. Pt to increase activity tolerance to 1 hrs house cleaning or cooking for without increased pain for increased overall functional activity.  5. Pt to increased R shoulder strength into flexion and abduction through available AROM to 4/5.  6. Pt to tolerate playing his piano 1 hr total with 2 session of 30 minutes without pain or compensation to return to premorbid level.      PLAN     Cont with CORINA Loaiza, PT

## 2022-02-03 ENCOUNTER — CLINICAL SUPPORT (OUTPATIENT)
Dept: REHABILITATION | Facility: HOSPITAL | Age: 82
End: 2022-02-03
Payer: MEDICARE

## 2022-02-03 DIAGNOSIS — R29.898 SHOULDER WEAKNESS: ICD-10-CM

## 2022-02-03 DIAGNOSIS — M25.311 SHOULDER INSTABILITY, RIGHT: ICD-10-CM

## 2022-02-03 PROCEDURE — 97110 THERAPEUTIC EXERCISES: CPT | Mod: PO

## 2022-02-03 NOTE — PROGRESS NOTES
[facility]   Physical Therapy Treatment Note     Name: Bao Mckeon  Clinic Number: [id number]    Therapy Diagnosis:   Encounter Diagnoses   Name Primary?    Shoulder instability, right     Shoulder weakness      Physician: RADHA Fox MD    Visit Date: 2/3/2022    Physician Orders: PT eval and treat  Medical Diagnosis:   R29.898 (ICD-10-CM) - Shoulder weakness   M25.311 (ICD-10-CM) - Shoulder instability, right         Evaluation Date: 1/5/22  Authorization Period Expiration: 12/14/22  Plan of Care Certification Period: 4/30/22  Progress Note Due: 2/5/22    Visit # / Visits authorized: 2 / 20   FOTO: 1/ 3   PTA Visit #: 1 / 5      Time In: 916 am  Time Out: 1000 am  Total Billable Time: 44 minutes,, TE 3     Precautions: Hard of hearing, R shoulder dislocation and self relocation limit excessive extension of R shoulder due to instability.          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    He was compliant with home exercise program.  Response to previous treatment:   Functional change: less exercise and activity this week.    Pain: 0/10 pre and 0/10 post session.  Location: right shoulder      OBJECTIVE     Objective Measures updated at progress report unless specified.       TREATMENT     Total Treatment time (time-based codes) separate from Evaluation: 44 minutes     Bao received the treatments listed below:      Bao received therapeutic exercises to develop strength, endurance, ROM and posture for 40 minutes including:     Chest press with PVC in supine with 30 deg HOB elevation 2 x 10  Same position as chest press into in pain free range ( today ) of flexionScapular retraction 2 x 10 hold 3 seconds  Cervical retraction 2 x 10 hold 3 secs  B shld extension OrangeTB 3 x 10  B shld row Orange TB 3 x 10  L shoulder adduction 10 x 3 with Orange TB  L shoulder flexion with elbow  flexed 10 x 3 with orange TB  +shoulder abduction to tolerance with and without 1# weight pain free zone 2 x 5-10  +shoulder flexion to tolerance with and without 1# weight pain free zone 2 x 5-10    Shld ER YTB unilateral reciprocal 3 x 10   + scapular stabilization in standing CW and CCW 2 x 10 or to tolerance.     Supine vs standing at wall scaption  3 x 10 attempted both position without issues  sidelying vs standing at wall abduction 3 x10 NP  Criss 2 x 1 min scaption NP  Pulley 2 x 1 min abduction NP     Levator stretch 3 x 30 secs NP     Bao received the following manual therapy techniques: Myofacial release and Soft tissue Mobilization were applied to the: levator, supraspinatus, teres minor, pectoralis minor, subclavius for 4 minutes, including:    Cervical traction  Manually resisted R shoulder internal and external rotation 10 x 3 mid range     Bao received hot pack for 15 minutes to R shoulder neck after session.      PATIENT EDUCATION AND HOME EXERCISES     Home Exercises Provided and Patient Education Provided     Education provided:   - HEP, pain management     Written Home Exercises Provided: yes.  Exercises were reviewed and Bao was able to demonstrate them prior to the end of the session.  Bao demonstrated good  understanding of the education provided.      See EMR under per handout for exercises provided per handout 1/5/2022.    ASSESSMENT     Patient without issues overall just tightness and pain for prolonged use of the computre but self resolved without loss of motion. Pt education with new exercises with handout and to perform to tolerance. Pt education with progressing with extension of shoulder to tolerance in pain free range and able to exercise into 15-20 deg of shoulder extension without pain again today with good execution.    Bao Is progressing well towards his goals.   Pt prognosis is Good.     Pt will continue to benefit from skilled outpatient physical therapy to  address the deficits listed in the problem list box on initial evaluation, provide pt/family education and to maximize pt's level of independence in the home and community environment.     Pt's spiritual, cultural and educational needs considered and pt agreeable to plan of care and goals.     Anticipated barriers to physical therapy: R shoulder instability due to dislocation    Goals:   Short Term Goals: 3 weeks   1. Pt to be Independent with HEP for increased strength, endurance and functional mobility. Progressing 2/1/22  2. Pt to have decreased pain 0/10 after session for increased functional mobility and tolerance. MET 2/1 /22  3. Pt to increase AROM to 150 degrees R shoulder flexion for increased functional mobility. Progressing 2/1/22        Long Term Goals: 10 weeks   1. Pt to be Independent with pain management strategies and verbalize 2 for increased strength, endurance and functional mobility.  2. Pt to have decreased pain 0/10 over 50% of the day for increased functional mobility and tolerance.  3. Pt to increase AROM to 150 degrees abduction of R shoulder for increased functional mobility.  4. Pt to increase activity tolerance to 1 hrs house cleaning or cooking for without increased pain for increased overall functional activity.  5. Pt to increased R shoulder strength into flexion and abduction through available AROM to 4/5.  6. Pt to tolerate playing his piano 1 hr total with 2 session of 30 minutes without pain or compensation to return to premorbid level.      PLAN     Cont with CORINA Loaiza PT

## 2022-02-08 ENCOUNTER — CLINICAL SUPPORT (OUTPATIENT)
Dept: REHABILITATION | Facility: HOSPITAL | Age: 82
End: 2022-02-08
Attending: ORTHOPAEDIC SURGERY
Payer: MEDICARE

## 2022-02-08 DIAGNOSIS — R29.898 SHOULDER WEAKNESS: ICD-10-CM

## 2022-02-08 DIAGNOSIS — M25.311 SHOULDER INSTABILITY, RIGHT: ICD-10-CM

## 2022-02-08 PROCEDURE — 97140 MANUAL THERAPY 1/> REGIONS: CPT | Mod: PO

## 2022-02-08 PROCEDURE — 97110 THERAPEUTIC EXERCISES: CPT | Mod: PO

## 2022-02-08 NOTE — PROGRESS NOTES
[facility]   Physical Therapy Treatment Note     Name: Bao Mckeon  Clinic Number: [id number]    Therapy Diagnosis:   Encounter Diagnoses   Name Primary?    Shoulder instability, right     Shoulder weakness      Physician: RADHA Fox MD    Visit Date: 2/8/2022    Physician Orders: PT eval and treat  Medical Diagnosis:   R29.898 (ICD-10-CM) - Shoulder weakness   M25.311 (ICD-10-CM) - Shoulder instability, right         Evaluation Date: 1/5/22  Authorization Period Expiration: 12/14/22  Plan of Care Certification Period: 4/30/22  Progress Note Due: 2/5/22    Visit # / Visits authorized: 2 / 20   FOTO: 1/ 3   PTA Visit #: 1 / 5      Time In: 928 am  Time Out: 1012 am  Total Billable Time: 44 minutes,, TE 2 and MT 1     Precautions: Hard of hearing, R shoulder dislocation and self relocation limit excessive extension of R shoulder due to instability.          SUBJECTIVE     Pt reports: aching this morning I did accidentally tried to play a little piano but no pain.     He was compliant with home exercise program.  Response to previous treatment: no issues mild tightness  Functional change: less exercise and activity this week.    Pain: 0/10 pre and 0/10 post session.  Location: right shoulder      OBJECTIVE     Objective Measures updated at progress report unless specified.       TREATMENT     Total Treatment time (time-based codes) separate from Evaluation: 44 minutes     Bao received the treatments listed below:      Bao received therapeutic exercises to develop strength, endurance, ROM and posture for 24 minutes including:     Chest press with PVC in supine with 30 deg HOB elevation 2 x 10  Same position as chest press into in pain free range ( today ) of flexionScapular retraction 2 x 10 hold 3 seconds  Cervical retraction 2 x 10 hold 3 secs  Scapular retraction x 20 with 2-3 secs hold at the all for proprioceptive cues.  B shld extension YTB 3 x 10  B shld row  YTB 3 x 10  shld ER unilaterally hold 10 secs x 10    L shoulder adduction 10 x 3 with Orange TB  L shoulder flexion with elbow flexed 10 x 3 with orange TB  +shoulder abduction to tolerance with and without 1# weight pain free zone 2 x 5-10  +shoulder flexion to tolerance with and without 1# weight pain free zone 2 x 5-10    Shld ER YTB unilateral reciprocal 3 x 10   + scapular stabilization in standing CW and CCW 2 x 10 or to tolerance.     Supine vs standing at wall scaption  3 x 10 attempted both position without issues  sidelying vs standing at wall abduction 3 x10 NP  Criss 2 x 1 min scaption NP  Pulley 2 x 1 min abduction NP     Levator stretch 3 x 30 secs with strap     aBo received the following manual therapy techniques: Myofacial release and Soft tissue Mobilization were applied to the: levator, supraspinatus, teres minor, pectoralis minor, subclavius for 18 minutes, including:    Cervical traction  Posterior capsular grade III mob AP with distraction  Manually resisted R shoulder internal and external rotation 10 x 3 mid range     Bao received hot pack for 15 minutes to R shoulder neck after session.      PATIENT EDUCATION AND HOME EXERCISES     Home Exercises Provided and Patient Education Provided     Education provided:   - HEP, pain management     Written Home Exercises Provided: yes.  Exercises were reviewed and Bao was able to demonstrate them prior to the end of the session.  Bao demonstrated good  understanding of the education provided.      See EMR under per handout for exercises provided per handout 1/5/2022.    ASSESSMENT     Patient with mild apprehension with new exercise and self stretch with strap but performed without issues and good increased AROM to WFL/WNL equal L to R with manual therapy.     Bao Is progressing well towards his goals.   Pt prognosis is Good.     Pt will continue to benefit from skilled outpatient physical therapy to address the deficits listed in  the problem list box on initial evaluation, provide pt/family education and to maximize pt's level of independence in the home and community environment.     Pt's spiritual, cultural and educational needs considered and pt agreeable to plan of care and goals.     Anticipated barriers to physical therapy: R shoulder instability due to dislocation    Goals:   Short Term Goals: 3 weeks   1. Pt to be Independent with HEP for increased strength, endurance and functional mobility. Progressing 2/1/22  2. Pt to have decreased pain 0/10 after session for increased functional mobility and tolerance. MET 2/1 /22  3. Pt to increase AROM to 150 degrees R shoulder flexion for increased functional mobility. Progressing 2/1/22        Long Term Goals: 10 weeks   1. Pt to be Independent with pain management strategies and verbalize 2 for increased strength, endurance and functional mobility.  2. Pt to have decreased pain 0/10 over 50% of the day for increased functional mobility and tolerance.  3. Pt to increase AROM to 150 degrees abduction of R shoulder for increased functional mobility.  4. Pt to increase activity tolerance to 1 hrs house cleaning or cooking for without increased pain for increased overall functional activity.  5. Pt to increased R shoulder strength into flexion and abduction through available AROM to 4/5.  6. Pt to tolerate playing his piano 1 hr total with 2 session of 30 minutes without pain or compensation to return to premorbid level.      PLAN     Cont with CORINA Loaiza, PT

## 2022-02-09 NOTE — PROGRESS NOTES
CC: Right shoulder f/u     Patient returns today for follow-up of recent right shoulder traumatic anterior dislocatio injury He has been wearing an arm sleeve for conservative treatment. He is attending physical therapy, 2x a week at [facility] location. Patient reports he thinks he is doing very well and is not having any pain. He reports some soreness after physical therapy and playing the piano.  No recurrent instability events or symptoms.  He does have subjective weakness in the shoulder.    PAST MEDICAL HISTORY:   Past Medical History:   Diagnosis Date    Arthritis     Cataract     Glaucoma        PAST SURGICAL HISTORY:  Past Surgical History:   Procedure Laterality Date    CATARACT EXTRACTION W/  INTRAOCULAR LENS IMPLANT Left n/a    Multifocal IOL (Dr. Betancourt)    HERNIA REPAIR      right inguinal        FAMILY HISTORY:  Family History   Problem Relation Age of Onset    Cancer Mother 83        Colon    Cataracts Sister     Amblyopia Neg Hx     Glaucoma Neg Hx     Macular degeneration Neg Hx     Retinal detachment Neg Hx     Strabismus Neg Hx     Blindness Neg Hx        MEDICATIONS:    Current Outpatient Medications:     brimonidine 0.2% (ALPHAGAN) 0.2 % Drop, Place 1 drop into both eyes 2 (two) times daily., Disp: 30 mL, Rfl: 3    ciprofloxacin HCl (CIPRO) 500 MG tablet, , Disp: , Rfl:     dorzolamide (TRUSOPT) 2 % ophthalmic solution, Place 1 drop into both eyes 3 (three) times daily., Disp: 30 mL, Rfl: 3    latanoprost 0.005 % ophthalmic solution, Place 1 drop into both eyes once daily., Disp: 3 Bottle, Rfl: 3    tamsulosin (FLOMAX) 0.4 mg Cap, , Disp: , Rfl:     varicella-zoster [id number], PF, (SHINGRIX, PF,) 50 mcg/0.5 mL injection, Shingrix (PF) 50 mcg/0.5 mL intramuscular suspension, kit, Disp: , Rfl:     ALLERGIES:  Review of patient's allergies indicates:  No Known Allergies     REVIEW OF SYSTEMS:  Constitution: Negative. Negative for chills, fever and night sweats.     Hematologic/Lymphatic: Negative for bleeding problem. Does not bruise/bleed easily.   Skin: Negative for dry skin, itching and rash.   Musculoskeletal: Negative for falls. Neg for right shoulder pain and muscle weakness.     All other review of symptoms were reviewed and found to be noncontributory.     PHYSICAL EXAMINATION:  Vitals:  There were no vitals taken for this visit.   General: Well-developed well-nourished 81 y.o. malein no acute distress   Cardiovascular: Regular rhythm by palpation of distal pulse, normal color and temperature, no concerning varicosities on symptomatic side   Lungs: No labored breathing or wheezing appreciated   Neuro: Alert and oriented ×3   Psychiatric: well oriented to person, place and time, demonstrates normal mood and affect   Skin: No rashes, lesions or ulcers, normal temperature, turgor, and texture on uninvolved extremity    Ortho/SPM Exam  Examination of the right shoulder demonstrates minimal generalized tenderness to palpation over the anterolateral shoulder. Active forward elevation the scapular plane to 150°.  Passive to 160 .  4/5 resisted scaption.  4-out of 5 resisted ER at side. Negative belly press test.  Motor and sensory intact to the right hand.  No midline neck tenderness.  Kyphotic posture.  Sensation intact to light touch over the axillary nerve distribution.    IMAGING:  Xrays including AP RIGHT shoulder are ordered / images reviewed by me:   Probable recent fracture of the coracoid.  MRI 12/06/2021 reported more abnormal findings at the shoulder.    MRI of RIGHT shoulder (12/06/2021) reviewed by me and discussed with patient. Study shows:   1. Sequela of anterior shoulder dislocation.  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  Hill-Sachs impaction fracture of  posterolateral humeral head.  Likely nondisplaced fracture of coracoid process.   2. Tendinosis and undersurface fraying of supraspinatus  tendon.   3. Biceps tendinosis with likely partial tear at the anchor.   4. Large joint effusion with synovitis.  Subacromial subdeltoid bursitis.    ASSESSMENT:      ICD-10-CM ICD-9-CM   1. Instability of right shoulder joint  M25.311 718.81   2. Chronic right shoulder pain  M25.511 719.41    G89.29 338.29       PLAN:     -Findings and treatment options were discussed with the patient.  Responding well to conservative treatment.  Continued therapy for strengthening.  Discussed avoidance of certain at risk activity.  Would not recommend surgery at this time.  -RTC as needed  -All questions answered      Procedures

## 2022-02-10 ENCOUNTER — CLINICAL SUPPORT (OUTPATIENT)
Dept: REHABILITATION | Facility: HOSPITAL | Age: 82
End: 2022-02-10
Payer: MEDICARE

## 2022-02-10 DIAGNOSIS — M25.311 SHOULDER INSTABILITY, RIGHT: ICD-10-CM

## 2022-02-10 DIAGNOSIS — R29.898 SHOULDER WEAKNESS: ICD-10-CM

## 2022-02-10 PROCEDURE — 97140 MANUAL THERAPY 1/> REGIONS: CPT | Mod: PO

## 2022-02-10 PROCEDURE — 97110 THERAPEUTIC EXERCISES: CPT | Mod: PO

## 2022-02-10 NOTE — PROGRESS NOTES
[facility]   Physical Therapy Treatment Note     Name: Bao Mckeon  Clinic Number: [id number]    Therapy Diagnosis:   No diagnosis found.  Physician: RADHA Fox MD    Visit Date: 2/10/2022    Physician Orders: PT eval and treat  Medical Diagnosis:   R29.898 (ICD-10-CM) - Shoulder weakness   M25.311 (ICD-10-CM) - Shoulder instability, right         Evaluation Date: 1/5/22  Authorization Period Expiration: 12/14/22  Plan of Care Certification Period: 4/30/22  Progress Note Due: 2/5/22    Visit # / Visits authorized: 2 / 20   FOTO: 1/ 3   PTA Visit #: 1 / 5      Time In: 928 am  Time Out: 1013 am  Total Billable Time: 45 minutes,, TE 2 and MT 1     Precautions: Hard of hearing, R shoulder dislocation and self relocation limit excessive extension of R shoulder due to instability.          SUBJECTIVE     Pt reports:no issues from last session but did try piano playing for 5-7 minutes and stopped due to pain.     He was compliant with home exercise program.  Response to previous treatment: no issues mild tightness no pain  Functional change: 5-7 minutes piano playing    Pain: 0/10 pre and 0/10 post session.  Location: right shoulder      OBJECTIVE     Objective Measures updated at progress report unless specified.       TREATMENT     Total Treatment time (time-based codes) separate from Evaluation: 44 minutes     Bao received the treatments listed below:      Bao received therapeutic exercises to develop strength, endurance, ROM and posture for 30 minutes including:     Chest press with PVC +1 lb in supine with 30 deg HOB elevation 2 x 10  Same position as chest press into in pain free range ( today ) of flexion  Scapular retraction 2 x 10 hold 3 seconds  Cervical retraction 2 x 10 hold 3 secs  Scapular retraction x 20 with 2-3 secs hold at the all for proprioceptive cues.  B shld extension YTB 3 x 10 extension as tolerated  B shld row YTB 3 x 10 extenson as  tolerated  shld ER unilaterally hold 10 secs x 10    L shoulder adduction 10 x 3 with Orange TB  L shoulder flexion with elbow flexed 10 x 3 with orange TB  +shoulder abduction to tolerance with and without 1# weight pain free zone 2 x 5-10  +shoulder flexion to tolerance with and without 1# weight pain free zone 2 x 5-10    Shld ER YTB unilateral reciprocal 3 x 10   + scapular stabilization in standing CW and CCW 2 x 10 or to tolerance.     Supine vs standing at wall scaption  3 x 10 attempted both position without issues  sidelying vs standing at wall abduction 3 x10 NP  Criss 2 x 1 min scaption NP  Pulley 2 x 1 min abduction NP     Levator stretch 3 x 30 secs with strap     Bao received the following manual therapy techniques: Myofacial release and Soft tissue Mobilization were applied to the: levator, supraspinatus, teres minor, pectoralis minor, subclavius for 15 minutes, including:    Cervical traction  Posterior capsular grade III mob AP with distraction  Manually resisted R shoulder internal and external rotation 10 x 3 mid range     Bao received hot pack for 15 minutes to R shoulder neck after session.      PATIENT EDUCATION AND HOME EXERCISES     Home Exercises Provided and Patient Education Provided     Education provided:   - HEP, pain management     Written Home Exercises Provided: yes.  Exercises were reviewed and Bao was able to demonstrate them prior to the end of the session.  Bao demonstrated good  understanding of the education provided.      See EMR under per handout for exercises provided per handout 1/5/2022.    ASSESSMENT     Patient with mild apprehension with increased shoulder extension in exercise to 30 deg bilaterally no pain and continue with review of HEP as documented on his paper of HEP. Pt encouraged to continue playing piano for functional combined motion to encourage continue progression to premorbid level.     Bao Is progressing well towards his goals.   Pt  prognosis is Good.     Pt will continue to benefit from skilled outpatient physical therapy to address the deficits listed in the problem list box on initial evaluation, provide pt/family education and to maximize pt's level of independence in the home and community environment.     Pt's spiritual, cultural and educational needs considered and pt agreeable to plan of care and goals.     Anticipated barriers to physical therapy: R shoulder instability due to dislocation    Goals:   Short Term Goals: 3 weeks   1. Pt to be Independent with HEP for increased strength, endurance and functional mobility. Progressing 2/10/22  2. Pt to have decreased pain 0/10 after session for increased functional mobility and tolerance. MET 2/1 /22  3. Pt to increase AROM to 150 degrees R shoulder flexion for increased functional mobility. Progressing 2/1/22        Long Term Goals: 10 weeks   1. Pt to be Independent with pain management strategies and verbalize 2 for increased strength, endurance and functional mobility.  2. Pt to have decreased pain 0/10 over 50% of the day for increased functional mobility and tolerance.  3. Pt to increase AROM to 150 degrees abduction of R shoulder for increased functional mobility.  4. Pt to increase activity tolerance to 1 hrs house cleaning or cooking for without increased pain for increased overall functional activity.  5. Pt to increased R shoulder strength into flexion and abduction through available AROM to 4/5.  6. Pt to tolerate playing his piano 1 hr total with 2 session of 30 minutes without pain or compensation to return to premorbid level.      PLAN     Cont with CORINA Loaiza, PT

## 2022-02-15 ENCOUNTER — HOSPITAL ENCOUNTER (OUTPATIENT)
Dept: RADIOLOGY | Facility: HOSPITAL | Age: 82
Discharge: HOME | End: 2022-02-15
Attending: ORTHOPAEDIC SURGERY
Payer: MEDICARE

## 2022-02-15 ENCOUNTER — OFFICE VISIT (OUTPATIENT)
Dept: SPORTS MEDICINE | Facility: CLINIC | Age: 82
End: 2022-02-15
Payer: MEDICARE

## 2022-02-15 ENCOUNTER — CLINICAL SUPPORT (OUTPATIENT)
Dept: REHABILITATION | Facility: HOSPITAL | Age: 82
End: 2022-02-15
Payer: MEDICARE

## 2022-02-15 VITALS
HEART RATE: 58 BPM | HEIGHT: 65 IN | SYSTOLIC BLOOD PRESSURE: 178 MMHG | DIASTOLIC BLOOD PRESSURE: 86 MMHG | WEIGHT: 138 LBS | BODY MASS INDEX: 22.99 KG/M2

## 2022-02-15 DIAGNOSIS — M25.511 RIGHT SHOULDER PAIN, UNSPECIFIED CHRONICITY: ICD-10-CM

## 2022-02-15 DIAGNOSIS — M25.311 INSTABILITY OF RIGHT SHOULDER JOINT: Primary | ICD-10-CM

## 2022-02-15 DIAGNOSIS — R29.898 SHOULDER WEAKNESS: ICD-10-CM

## 2022-02-15 DIAGNOSIS — G89.29 CHRONIC RIGHT SHOULDER PAIN: ICD-10-CM

## 2022-02-15 DIAGNOSIS — M25.511 CHRONIC RIGHT SHOULDER PAIN: ICD-10-CM

## 2022-02-15 DIAGNOSIS — M25.311 SHOULDER INSTABILITY, RIGHT: ICD-10-CM

## 2022-02-15 PROCEDURE — 3079F DIAST BP 80-89 MM HG: CPT | Mod: CPTII,S$GLB,, | Performed by: ORTHOPAEDIC SURGERY

## 2022-02-15 PROCEDURE — 3288F FALL RISK ASSESSMENT DOCD: CPT | Mod: CPTII,S$GLB,, | Performed by: ORTHOPAEDIC SURGERY

## 2022-02-15 PROCEDURE — 99999 PR PBB SHADOW E&M-EST. PATIENT-LVL III: ICD-10-PCS | Mod: PBBFAC,,, | Performed by: ORTHOPAEDIC SURGERY

## 2022-02-15 PROCEDURE — 73020 X-RAY EXAM OF SHOULDER: CPT | Mod: TC,RT

## 2022-02-15 PROCEDURE — 73020 X-RAY EXAM OF SHOULDER: CPT | Mod: 26,RT,, | Performed by: RADIOLOGY

## 2022-02-15 PROCEDURE — 3079F PR MOST RECENT DIASTOLIC BLOOD PRESSURE 80-89 MM HG: ICD-10-PCS | Mod: CPTII,S$GLB,, | Performed by: ORTHOPAEDIC SURGERY

## 2022-02-15 PROCEDURE — 3288F PR FALLS RISK ASSESSMENT DOCUMENTED: ICD-10-PCS | Mod: CPTII,S$GLB,, | Performed by: ORTHOPAEDIC SURGERY

## 2022-02-15 PROCEDURE — 1101F PR PT FALLS ASSESS DOC 0-1 FALLS W/OUT INJ PAST YR: ICD-10-PCS | Mod: CPTII,S$GLB,, | Performed by: ORTHOPAEDIC SURGERY

## 2022-02-15 PROCEDURE — 1101F PT FALLS ASSESS-DOCD LE1/YR: CPT | Mod: CPTII,S$GLB,, | Performed by: ORTHOPAEDIC SURGERY

## 2022-02-15 PROCEDURE — 97110 THERAPEUTIC EXERCISES: CPT | Mod: PO

## 2022-02-15 PROCEDURE — 1126F PR PAIN SEVERITY QUANTIFIED, NO PAIN PRESENT: ICD-10-PCS | Mod: CPTII,S$GLB,, | Performed by: ORTHOPAEDIC SURGERY

## 2022-02-15 PROCEDURE — 99213 PR OFFICE/OUTPT VISIT, EST, LEVL III, 20-29 MIN: ICD-10-PCS | Mod: S$GLB,,, | Performed by: ORTHOPAEDIC SURGERY

## 2022-02-15 PROCEDURE — 1126F AMNT PAIN NOTED NONE PRSNT: CPT | Mod: CPTII,S$GLB,, | Performed by: ORTHOPAEDIC SURGERY

## 2022-02-15 PROCEDURE — 3077F PR MOST RECENT SYSTOLIC BLOOD PRESSURE >= 140 MM HG: ICD-10-PCS | Mod: CPTII,S$GLB,, | Performed by: ORTHOPAEDIC SURGERY

## 2022-02-15 PROCEDURE — 3077F SYST BP >= 140 MM HG: CPT | Mod: CPTII,S$GLB,, | Performed by: ORTHOPAEDIC SURGERY

## 2022-02-15 PROCEDURE — 99999 PR PBB SHADOW E&M-EST. PATIENT-LVL III: CPT | Mod: PBBFAC,,, | Performed by: ORTHOPAEDIC SURGERY

## 2022-02-15 PROCEDURE — 99213 OFFICE O/P EST LOW 20 MIN: CPT | Mod: S$GLB,,, | Performed by: ORTHOPAEDIC SURGERY

## 2022-02-15 PROCEDURE — 73020 XR SHOULDER 1 VIEW RIGHT: ICD-10-PCS | Mod: 26,RT,, | Performed by: RADIOLOGY

## 2022-02-15 PROCEDURE — 97140 MANUAL THERAPY 1/> REGIONS: CPT | Mod: PO

## 2022-02-15 NOTE — PROGRESS NOTES
[facility]   Physical Therapy Treatment Note     Name: Bao Mckeon  Clinic Number: [id number]    Therapy Diagnosis:   Encounter Diagnoses   Name Primary?    Shoulder instability, right     Shoulder weakness      Physician: RADHA Fox MD    Visit Date: 2/15/2022    Physician Orders: PT eval and treat  Medical Diagnosis:   R29.898 (ICD-10-CM) - Shoulder weakness   M25.311 (ICD-10-CM) - Shoulder instability, right         Evaluation Date: 1/5/22  Authorization Period Expiration: 12/14/22  Plan of Care Certification Period: 4/30/22  Progress Note Due: 2/5/22    Visit # / Visits authorized: 2 / 20   FOTO: 1/ 3   PTA Visit #: 1 / 5      Time In: 925 am  Time Out: 1008 am  Total Billable Time: 43 minutes,, TE 2, MT 1     Precautions: Hard of hearing, R shoulder dislocation and self relocation limit excessive extension of R shoulder due to instability.          SUBJECTIVE     Pt reports:no issues from last session but did try piano playing for 5-7 minutes and stopped due to pain.     He was compliant with home exercise program.  Response to previous treatment: no issues mild tightness no pain  Functional change: 5-7 minutes piano playing    Pain: 0/10 pre and 0/10 post session.  Location: right shoulder      OBJECTIVE     Objective Measures updated at progress report unless specified.       TREATMENT     Total Treatment time (time-based codes) separate from Evaluation: 43 minutes     Bao received the treatments listed below:      Bao received therapeutic exercises to develop strength, endurance, ROM and posture for 33 minutes including:     Chest press with 3lb dowel + 3 lb in supine with 30 deg HOB elevation 2 x 10  Same position as chest press into in pain free range ( today ) of flexion  Scapular retraction 2 x 10 hold 3 seconds  Cervical retraction 2 x 10 hold 3 secs  Scapular retraction x 20 with 2-3 secs hold at the all for proprioceptive cues.  B shld extension  YTB 3 x 10 extension as tolerated  B shld row YTB 3 x 10 extenson as tolerated  shld ER unilaterally hold 10 secs x 10    L shoulder adduction 10 x 3 with Orange TB  L shoulder flexion with elbow flexed 10 x 3 with orange TB  +shoulder abduction to tolerance with and without 1# weight pain free zone 2 x 5-10  +shoulder flexion to tolerance with and without 1# weight pain free zone 2 x 5-10    Shld ER YTB unilateral reciprocal 3 x 10   + scapular stabilization in standing CW and CCW 2 x 10 or to tolerance.     Supine vs standing at wall scaption  3 x 10 attempted both position without issues  sidelying vs standing at wall abduction 3 x10 NP       Levator/upper trap stretch 10 x 1-2 secs with strap     Bao received the following manual therapy techniques: Myofacial release and Soft tissue Mobilization were applied to the: levator, supraspinatus, teres minor, pectoralis minor, subclavius for 10 minutes, including:    Cervical traction  Posterior capsular grade III mob AP with distraction  Manually resisted R shoulder internal and external rotation 10 x 3 mid range     Bao received hot pack for 0 minutes to R shoulder neck after session.      PATIENT EDUCATION AND HOME EXERCISES     Home Exercises Provided and Patient Education Provided     Education provided:   - HEP, pain management     Written Home Exercises Provided: yes.  Exercises were reviewed and Bao was able to demonstrate them prior to the end of the session.  Bao demonstrated good  understanding of the education provided.      See EMR under per handout for exercises provided per handout 1/5/2022.    ASSESSMENT     Patient with  Independent management verbalize increased reps as tolerated and stopping with initial signs of pain with 5 min increment for playing piano and 3 total verbal signs of independent management and along with independence with HEP program and full AROM flexion and abduction, extension, ER. Pt progressing with  strengthening.     Bao Is progressing well towards his goals.   Pt prognosis is Good.     Pt will continue to benefit from skilled outpatient physical therapy to address the deficits listed in the problem list box on initial evaluation, provide pt/family education and to maximize pt's level of independence in the home and community environment.     Pt's spiritual, cultural and educational needs considered and pt agreeable to plan of care and goals.     Anticipated barriers to physical therapy: R shoulder instability due to dislocation    Goals:   Short Term Goals: 3 weeks   1. Pt to be Independent with HEP for increased strength, endurance and functional mobility. MET 2/15/22  2. Pt to have decreased pain 0/10 after session for increased functional mobility and tolerance. MET 2/1 /22  3. Pt to increase AROM to 150 degrees R shoulder flexion for increased functional mobility. MET 2/15/22        Long Term Goals: 10 weeks   1. Pt to be Independent with pain management strategies and verbalize 2 for increased strength, endurance and functional mobility. MET 2/15/22  2. Pt to have decreased pain 0/10 over 50% of the day for increased functional mobility and tolerance.  3. Pt to increase AROM to 150 degrees abduction of R shoulder for increased functional mobility.  4. Pt to increase activity tolerance to 1 hrs house cleaning or cooking for without increased pain for increased overall functional activity.  5. Pt to increased R shoulder strength into flexion and abduction through available AROM to 4/5.  6. Pt to tolerate playing his piano 1 hr total with 2 session of 30 minutes without pain or compensation to return to premorbid level.      PLAN     Cont with CORINA Loaiza, PT

## 2022-02-17 ENCOUNTER — CLINICAL SUPPORT (OUTPATIENT)
Dept: REHABILITATION | Facility: HOSPITAL | Age: 82
End: 2022-02-17
Payer: MEDICARE

## 2022-02-17 ENCOUNTER — OFFICE VISIT (OUTPATIENT)
Dept: URGENT CARE | Facility: CLINIC | Age: 82
End: 2022-02-17
Payer: MEDICARE

## 2022-02-17 VITALS
SYSTOLIC BLOOD PRESSURE: 157 MMHG | TEMPERATURE: 98 F | BODY MASS INDEX: 22.99 KG/M2 | DIASTOLIC BLOOD PRESSURE: 85 MMHG | HEIGHT: 65 IN | HEART RATE: 58 BPM | WEIGHT: 138 LBS | OXYGEN SATURATION: 96 % | RESPIRATION RATE: 17 BRPM

## 2022-02-17 DIAGNOSIS — R03.0 ELEVATED BLOOD-PRESSURE READING, WITHOUT DIAGNOSIS OF HYPERTENSION: ICD-10-CM

## 2022-02-17 DIAGNOSIS — R42 DIZZINESS: ICD-10-CM

## 2022-02-17 DIAGNOSIS — R29.898 SHOULDER WEAKNESS: ICD-10-CM

## 2022-02-17 DIAGNOSIS — B34.9 ACUTE VIRAL SYNDROME: Primary | ICD-10-CM

## 2022-02-17 DIAGNOSIS — R51.9 NONINTRACTABLE HEADACHE, UNSPECIFIED CHRONICITY PATTERN, UNSPECIFIED HEADACHE TYPE: ICD-10-CM

## 2022-02-17 DIAGNOSIS — R53.83 FATIGUE, UNSPECIFIED TYPE: ICD-10-CM

## 2022-02-17 DIAGNOSIS — M25.311 SHOULDER INSTABILITY, RIGHT: ICD-10-CM

## 2022-02-17 LAB
CTP QC/QA: YES
CTP QC/QA: YES
POC MOLECULAR INFLUENZA A AGN: NEGATIVE
POC MOLECULAR INFLUENZA B AGN: NEGATIVE
SARS-COV-2 RDRP RESP QL NAA+PROBE: NEGATIVE

## 2022-02-17 PROCEDURE — 3079F PR MOST RECENT DIASTOLIC BLOOD PRESSURE 80-89 MM HG: ICD-10-PCS | Mod: CPTII,S$GLB,,

## 2022-02-17 PROCEDURE — U0002: ICD-10-PCS | Mod: QW,S$GLB,,

## 2022-02-17 PROCEDURE — 1160F RVW MEDS BY RX/DR IN RCRD: CPT | Mod: CPTII,S$GLB,,

## 2022-02-17 PROCEDURE — 3077F PR MOST RECENT SYSTOLIC BLOOD PRESSURE >= 140 MM HG: ICD-10-PCS | Mod: CPTII,S$GLB,,

## 2022-02-17 PROCEDURE — 3079F DIAST BP 80-89 MM HG: CPT | Mod: CPTII,S$GLB,,

## 2022-02-17 PROCEDURE — 87502 POCT INFLUENZA A/B MOLECULAR: ICD-10-PCS | Mod: QW,S$GLB,,

## 2022-02-17 PROCEDURE — 97110 THERAPEUTIC EXERCISES: CPT | Mod: PO

## 2022-02-17 PROCEDURE — 87502 INFLUENZA DNA AMP PROBE: CPT | Mod: QW,S$GLB,,

## 2022-02-17 PROCEDURE — U0002 COVID-19 LAB TEST NON-CDC: HCPCS | Mod: QW,S$GLB,,

## 2022-02-17 PROCEDURE — 1159F PR MEDICATION LIST DOCUMENTED IN MEDICAL RECORD: ICD-10-PCS | Mod: CPTII,S$GLB,,

## 2022-02-17 PROCEDURE — 97140 MANUAL THERAPY 1/> REGIONS: CPT | Mod: PO

## 2022-02-17 PROCEDURE — 99213 OFFICE O/P EST LOW 20 MIN: CPT | Mod: S$GLB,CS,,

## 2022-02-17 PROCEDURE — 99213 PR OFFICE/OUTPT VISIT, EST, LEVL III, 20-29 MIN: ICD-10-PCS | Mod: S$GLB,CS,,

## 2022-02-17 PROCEDURE — 1160F PR REVIEW ALL MEDS BY PRESCRIBER/CLIN PHARMACIST DOCUMENTED: ICD-10-PCS | Mod: CPTII,S$GLB,,

## 2022-02-17 PROCEDURE — 3077F SYST BP >= 140 MM HG: CPT | Mod: CPTII,S$GLB,,

## 2022-02-17 PROCEDURE — 1159F MED LIST DOCD IN RCRD: CPT | Mod: CPTII,S$GLB,,

## 2022-02-17 NOTE — PATIENT INSTRUCTIONS
PLEASE READ ALL DISCHARGE INSTRUCTIONS    Dizziness: if you have any worsening symptoms or associated with nausea, vomiting, chest pain, palpitations or changes in vision go to the emergency room immediately  - Rest.    - Drink plenty of fluids.  - Viral upper respiratory infections typically run their course in 10-14 days.      - You can take over-the-counter claritin, zyrtec, allegra, or xyzal as directed. These are antihistamines that can help with runny nose, nasal congestion, sneezing, and helps to dry up post-nasal drip, which usually causes sore throat and cough.               - If you do NOT have high blood pressure, you may use a decongestant form (D)  of this medication (ie. Claritin- D, zyrtec-D, allegra-D) or if you do not take the D form, you can take sudafed (pseudoephedrine) over the counter, which is a decongestant. Do NOT take two decongestant (D) medications at the same time (such as mucinex-D and claritin-D or plain sudafed and claritin D). Dextromethorphan (DM) is a cough suppressant over the counter (ie. mucinex DM, robitussin, delsym; dayquil/nyquil has DM as well.)    -If you DO have high blood pressure, you may take Coricidin HBP for sinus congestion and Delsym for cough.      - You can use Flonase (fluticasone) nasal spray as directed for sinus congestion and postnasal drip. This is a steroid nasal spray that works locally over time to decrease the inflammation in your nose/sinuses and help with allergic symptoms. This is not an quick- relief spray like afrin, but it works well if used daily.  Discontinue if you develop nose bleed  - Use nasal saline prior to Flonase.  - Use Ocean Spray Nasal Saline 1-3 puffs each nostril every 2-3 hours then blow out onto tissue. This is to irrigate the nasal passage way to clear the sinus openings. Use until sinus problem resolved.     - You can take plain Mucinex (guaifenesin) 1200 mg twice a day to help loosen mucous.      - Warm salt water gargles can  help with sore throat     - Warm tea with honey can help with cough. Honey is a natural cough suppressant.    - Acetaminophen (tylenol) or Ibuprofen (advil,motrin) as directed as needed for fever/pain. Avoid tylenol if you have a history of liver disease. Do not take ibuprofen if you have a history of GI bleeding, kidney disease, or if you take blood thinners.     Elevated Blood Pressure  Your blood pressure was elevated during your visit to the urgent care.  It was not so high that immediate care was needed but it is recommended that you monitor your blood pressure over the next week or two to make sure that it is not staying elevated.  Please have your blood pressure taken 2-3 times daily at different times of the day.  Write all of those blood pressures down and record the time that they were taken.  Keep all that information and take it with you to see your Primary Care Physician.  If your blood pressure is consistently above 140/90 you will need to follow up with your PCP more quickly.      - You must understand that you have received an Urgent Care treatment only and that you may be released before all of your medical problems are known or treated.   - You, the patient, will arrange for follow up care as instructed.   - If your condition worsens or fails to improve we recommend that you receive another evaluation at the ER immediately or contact your PCP to discuss your concerns or return here.   - Follow up with your PCP or specialty clinic as directed in the next 1-2 weeks if not improved or as needed.  You can call [phone] to schedule an appointment with the appropriate provider.        Patient Education       Dizziness, Nonvertigo, Discharge Instructions   About this topic   Dizziness means different things to different people. For example, you may feel light-headed or like you are about to pass out. You may have trouble walking straight and feel like you are about to fall when you are dizzy. Some  people feel as though they are spinning or the world around them is spinning. This spinning feeling is known as vertigo.  Many things can cause you to feel dizzy. Some are serious things like heart problems or a stroke. Less serious things, like getting up too quickly or not drinking enough fluids, can also cause you to feel dizzy. Some medicines can also cause you to feel dizzy.  You may feel dizzy for a short time and then it may go away. It may also last for a long time and disrupt your daily activities. Treatment for dizziness depends on the cause.     What care is needed at home?   · Ask your doctor what you need to do when you go home. Make sure you ask questions if you do not understand what the doctor says.  · Avoid changing your position too quickly. Take care when moving from sitting to standing.  · Sit on the edge of the bed for a few minutes before you stand up. Start to walk slowly after you stand up.  · Move your legs often if you need to sit or  one position for a long time.  · Be sure to drink enough fluids, even if you do not feel thirsty.  · Sit or lie down right away if you feel faint or dizzy. Take extra care to protect yourself from falls. Avoid driving when dizzy. If you feel dizzy while driving, pull over right away.  · If you must walk somewhere when you feel dizzy, you may need to use a cane or walker, or have another person help you so you dont fall.  What follow-up care is needed?   · Your doctor may ask you to make visits to the office to check on your progress. Be sure to keep these visits.  · Your doctor may send you to a doctor who specializes in the brain, nerves, or heart to figure out the cause of your dizziness.  What drugs may be needed?   The doctor may order drugs to:  · Treat the condition that causes dizziness  · Decrease the feeling of dizziness  Will physical activity be limited?   · Avoid activities that may cause dizzy spells.  · Do not drive if you are feeling  dizzy.  · Do not use running machines if you are feeling dizzy.  What problems could happen?   · Fall and hurt yourself  · Get in a car accident  What can be done to prevent this health problem?   · Drink lots of fluids each day.  · Have your doctor review your drugs and try to decrease drugs that cause dizziness.  · Use a blood pressure monitor.  · Avoid drinking alcohol or using other drugs  When do I need to call the doctor?   · You have new weakness in your arms or legs.  · You develop new trouble speaking, swallowing, seeing, or hearing.  · You have chest pain, an irregular heartbeat, or trouble breathing.  · You have a seizure.  · You become unable to walk or stand because of your dizziness.  · Your dizziness continues for a long time or gets worse.  Helpful tips   If you are having dizzy spells:  · Sit down in a chair and put your head between your knees.  · Breathe deeply and slowly.  · Stand up slowly.  · Get some fresh air.  Teach Back: Helping You Understand   The Teach Back Method helps you understand the information we are giving you. After you talk with the staff, tell them in your own words what you learned. This helps to make sure the staff has described each thing clearly. It also helps to explain things that may have been confusing. Before going home, make sure you can do these:  · I can tell you about my condition.  · I can tell you what I will do to help me stay safe when moving about.  · I can tell you what I will do if I have problems talking or moving.  Where can I learn more?   [facility]  http://www.health.[place]/your-health/conditions-and-treatments/diseases-and-illnesses/dizziness   [facility] Choices  http://www.nhs.uk/conditions/dizziness/pages/introduction.aspx   Last Reviewed Date   2021-06-10  Consumer Information Use and Disclaimer   This information is not specific medical advice and does not replace information you receive from your health care provider. This is only a brief  summary of general information. It does NOT include all information about conditions, illnesses, injuries, tests, procedures, treatments, therapies, discharge instructions or life-style choices that may apply to you. You must talk with your health care provider for complete information about your health and treatment options. This information should not be used to decide whether or not to accept your health care providers advice, instructions or recommendations. Only your health care provider has the knowledge and training to provide advice that is right for you.  Copyright   Copyright © 2021 [employer] and its affiliates and/or licensors. All rights reserved.

## 2022-02-17 NOTE — PROGRESS NOTES
[facility]   Physical Therapy Treatment Note     Name: Bao Mckeon  Clinic Number: [id number]    Therapy Diagnosis:   Encounter Diagnoses   Name Primary?    Shoulder instability, right     Shoulder weakness      Physician: RADHA Fox MD    Visit Date: 2/17/2022    Physician Orders: PT eval and treat  Medical Diagnosis:   R29.898 (ICD-10-CM) - Shoulder weakness   M25.311 (ICD-10-CM) - Shoulder instability, right         Evaluation Date: 1/5/22  Authorization Period Expiration: 12/14/22  Plan of Care Certification Period: 4/30/22  Progress Note Due: 2/5/22    Visit # / Visits authorized: 2 / 20   FOTO: 1/ 3   PTA Visit #: 1 / 5      Time In: 928 am  Time Out: 1015 am  Total Billable Time: 47 minutes,, TE 2, MT 1     Precautions: Hard of hearing, R shoulder dislocation and self relocation limit excessive extension of R shoulder due to instability.          SUBJECTIVE     Pt reports:no issues from last session but did try piano playing for 15 minutes and stopped due to pain.     He was compliant with home exercise program.  Response to previous treatment: no issues mild tightness no pain  Functional change: 5-7 minutes piano playing    Pain: 0/10 pre and 0/10 post session.  Location: right shoulder      OBJECTIVE     Objective Measures updated at progress report unless specified.       TREATMENT     Total Treatment time (time-based codes) separate from Evaluation: 43 minutes     Bao received the treatments listed below:      Bao received therapeutic exercises to develop strength, endurance, ROM and posture for 37 minutes including:     Chest press with 3lb dowel + 3 lb in supine with 30 deg HOB elevation 2 x 10  Same position as chest press into in pain free range ( today ) of flexion  Supine small amplitude flexion  with 1# weight   Scapular retraction 2 x 10 hold 3 seconds  Cervical retraction 2 x 10 hold 3 secs  Scapular retraction x 20 with 2-3 secs hold  at the all for proprioceptive cues.  B shld extension YTB 3 x 10 extension as tolerated  B shld row YTB 3 x 10 extenson as tolerated  shld ER unilaterally hold 10 secs x 10    L shoulder adduction 10 x 3 with Orange TB  L shoulder flexion with elbow flexed 10 x 3 with orange TB  +shoulder abduction to tolerance with and without 1# weight pain free zone 2 x 5-10  +shoulder flexion to tolerance with and without 1# weight pain free zone 2 x 5-10    Shld ER YTB unilateral reciprocal 3 x 10   + scapular stabilization in standing CW and CCW 2 x 10 or to tolerance.     Supine vs standing at wall scaption  3 x 10 attempted both position without issues  sidelying vs standing at wall abduction 3 x10 NP       Levator/upper trap stretch 10 x 1-2 secs with strap     Bao received the following manual therapy techniques: Myofacial release and Soft tissue Mobilization were applied to the: levator, supraspinatus, teres minor, pectoralis minor, subclavius for 10 minutes, including:    Cervical traction  Posterior capsular grade III mob AP with distraction  Manually resisted R shoulder internal and external rotation 10 x 3 mid range     Bao received hot pack for 0 minutes to R shoulder neck after session.      PATIENT EDUCATION AND HOME EXERCISES     Home Exercises Provided and Patient Education Provided     Education provided:   - HEP, pain management     Written Home Exercises Provided: yes.  Exercises were reviewed and Bao was able to demonstrate them prior to the end of the session.  Bao demonstrated good  understanding of the education provided.      See EMR under per handout for exercises provided per handout 1/5/2022.    ASSESSMENT     Patient with min cues HEP program starting with ER as he fatigues with exercise later in the day. Pt with good mechanics good progression with good control through available motion today without pain.     Bao Is progressing well towards his goals.   Pt prognosis is Good.      Pt will continue to benefit from skilled outpatient physical therapy to address the deficits listed in the problem list box on initial evaluation, provide pt/family education and to maximize pt's level of independence in the home and community environment.     Pt's spiritual, cultural and educational needs considered and pt agreeable to plan of care and goals.     Anticipated barriers to physical therapy: R shoulder instability due to dislocation    Goals:   Short Term Goals: 3 weeks   1. Pt to be Independent with HEP for increased strength, endurance and functional mobility. MET 2/15/22  2. Pt to have decreased pain 0/10 after session for increased functional mobility and tolerance. MET 2/1 /22  3. Pt to increase AROM to 150 degrees R shoulder flexion for increased functional mobility. MET 2/15/22        Long Term Goals: 10 weeks   1. Pt to be Independent with pain management strategies and verbalize 2 for increased strength, endurance and functional mobility. MET 2/15/22  2. Pt to have decreased pain 0/10 over 50% of the day for increased functional mobility and tolerance.  3. Pt to increase AROM to 150 degrees abduction of R shoulder for increased functional mobility.  4. Pt to increase activity tolerance to 1 hrs house cleaning or cooking for without increased pain for increased overall functional activity.  5. Pt to increased R shoulder strength into flexion and abduction through available AROM to 4/5.  6. Pt to tolerate playing his piano 1 hr total with 2 session of 30 minutes without pain or compensation to return to premorbid level.      PLAN     Cont with CORINA Loaiza, PT

## 2022-02-17 NOTE — PROGRESS NOTES
"Subjective:       Patient ID: Bao cMkeon is a 81 y.o. male.    Vitals:  height is 5' 5" (1.651 m) and weight is 62.6 kg (138 lb). His oral temperature is 98.3 °F (36.8 °C). His blood pressure is 157/85 (abnormal) and his pulse is 58 (abnormal). His respiration is 17 and oxygen saturation is 96%.     Chief Complaint: Headache    Patient complains of fatigue, feels tired and on and off headaches, symptoms started yesterday, patient is vaccinated and states unknown exposure to Covid 19.  Patient is requesting Covid test.    Provider note starts below:  Patient presents with feeling of dizziness and feeling worn down with nasal congestion and scratchy throat.  He states the dizziness happened this morning when he got out of bed and again at his PT appointment and a couple other times throughout the day.  He also states he has denies but this is chronic and he has had it for years.  Patient was concerned for having COVID so he is coming in to get COVID tested.  He states the symptoms started this morning however yesterday he had a slight feeling in his head.  Denies any nausea, changes in vision, hearing loss, ear pain, chest pain, palpitations, shortness of breath.  He has taken aspirin for his symptoms.  Patient denies a history of diagnosed hypertension but states when he does not work out for periods of time as blood pressure will be high.  He follows with his PCP for this.    Headache   This is a new problem. The current episode started yesterday. The pain is located in the frontal region. The pain does not radiate. The pain quality is not similar to prior headaches. The quality of the pain is described as aching. The pain is at a severity of 2/10. The patient is experiencing no pain. Associated symptoms include dizziness, a sore throat (scratchy) and tinnitus. Pertinent negatives include no abdominal pain, blurred vision, coughing, ear pain, fever, nausea, numbness, sinus pressure or vomiting. Hearing " loss: chronic. Nothing aggravates the symptoms. Treatments tried: Baby Aspirin. The treatment provided mild relief.       Constitution: Positive for fatigue. Negative for appetite change, chills and fever.   HENT: Positive for tinnitus, congestion and sore throat (scratchy). Negative for ear pain, ear discharge, sinus pain and sinus pressure. Hearing loss: chronic.    Cardiovascular: Negative for chest pain and palpitations.   Eyes: Negative for vision loss, double vision and blurred vision.   Respiratory: Negative for chest tightness, cough, shortness of breath and wheezing.    Gastrointestinal: Negative for abdominal pain, nausea, vomiting and diarrhea.   Neurological: Positive for dizziness, light-headedness and headaches. Negative for history of vertigo, facial drooping, numbness and tingling.       Objective:      Physical Exam   Constitutional: He is oriented to person, place, and time. He appears well-developed and well-nourished. He is cooperative.  Non-toxic appearance. He does not have a sickly appearance. He does not appear ill. No distress.   HENT:   Head: Normocephalic and atraumatic.   Ears:   Right Ear: Hearing, tympanic membrane, external ear and ear canal normal.   Left Ear: Hearing, tympanic membrane, external ear and ear canal normal.   Nose: Nose normal. No mucosal edema, rhinorrhea or nasal deformity. No epistaxis. Right sinus exhibits no maxillary sinus tenderness and no frontal sinus tenderness. Left sinus exhibits no maxillary sinus tenderness and no frontal sinus tenderness.   Mouth/Throat: Uvula is midline, oropharynx is clear and moist and mucous membranes are normal. No trismus in the jaw. Normal dentition. No uvula swelling. No oropharyngeal exudate, posterior oropharyngeal edema or posterior oropharyngeal erythema.   Eyes: Conjunctivae and lids are normal. No scleral icterus.   Neck: Trachea normal and phonation normal. Neck supple. No edema present. No erythema present. No neck  rigidity present.   Cardiovascular: Normal rate, regular rhythm, normal heart sounds, intact distal pulses and normal pulses.   Pulmonary/Chest: Effort normal and breath sounds normal. No respiratory distress. He has no decreased breath sounds. He has no wheezes. He has no rhonchi. He has no rales.   Abdominal: Normal appearance.   Musculoskeletal: Normal range of motion.         General: No deformity or edema. Normal range of motion.   Lymphadenopathy:     He has no cervical adenopathy.   Neurological: He is alert and oriented to person, place, and time. No cranial nerve deficit. He exhibits normal muscle tone. Coordination normal.   Skin: Skin is warm, dry, intact, not diaphoretic and not pale.   Psychiatric: He has a normal mood and affect. His speech is normal and behavior is normal. Judgment and thought content normal. Cognition and memory  Nursing note and vitals reviewed.    Results for orders placed or performed in visit on 02/17/22   POCT COVID-19 Rapid Screening   Result Value Ref Range    POC Rapid COVID Negative Negative     Acceptable Yes    POCT Influenza A/B MOLECULAR   Result Value Ref Range    POC Molecular Influenza A Ag Negative Negative, Not Reported    POC Molecular Influenza B Ag Negative Negative, Not Reported     Acceptable Yes            Assessment:       1. Acute viral syndrome    2. Fatigue, unspecified type    3. Nonintractable headache, unspecified chronicity pattern, unspecified headache type    4. Dizziness    5. Elevated blood-pressure reading, without diagnosis of hypertension          Plan:     labs reviewed.  Orthostatic vital signs within normal limits.   Instructed patient to go home and make sure he hydrated.  Discussed this could be from feeling dehydrated or another viral illness.  Neurological exam intact and not indicating any acute CVA or cardiac origin. EKG offered but patient refused.  Patient states that this has happened in the past and he  forgot and apologized for coming in today.  Patient given discharge instructions to recheck blood pressure and follow-up with primary care for any persistent symptoms.  Given strict ED precautions if any symptoms worsen or if he has any alarm symptoms as discussed in our visit.    Acute viral syndrome    Fatigue, unspecified type  -     POCT COVID-19 Rapid Screening    Nonintractable headache, unspecified chronicity pattern, unspecified headache type  -     POCT Influenza A/B MOLECULAR    Dizziness  -     Orthostatic vital signs  -     Cancel: IN OFFICE EKG 12-LEAD (to Muse)    Elevated blood-pressure reading, without diagnosis of hypertension          Patient Instructions   PLEASE READ ALL DISCHARGE INSTRUCTIONS    Dizziness: if you have any worsening symptoms or associated with nausea, vomiting, chest pain, palpitations or changes in vision go to the emergency room immediately  - Rest.    - Drink plenty of fluids.  - Viral upper respiratory infections typically run their course in 10-14 days.      - You can take over-the-counter claritin, zyrtec, allegra, or xyzal as directed. These are antihistamines that can help with runny nose, nasal congestion, sneezing, and helps to dry up post-nasal drip, which usually causes sore throat and cough.               - If you do NOT have high blood pressure, you may use a decongestant form (D)  of this medication (ie. Claritin- D, zyrtec-D, allegra-D) or if you do not take the D form, you can take sudafed (pseudoephedrine) over the counter, which is a decongestant. Do NOT take two decongestant (D) medications at the same time (such as mucinex-D and claritin-D or plain sudafed and claritin D). Dextromethorphan (DM) is a cough suppressant over the counter (ie. mucinex DM, robitussin, delsym; dayquil/nyquil has DM as well.)    -If you DO have high blood pressure, you may take Coricidin HBP for sinus congestion and Delsym for cough.      - You can use Flonase (fluticasone) nasal  spray as directed for sinus congestion and postnasal drip. This is a steroid nasal spray that works locally over time to decrease the inflammation in your nose/sinuses and help with allergic symptoms. This is not an quick- relief spray like afrin, but it works well if used daily.  Discontinue if you develop nose bleed  - Use nasal saline prior to Flonase.  - Use Ocean Spray Nasal Saline 1-3 puffs each nostril every 2-3 hours then blow out onto tissue. This is to irrigate the nasal passage way to clear the sinus openings. Use until sinus problem resolved.     - You can take plain Mucinex (guaifenesin) 1200 mg twice a day to help loosen mucous.      - Warm salt water gargles can help with sore throat     - Warm tea with honey can help with cough. Honey is a natural cough suppressant.    - Acetaminophen (tylenol) or Ibuprofen (advil,motrin) as directed as needed for fever/pain. Avoid tylenol if you have a history of liver disease. Do not take ibuprofen if you have a history of GI bleeding, kidney disease, or if you take blood thinners.     Elevated Blood Pressure  Your blood pressure was elevated during your visit to the urgent care.  It was not so high that immediate care was needed but it is recommended that you monitor your blood pressure over the next week or two to make sure that it is not staying elevated.  Please have your blood pressure taken 2-3 times daily at different times of the day.  Write all of those blood pressures down and record the time that they were taken.  Keep all that information and take it with you to see your Primary Care Physician.  If your blood pressure is consistently above 140/90 you will need to follow up with your PCP more quickly.      - You must understand that you have received an Urgent Care treatment only and that you may be released before all of your medical problems are known or treated.   - You, the patient, will arrange for follow up care as instructed.   - If your condition  worsens or fails to improve we recommend that you receive another evaluation at the ER immediately or contact your PCP to discuss your concerns or return here.   - Follow up with your PCP or specialty clinic as directed in the next 1-2 weeks if not improved or as needed.  You can call [phone] to schedule an appointment with the appropriate provider.        Patient Education       Dizziness, Nonvertigo, Discharge Instructions   About this topic   Dizziness means different things to different people. For example, you may feel light-headed or like you are about to pass out. You may have trouble walking straight and feel like you are about to fall when you are dizzy. Some people feel as though they are spinning or the world around them is spinning. This spinning feeling is known as vertigo.  Many things can cause you to feel dizzy. Some are serious things like heart problems or a stroke. Less serious things, like getting up too quickly or not drinking enough fluids, can also cause you to feel dizzy. Some medicines can also cause you to feel dizzy.  You may feel dizzy for a short time and then it may go away. It may also last for a long time and disrupt your daily activities. Treatment for dizziness depends on the cause.     What care is needed at home?   · Ask your doctor what you need to do when you go home. Make sure you ask questions if you do not understand what the doctor says.  · Avoid changing your position too quickly. Take care when moving from sitting to standing.  · Sit on the edge of the bed for a few minutes before you stand up. Start to walk slowly after you stand up.  · Move your legs often if you need to sit or  one position for a long time.  · Be sure to drink enough fluids, even if you do not feel thirsty.  · Sit or lie down right away if you feel faint or dizzy. Take extra care to protect yourself from falls. Avoid driving when dizzy. If you feel dizzy while driving, pull over right  away.  · If you must walk somewhere when you feel dizzy, you may need to use a cane or walker, or have another person help you so you dont fall.  What follow-up care is needed?   · Your doctor may ask you to make visits to the office to check on your progress. Be sure to keep these visits.  · Your doctor may send you to a doctor who specializes in the brain, nerves, or heart to figure out the cause of your dizziness.  What drugs may be needed?   The doctor may order drugs to:  · Treat the condition that causes dizziness  · Decrease the feeling of dizziness  Will physical activity be limited?   · Avoid activities that may cause dizzy spells.  · Do not drive if you are feeling dizzy.  · Do not use running machines if you are feeling dizzy.  What problems could happen?   · Fall and hurt yourself  · Get in a car accident  What can be done to prevent this health problem?   · Drink lots of fluids each day.  · Have your doctor review your drugs and try to decrease drugs that cause dizziness.  · Use a blood pressure monitor.  · Avoid drinking alcohol or using other drugs  When do I need to call the doctor?   · You have new weakness in your arms or legs.  · You develop new trouble speaking, swallowing, seeing, or hearing.  · You have chest pain, an irregular heartbeat, or trouble breathing.  · You have a seizure.  · You become unable to walk or stand because of your dizziness.  · Your dizziness continues for a long time or gets worse.  Helpful tips   If you are having dizzy spells:  · Sit down in a chair and put your head between your knees.  · Breathe deeply and slowly.  · Stand up slowly.  · Get some fresh air.  Teach Back: Helping You Understand   The Teach Back Method helps you understand the information we are giving you. After you talk with the staff, tell them in your own words what you learned. This helps to make sure the staff has described each thing clearly. It also helps to explain things that may have been  confusing. Before going home, make sure you can do these:  · I can tell you about my condition.  · I can tell you what I will do to help me stay safe when moving about.  · I can tell you what I will do if I have problems talking or moving.  Where can I learn more?   [facility]  http://www.health.[place].nz/your-health/conditions-and-treatments/diseases-and-illnesses/dizziness   [facility] Choices  http://www.nhs.uk/conditions/dizziness/pages/introduction.aspx   Last Reviewed Date   2021-06-10  Consumer Information Use and Disclaimer   This information is not specific medical advice and does not replace information you receive from your health care provider. This is only a brief summary of general information. It does NOT include all information about conditions, illnesses, injuries, tests, procedures, treatments, therapies, discharge instructions or life-style choices that may apply to you. You must talk with your health care provider for complete information about your health and treatment options. This information should not be used to decide whether or not to accept your health care providers advice, instructions or recommendations. Only your health care provider has the knowledge and training to provide advice that is right for you.  Copyright   Copyright © 2021 [employer] and its affiliates and/or licensors. All rights reserved.

## 2022-02-22 ENCOUNTER — CLINICAL SUPPORT (OUTPATIENT)
Dept: REHABILITATION | Facility: HOSPITAL | Age: 82
End: 2022-02-22
Payer: MEDICARE

## 2022-02-22 DIAGNOSIS — R29.898 SHOULDER WEAKNESS: ICD-10-CM

## 2022-02-22 DIAGNOSIS — M25.311 SHOULDER INSTABILITY, RIGHT: Primary | ICD-10-CM

## 2022-02-22 PROCEDURE — 97110 THERAPEUTIC EXERCISES: CPT | Mod: PO

## 2022-02-22 NOTE — PROGRESS NOTES
[facility]   Physical Therapy Treatment Note     Name: Bao Mckeon  Clinic Number: [id number]    Therapy Diagnosis:   Encounter Diagnoses   Name Primary?    Shoulder instability, right Yes    Shoulder weakness      Physician: RADHA Fox MD    Visit Date: 2/22/2022    Physician Orders: PT eval and treat  Medical Diagnosis:   R29.898 (ICD-10-CM) - Shoulder weakness   M25.311 (ICD-10-CM) - Shoulder instability, right         Evaluation Date: 1/5/22  Authorization Period Expiration: 12/14/22  Plan of Care Certification Period: 4/30/22  Progress Note Due: 2/5/22    Visit # / Visits authorized: 2 / 20   FOTO: 1/ 3   PTA Visit #: 1 / 5      Time In: 1055 am  Time Out: 1135am  Total Billable Time: 40 minutes,, TE 2, MT 1     Precautions: Hard of hearing, R shoulder dislocation and self relocation limit excessive extension of R shoulder due to instability.          SUBJECTIVE     Pt reports: had a good day and overdid it so took a day off and resumed exercises easing into routine the following day which got rid of all the pain.    He was compliant with home exercise program.  Response to previous treatment: no issues mild tightness no pain  Functional change: 10-15 minutes piano playing    Pain: 0/10 pre and 0/10 post session.  Location: right shoulder      OBJECTIVE     2/22/22:  Full AROM R shoulder flexion 170 deg and abduction 160 without pain during exercise      TREATMENT     Total Treatment time (time-based codes) separate from Evaluation: 40 minutes     Bao received the treatments listed below:      Bao received therapeutic exercises to develop strength, endurance, ROM and posture for 38 minutes including:     Chest press with 3lb dowel + 3 lb in supine with 30 deg HOB elevation 2 x 10  Supine flys with orange TBand 2 x 10  Chest press with OTB 3 x 10  Sitting flys with OTB 2 x 10  Same position as chest press into in pain free range ( today ) of flexion  Supine  small amplitude flexion  with 1# weight   Scapular retraction 2 x 10 hold 3 seconds  Cervical retraction 2 x 10 hold 3 secs  Scapular retraction x 20 with 2-3 secs hold at the all for proprioceptive cues.  B shld extension YTB 3 x 10 extension as tolerated  B shld row YTB 3 x 10 extenson as tolerated  shld ER unilaterally hold 10 secs x 10    L shoulder adduction 10 x 3 with Orange TB  L shoulder flexion with elbow flexed 10 x 3 with orange TB  +shoulder abduction to tolerance with and without 1# weight pain free zone 2 x 5-10  +shoulder flexion to tolerance with and without 1# weight pain free zone 2 x 5-10    Shld ER YTB unilateral reciprocal 3 x 10   + scapular stabilization in standing CW and CCW 2 x 10 or to tolerance.     Supine vs standing at wall scaption  3 x 10 attempted both position without issues  sidelying vs standing at wall abduction 3 x10 NP       Levator/upper trap stretch 10 x 1-2 secs with strap     Bao received the following manual therapy techniques: Myofacial release and Soft tissue Mobilization were applied to the: levator, supraspinatus, teres minor, pectoralis minor, subclavius for 2 minutes, including:    Cervical traction  Posterior capsular grade III mob AP with distraction  Manually resisted R shoulder internal and external rotation 10 x 3 mid range     Bao received hot pack for 0 minutes to R shoulder neck after session.      PATIENT EDUCATION AND HOME EXERCISES     Home Exercises Provided and Patient Education Provided     Education provided:   - HEP, pain management     Written Home Exercises Provided: yes.  Exercises were reviewed and Bao was able to demonstrate them prior to the end of the session.  Bao demonstrated good  understanding of the education provided.      See EMR under per handout for exercises provided per handout 1/5/2022.    ASSESSMENT     Patient with min cues for levator stretch with use of strap so allowed to perform without strap. Pt limited  to HEP with good management after overuse going back to 1 set and full day of rest to allow recovery so independent management.     Bao Is progressing well towards his goals.   Pt prognosis is Good.     Pt will continue to benefit from skilled outpatient physical therapy to address the deficits listed in the problem list box on initial evaluation, provide pt/family education and to maximize pt's level of independence in the home and community environment.     Pt's spiritual, cultural and educational needs considered and pt agreeable to plan of care and goals.     Anticipated barriers to physical therapy: R shoulder instability due to dislocation    Goals:   Short Term Goals: 3 weeks   1. Pt to be Independent with HEP for increased strength, endurance and functional mobility. MET 2/15/22  2. Pt to have decreased pain 0/10 after session for increased functional mobility and tolerance. MET 2/1 /22  3. Pt to increase AROM to 150 degrees R shoulder flexion for increased functional mobility. MET 2/15/22        Long Term Goals: 10 weeks   1. Pt to be Independent with pain management strategies and verbalize 2 for increased strength, endurance and functional mobility. MET 2/15/22  2. Pt to have decreased pain 0/10 over 50% of the day for increased functional mobility and tolerance. MET 2/22/22  3. Pt to increase AROM to 150 degrees abduction of R shoulder for increased functional mobility. MET 2/2/22  4. Pt to increase activity tolerance to 1 hrs house cleaning or cooking for without increased pain for increased overall functional activity.  5. Pt to increased R shoulder strength into flexion and abduction through available AROM to 4/5.  6. Pt to tolerate playing his piano 1 hr total with 2 session of 30 minutes without pain or compensation to return to premorbid level.      PLAN     Cont with CORINA Loaiza, PT

## 2022-02-24 ENCOUNTER — CLINICAL SUPPORT (OUTPATIENT)
Dept: REHABILITATION | Facility: HOSPITAL | Age: 82
End: 2022-02-24
Payer: MEDICARE

## 2022-02-24 DIAGNOSIS — M25.311 SHOULDER INSTABILITY, RIGHT: Primary | ICD-10-CM

## 2022-02-24 DIAGNOSIS — R29.898 SHOULDER WEAKNESS: ICD-10-CM

## 2022-02-24 PROCEDURE — 97110 THERAPEUTIC EXERCISES: CPT | Mod: PO

## 2022-02-24 PROCEDURE — 97140 MANUAL THERAPY 1/> REGIONS: CPT | Mod: PO

## 2022-02-24 NOTE — PROGRESS NOTES
[facility]   Physical Therapy Treatment Note     Name: Bao Mckeon  Clinic Number: [id number]    Therapy Diagnosis:   Encounter Diagnoses   Name Primary?    Shoulder instability, right Yes    Shoulder weakness      Physician: RADHA Fox MD    Visit Date: 2/24/2022    Physician Orders: PT eval and treat  Medical Diagnosis:   R29.898 (ICD-10-CM) - Shoulder weakness   M25.311 (ICD-10-CM) - Shoulder instability, right         Evaluation Date: 1/5/22  Authorization Period Expiration: 12/14/22  Plan of Care Certification Period: 4/30/22  Progress Note Due: 2/5/22    Visit # / Visits authorized: 2 / 20   FOTO: 1/ 3   PTA Visit #: 1 / 5      Time In: 1105  am  Time Out: 1145  am  Total Billable Time: 40 minutes,, TE 2, MT 1     Precautions: Hard of hearing, R shoulder dislocation and self relocation limit excessive extension of R shoulder due to instability.          SUBJECTIVE     Pt reports: had a good day and overdid it so took a day off and resumed exercises easing into routine the following day which got rid of all the pain.    He was compliant with home exercise program.  Response to previous treatment: no issues mild tightness no pain  Functional change: 10-15 minutes piano playing    Pain: 0/10 pre and 0/10 post session.  Location: right shoulder      OBJECTIVE     2/22/22:  Full AROM R shoulder flexion 170 deg and abduction 160 without pain during exercise      TREATMENT     Total Treatment time (time-based codes) separate from Evaluation: 40 minutes     Bao received the treatments listed below:      Bao received therapeutic exercises to develop strength, endurance, ROM and posture for 28 minutes including:     Chest press with 3lb dowel + 3 lb in supine with 30 deg HOB elevation 2 x 10  Supine flys with orange TBand 2 x 10  Chest press with OTB 3 x 10  Sitting flys with OTB 2 x 10  Same position as chest press into in pain free range ( today ) of  flexion  Supine small amplitude flexion  with 1# weight   Scapular retraction 2 x 10 hold 3 seconds  Cervical retraction 2 x 10 hold 3 secs  Scapular retraction x 20 with 2-3 secs hold at the all for proprioceptive cues.  B shld extension YTB 3 x 10 extension as tolerated  B shld row YTB 3 x 10 extenson as tolerated  shld ER unilaterally hold 10 secs x 10    L shoulder adduction 10 x 3 with Orange TB  L shoulder flexion with elbow flexed 10 x 3 with orange TB  +shoulder abduction to tolerance with and without 1# weight pain free zone 2 x 5-10  +shoulder flexion to tolerance with and without 1# weight pain free zone 2 x 5-10    Shld ER YTB unilateral reciprocal 3 x 10   + scapular stabilization in standing CW and CCW 2 x 10 or to tolerance.     Supine vs standing at wall scaption  3 x 10 attempted both position without issues  sidelying vs standing at wall abduction 3 x10 NP       Levator/upper trap stretch 10 x 1-2 secs with strap     Bao received the following manual therapy techniques: Myofacial release and Soft tissue Mobilization were applied to the: levator, supraspinatus, teres minor, pectoralis minor, subclavius for 12 minutes, including:    Cervical traction  Posterior capsular grade III mob AP with distraction  Manually resisted R shoulder internal and external rotation 10 x 3 mid range     Bao received hot pack for 0 minutes to R shoulder neck after session.      PATIENT EDUCATION AND HOME EXERCISES     Home Exercises Provided and Patient Education Provided     Education provided:   - HEP, pain management     Written Home Exercises Provided: yes.  Exercises were reviewed and Bao was able to demonstrate them prior to the end of the session.  Bao demonstrated good  understanding of the education provided.      See EMR under per handout for exercises provided per handout 1/5/2022.    ASSESSMENT     Patient with independent management of pain with overuse during piano playing but able to  rest and reduce exercise reps and resistance and get back on track with exercise. Pt continues to progress overall with full pain free range of motion. Pt education on return to continuous piano playing to tolerance.      Bao Is progressing well towards his goals.   Pt prognosis is Good.     Pt will continue to benefit from skilled outpatient physical therapy to address the deficits listed in the problem list box on initial evaluation, provide pt/family education and to maximize pt's level of independence in the home and community environment.     Pt's spiritual, cultural and educational needs considered and pt agreeable to plan of care and goals.     Anticipated barriers to physical therapy: R shoulder instability due to dislocation    Goals:   Short Term Goals: 3 weeks   1. Pt to be Independent with HEP for increased strength, endurance and functional mobility. MET 2/15/22  2. Pt to have decreased pain 0/10 after session for increased functional mobility and tolerance. MET 2/1 /22  3. Pt to increase AROM to 150 degrees R shoulder flexion for increased functional mobility. MET 2/15/22        Long Term Goals: 10 weeks   1. Pt to be Independent with pain management strategies and verbalize 2 for increased strength, endurance and functional mobility. MET 2/15/22  2. Pt to have decreased pain 0/10 over 50% of the day for increased functional mobility and tolerance. MET 2/22/22  3. Pt to increase AROM to 150 degrees abduction of R shoulder for increased functional mobility. MET 2/2/22  4. Pt to increase activity tolerance to 1 hrs house cleaning or cooking for without increased pain for increased overall functional activity.  5. Pt to increased R shoulder strength into flexion and abduction through available AROM to 4/5.  6. Pt to tolerate playing his piano 1 hr total with 2 session of 30 minutes without pain or compensation to return to premorbid level. Progressing 2/24/22      PLAN     Cont with CORINA Salguero  Fadia, HARVEY

## 2022-03-02 ENCOUNTER — CLINICAL SUPPORT (OUTPATIENT)
Dept: REHABILITATION | Facility: HOSPITAL | Age: 82
End: 2022-03-02
Attending: ORTHOPAEDIC SURGERY
Payer: MEDICARE

## 2022-03-02 DIAGNOSIS — M25.311 SHOULDER INSTABILITY, RIGHT: Primary | ICD-10-CM

## 2022-03-02 DIAGNOSIS — R29.898 SHOULDER WEAKNESS: ICD-10-CM

## 2022-03-02 PROCEDURE — 97530 THERAPEUTIC ACTIVITIES: CPT | Mod: PO

## 2022-03-02 PROCEDURE — 97110 THERAPEUTIC EXERCISES: CPT | Mod: PO

## 2022-03-02 NOTE — PROGRESS NOTES
[facility]   Physical Therapy Treatment Note     Name: Bao Mckeon  Clinic Number: [id number]    Therapy Diagnosis:   Encounter Diagnoses   Name Primary?    Shoulder instability, right Yes    Shoulder weakness      Physician: RADHA Fox MD    Visit Date: 3/2/2022    Physician Orders: PT eval and treat  Medical Diagnosis:   R29.898 (ICD-10-CM) - Shoulder weakness   M25.311 (ICD-10-CM) - Shoulder instability, right         Evaluation Date: 1/5/22  Authorization Period Expiration: 12/14/22  Plan of Care Certification Period: 4/30/22  Progress Note Due: 2/5/22    Visit # / Visits authorized: 11 / 20   FOTO: 1/ 3   PTA Visit #: 1 / 5      Time In: 925  am  Time Out: 1010  am  Total Billable Time: 45 minutes,, TE 2, TA 1     Precautions: Hard of hearing, R shoulder dislocation and self relocation limit excessive extension of R shoulder due to instability.          SUBJECTIVE     Pt reports: that he woke up with less pain than he usually has in the morning.  He reported that he attributed this to being very carefull with his R arm with driving and moving the arm yesterday.    He was compliant with home exercise program.  Response to previous treatment: no issues mild tightness no pain  Functional change: 10-15 minutes piano playing    Pain: 0/10 pre and 0/10 post session.  Location: right shoulder      OBJECTIVE     No objective measures taken today.      TREATMENT     Total Treatment time (time-based codes) separate from Evaluation: 40 minutes     aBo received the treatments listed below:      Bao received therapeutic exercises to develop strength, endurance, ROM and posture for 25 minutes including:     Chest press with 3lb dowel + 3 lb in supine with 30 deg HOB elevation 2 x 10  Supine flys with orange TBand 2 x 10  Chest press with OTB 3 x 10  Sitting flys with OTB 2 x 10  Same position as chest press into in pain free range ( today ) of flexion  Supine small  amplitude flexion  with 1# weight   Scapular retraction 2 x 10 hold 3 seconds  Cervical retraction 2 x 10 hold 3 secs  Scapular retraction x 20 with 2-3 secs hold at the all for proprioceptive cues.  B shld extension YTB 3 x 10 extension as tolerated  B shld row YTB 3 x 10 extenson as tolerated  Standing R shoulder flexion 10 x 2 with 1/2#  Standing R shoulder abduction 10 x 2 with 1/2#  shld ER unilaterally hold 10 secs x 10    L shoulder adduction 10 x 3 with Orange TB  L shoulder flexion with elbow flexed 10 x 3 with orange TB  +shoulder abduction to tolerance with and without 1# weight pain free zone 2 x 5-10  +shoulder flexion to tolerance with and without 1# weight pain free zone 2 x 5-10    Shld ER YTB unilateral reciprocal 3 x 10   + scapular stabilization in standing CW and CCW 2 x 10 or to tolerance.     Supine vs standing at wall scaption  3 x 10 attempted both position without issues  sidelying vs standing at wall abduction 3 x10 NP    Levator/upper trap stretch 10 x 1-2 secs with strap     Bao received the following manual therapy techniques: Myofacial release and Soft tissue Mobilization were applied to the: levator, supraspinatus, teres minor, pectoralis minor, subclavius for 00 minutes, including:    Cervical traction  Posterior capsular grade III mob AP with distraction  Manually resisted R shoulder internal and external rotation 10 x 3 mid range     Bao received therapeutic activities x 15 min including: discussion of R shoulder Sx and exercises:  Pt was concerned about exercising when his R UE muscles were sore.  We discussed his Sx with ADL's and exercise and the importance of maintaining/improving his R shoulder ROM and progressing R UE strength for improved R UE function.    Bao received hot pack for 0 minutes to R shoulder neck after session.      PATIENT EDUCATION AND HOME EXERCISES     Home Exercises Provided and Patient Education Provided     Education provided:   - HEP,  pain management     Written Home Exercises Provided: yes.  Exercises were reviewed and Bao was able to demonstrate them prior to the end of the session.  Bao demonstrated good  understanding of the education provided.      See EMR under per handout for exercises provided per handout 1/5/2022.    ASSESSMENT     Patient reported that his R shoulder is feeling better today than it has in a while.  He appears to understand why he might be encouraged to use his R UE even at times when his R shoulder is sore to improve strength and endurance to improve his functional abilities.  Pt tolerated Tx well today.   Bao Is progressing well towards his goals.   Pt prognosis is Good.     Pt will continue to benefit from skilled outpatient physical therapy to address the deficits listed in the problem list box on initial evaluation, provide pt/family education and to maximize pt's level of independence in the home and community environment.     Pt's spiritual, cultural and educational needs considered and pt agreeable to plan of care and goals.     Anticipated barriers to physical therapy: R shoulder instability due to dislocation    Goals:   Short Term Goals: 3 weeks   1. Pt to be Independent with HEP for increased strength, endurance and functional mobility. MET 2/15/22  2. Pt to have decreased pain 0/10 after session for increased functional mobility and tolerance. MET 2/1 /22  3. Pt to increase AROM to 150 degrees R shoulder flexion for increased functional mobility. MET 2/15/22        Long Term Goals: 10 weeks   1. Pt to be Independent with pain management strategies and verbalize 2 for increased strength, endurance and functional mobility. MET 2/15/22  2. Pt to have decreased pain 0/10 over 50% of the day for increased functional mobility and tolerance. MET 2/22/22  3. Pt to increase AROM to 150 degrees abduction of R shoulder for increased functional mobility. MET 2/2/22  4. Pt to increase activity tolerance to 1  hrs house cleaning or cooking for without increased pain for increased overall functional activity.  5. Pt to increased R shoulder strength into flexion and abduction through available AROM to 4/5.  6. Pt to tolerate playing his piano 1 hr total with 2 session of 30 minutes without pain or compensation to return to premorbid level. Progressing 2/24/22      PLAN     Cont with POC    Alfred Vasquez, PT

## 2022-03-04 ENCOUNTER — CLINICAL SUPPORT (OUTPATIENT)
Dept: REHABILITATION | Facility: HOSPITAL | Age: 82
End: 2022-03-04
Attending: ORTHOPAEDIC SURGERY
Payer: MEDICARE

## 2022-03-04 DIAGNOSIS — M25.311 SHOULDER INSTABILITY, RIGHT: Primary | ICD-10-CM

## 2022-03-04 DIAGNOSIS — R29.898 SHOULDER WEAKNESS: ICD-10-CM

## 2022-03-04 PROCEDURE — 97110 THERAPEUTIC EXERCISES: CPT | Mod: PO

## 2022-03-04 NOTE — PROGRESS NOTES
[facility]   Physical Therapy Treatment Note     Name: Bao Mckeon  Clinic Number: [id number]    Therapy Diagnosis:   Encounter Diagnoses   Name Primary?    Shoulder instability, right Yes    Shoulder weakness      Physician: RADHA Fox MD    Visit Date: 3/4/2022    Physician Orders: PT eval and treat  Medical Diagnosis:   R29.898 (ICD-10-CM) - Shoulder weakness   M25.311 (ICD-10-CM) - Shoulder instability, right         Evaluation Date: 1/5/22  Authorization Period Expiration: 12/14/22  Plan of Care Certification Period: 4/30/22  Progress Note Due: 2/5/22    Visit # / Visits authorized: 12 / 20   FOTO: 1/ 3   PTA Visit #: 1 / 5      Time In: 920  am  Time Out: 10:00  am  Total Billable Time: 45 minutes,, TE 3     Precautions: Hard of hearing, R shoulder dislocation and self relocation limit excessive extension of R shoulder due to instability.          SUBJECTIVE     Pt reports: that his R shoulder is feeling better today and he is thinking bout discharging to a HEP.    He was compliant with home exercise program.  Response to previous treatment: no issues mild tightness no pain  Functional change: 10-15 minutes piano playing    Pain: 0/10 pre and 0/10 post session.  Location: right shoulder      OBJECTIVE     No objective measures taken today.      TREATMENT     Total Treatment time (time-based codes) separate from Evaluation: 40 minutes     Bao received the treatments listed below:      Bao received therapeutic exercises to develop strength, endurance, ROM and posture for 40 minutes including:    B shoulder pro/retraction 20 x 3 with a ball on the plinth   Chest press with 3# in each hand in supine 3 x 10  Supine flys with orange TBand 2 x 10  Chest press with OTB 3 x 10  Sitting flys with OTB 2 x 10  Same position as chest press into in pain free range ( today ) of flexion  Supine small amplitude flexion  with 1# weight   Scapular retraction 2 x 10  hold 3 seconds  Cervical retraction 2 x 10 hold 3 secs  Scapular retraction x 20 with 2-3 secs hold at the all for proprioceptive cues.  B shld extension [place] TB 3 x 10 extension as tolerated  B shld row [place] TB 3 x 10 extenson as tolerated  Standing R shoulder flexion 10 x 2 with 1#  Standing R shoulder abduction 10 x 2 with 1#  shld ER unilaterally hold 10 secs x 10    L shoulder adduction 10 x 3 with Orange TB  L shoulder flexion with elbow flexed 10 x 3 with orange TB  +shoulder abduction to tolerance with and without 1# weight pain free zone 2 x 5-10  +shoulder flexion to tolerance with and without 1# weight pain free zone 2 x 5-10    Shld ER YTB unilateral reciprocal 3 x 10   + scapular stabilization in standing CW and CCW 2 x 10 or to tolerance.     Supine vs standing at wall scaption  3 x 10 attempted both position without issues  sidelying vs standing at wall abduction 3 x10 NP    Levator/upper trap stretch 10 x 1-2 secs with strap     Bao received the following manual therapy techniques: Myofacial release and Soft tissue Mobilization were applied to the: levator, supraspinatus, teres minor, pectoralis minor, subclavius for 00 minutes, including:    Cervical traction  Posterior capsular grade III mob AP with distraction  Manually resisted R shoulder internal and external rotation 10 x 3 mid range     Bao received therapeutic activities x 15 min including: discussion of R shoulder Sx and exercises:  Pt was concerned about exercising when his R UE muscles were sore.  We discussed his Sx with ADL's and exercise and the importance of maintaining/improving his R shoulder ROM and progressing R UE strength for improved R UE function.    Bao received hot pack for 0 minutes to R shoulder neck after session.      PATIENT EDUCATION AND HOME EXERCISES     Home Exercises Provided and Patient Education Provided     Education provided:   - HEP, pain management     Written Home Exercises Provided:  yes.  Exercises were reviewed and Bao was able to demonstrate them prior to the end of the session.  Bao demonstrated good  understanding of the education provided.      See EMR under per handout for exercises provided per handout 1/5/2022.    ASSESSMENT     Patient reported that his R shoulder pain remains under control today.  He attempted increased resistance with standing B shoulder flexion and abduction.  He did fine with flexion with 1# in each hand, but had some difficulty with the first few repetitions of abduction on the R hiking the shoulder at mid range of motion.  He reported some discomfort with R shoulder abduction during the first two repetitions oaf the first set.  Pt tolerated all other Tx activities well.  Bao Is progressing well towards his goals.   Pt prognosis is Good.     Pt will continue to benefit from skilled outpatient physical therapy to address the deficits listed in the problem list box on initial evaluation, provide pt/family education and to maximize pt's level of independence in the home and community environment.     Pt's spiritual, cultural and educational needs considered and pt agreeable to plan of care and goals.     Anticipated barriers to physical therapy: R shoulder instability due to dislocation    Goals:   Short Term Goals: 3 weeks   1. Pt to be Independent with HEP for increased strength, endurance and functional mobility. MET 2/15/22  2. Pt to have decreased pain 0/10 after session for increased functional mobility and tolerance. MET 2/1 /22  3. Pt to increase AROM to 150 degrees R shoulder flexion for increased functional mobility. MET 2/15/22        Long Term Goals: 10 weeks   1. Pt to be Independent with pain management strategies and verbalize 2 for increased strength, endurance and functional mobility. MET 2/15/22  2. Pt to have decreased pain 0/10 over 50% of the day for increased functional mobility and tolerance. MET 2/22/22  3. Pt to increase AROM to  150 degrees abduction of R shoulder for increased functional mobility. MET 2/2/22  4. Pt to increase activity tolerance to 1 hrs house cleaning or cooking for without increased pain for increased overall functional activity.  5. Pt to increased R shoulder strength into flexion and abduction through available AROM to 4/5.  6. Pt to tolerate playing his piano 1 hr total with 2 session of 30 minutes without pain or compensation to return to premorbid level. Progressing 2/24/22      PLAN     Cont with POC    Alfred Vasquez, PT

## 2022-03-08 ENCOUNTER — CLINICAL SUPPORT (OUTPATIENT)
Dept: REHABILITATION | Facility: HOSPITAL | Age: 82
End: 2022-03-08
Attending: ORTHOPAEDIC SURGERY
Payer: MEDICARE

## 2022-03-08 DIAGNOSIS — M25.311 SHOULDER INSTABILITY, RIGHT: Primary | ICD-10-CM

## 2022-03-08 DIAGNOSIS — R29.898 SHOULDER WEAKNESS: ICD-10-CM

## 2022-03-08 PROCEDURE — 97110 THERAPEUTIC EXERCISES: CPT | Mod: PO

## 2022-03-08 PROCEDURE — 97140 MANUAL THERAPY 1/> REGIONS: CPT | Mod: PO

## 2022-03-08 NOTE — PROGRESS NOTES
[facility]   Physical Therapy Treatment Note     Name: Bao Mckeon  Clinic Number: [id number]    Therapy Diagnosis:   Encounter Diagnoses   Name Primary?    Shoulder instability, right Yes    Shoulder weakness      Physician: RADHA Fox MD    Visit Date: 3/8/2022    Physician Orders: PT eval and treat  Medical Diagnosis:   R29.898 (ICD-10-CM) - Shoulder weakness   M25.311 (ICD-10-CM) - Shoulder instability, right         Evaluation Date: 1/5/22  Authorization Period Expiration: 12/14/22  Plan of Care Certification Period: 4/30/22  Progress Note Due: 2/5/22    Visit # / Visits authorized: 12 / 20   FOTO: 1/ 3   PTA Visit #: 1 / 5      Time In: 935  am  Time Out: 10:15  am  Total Billable Time: 40 minutes,, TE 3     Precautions: Hard of hearing, R shoulder dislocation and self relocation limit excessive extension of R shoulder due to instability.          SUBJECTIVE     Pt reports: cleaning and using arm for over an hour and to include 1 hour of piano playing daily. Pt reported ready for discharge soon.     He was compliant with home exercise program.  Response to previous treatment: no issues mild tightness no pain  Functional change: 45-60 minutes piano playing daily and activities with BUEs    Pain: 0/10 pre and 0/10 post session.  Location: right shoulder      OBJECTIVE   3/8/22  R full AROM of shoulder into flexion, extension, IR, ER and combined motion with 4+/5 in all planes except ER and abduction end range at 4/5      TREATMENT     Total Treatment time (time-based codes) separate from Evaluation: 40 minutes     Bao received the treatments listed below:      Bao received therapeutic exercises to develop strength, endurance, ROM and posture for 30 minutes including:     Full review of HEP as given on paper    B shoulder pro/retraction 20 x 3 with a ball on the plinth   Chest press with 3# in each hand in supine 3 x 10  Supine flys with orange TBand 2 x 10  Chest  press with OTB 3 x 10  Sitting flys with OTB 2 x 10  Same position as chest press into in pain free range ( today ) of flexion  Supine small amplitude flexion  with 1# weight   Scapular retraction 2 x 10 hold 3 seconds  Cervical retraction 2 x 10 hold 3 secs  Scapular retraction x 20 with 2-3 secs hold at the all for proprioceptive cues.  B shld extension [place] TB 3 x 10 extension as tolerated  B shld row [place] TB 3 x 10 extenson as tolerated  Standing R shoulder flexion 10 x 2 with 1#  Standing R shoulder abduction 10 x 2 with 1#  shld ER unilaterally hold 10 secs x 10    L shoulder adduction 10 x 3 with Orange TB  L shoulder flexion with elbow flexed 10 x 3 with orange TB  +shoulder abduction to tolerance with and without 1# weight pain free zone 2 x 5-10  +shoulder flexion to tolerance with and without 1# weight pain free zone 2 x 5-10    Shld ER YTB unilateral reciprocal 3 x 10   + scapular stabilization in standing CW and CCW 2 x 10 or to tolerance.     Supine vs standing at wall scaption  3 x 10 attempted both position without issues  sidelying vs standing at wall abduction 3 x10 NP    Levator/upper trap stretch 10 x 1-2 secs with strap     Bao received the following manual therapy techniques: Myofacial release and Soft tissue Mobilization were applied to the: levator, supraspinatus, teres minor, pectoralis minor, subclavius for 10 minutes, including:    Cervical traction  Posterior capsular grade III mob AP with distraction  Manually resisted R shoulder internal and external rotation 10 x 3 mid range and end ROM flexion, abduction and extension    Bao received therapeutic activities x 0 min including: discussion of R shoulder Sx and exercises:  Pt was concerned about exercising when his R UE muscles were sore.  We discussed his Sx with ADL's and exercise and the importance of maintaining/improving his R shoulder ROM and progressing R UE strength for improved R UE function.    Bao  received hot pack for 0 minutes to R shoulder neck after session.      PATIENT EDUCATION AND HOME EXERCISES     Home Exercises Provided and Patient Education Provided     Education provided:   - HEP, pain management     Written Home Exercises Provided: yes.  Exercises were reviewed and Bao was able to demonstrate them prior to the end of the session.  Bao demonstrated good  understanding of the education provided.      See EMR under per handout for exercises provided per handout 1/5/2022.    ASSESSMENT     Patient completed HEP without issues with cues for increased resistance and tolerated manual muscle testing with end range of motion without issues. Pt reported good insight with continued HEP and was happy with outcome and ready for discharge.    Bao Is progressing well towards his goals.   Pt prognosis is Good.     Pt will continue to benefit from skilled outpatient physical therapy to address the deficits listed in the problem list box on initial evaluation, provide pt/family education and to maximize pt's level of independence in the home and community environment.     Pt's spiritual, cultural and educational needs considered and pt agreeable to plan of care and goals.     Anticipated barriers to physical therapy: R shoulder instability due to dislocation    Goals:   Short Term Goals: 3 weeks   1. Pt to be Independent with HEP for increased strength, endurance and functional mobility. MET 2/15/22  2. Pt to have decreased pain 0/10 after session for increased functional mobility and tolerance. MET 2/1 /22  3. Pt to increase AROM to 150 degrees R shoulder flexion for increased functional mobility. MET 2/15/22        Long Term Goals: 10 weeks   1. Pt to be Independent with pain management strategies and verbalize 2 for increased strength, endurance and functional mobility. MET 2/15/22  2. Pt to have decreased pain 0/10 over 50% of the day for increased functional mobility and tolerance. MET  2/22/22  3. Pt to increase AROM to 150 degrees abduction of R shoulder for increased functional mobility. MET 2/2/22  4. Pt to increase activity tolerance to 1 hrs house cleaning or cooking for without increased pain for increased overall functional activity. MET 3/8/22  5. Pt to increased R shoulder strength into flexion and abduction through available AROM to 4/5. MET 3/8/22  6. Pt to tolerate playing his piano 1 hr total with 2 session of 30 minutes without pain or compensation to return to premorbid level. MET 3/8/22      PLAN     Cont with CORINA Loaiza, PT

## 2022-03-15 ENCOUNTER — DOCUMENTATION ONLY (OUTPATIENT)
Dept: REHABILITATION | Facility: HOSPITAL | Age: 82
End: 2022-03-15
Payer: MEDICARE

## 2022-05-11 ENCOUNTER — OFFICE VISIT (OUTPATIENT)
Dept: URGENT CARE | Facility: CLINIC | Age: 82
End: 2022-05-11
Payer: MEDICARE

## 2022-05-11 VITALS
TEMPERATURE: 99 F | HEART RATE: 54 BPM | HEIGHT: 65 IN | OXYGEN SATURATION: 98 % | RESPIRATION RATE: 20 BRPM | BODY MASS INDEX: 22.99 KG/M2 | WEIGHT: 138 LBS | DIASTOLIC BLOOD PRESSURE: 80 MMHG | SYSTOLIC BLOOD PRESSURE: 130 MMHG

## 2022-05-11 DIAGNOSIS — R03.0 ELEVATED BLOOD-PRESSURE READING, WITHOUT DIAGNOSIS OF HYPERTENSION: ICD-10-CM

## 2022-05-11 DIAGNOSIS — Z11.52 ENCOUNTER FOR SCREENING FOR COVID-19: ICD-10-CM

## 2022-05-11 DIAGNOSIS — J02.9 SORE THROAT: Primary | ICD-10-CM

## 2022-05-11 LAB
CTP QC/QA: YES
SARS-COV-2 RDRP RESP QL NAA+PROBE: NEGATIVE

## 2022-05-11 PROCEDURE — U0002: ICD-10-PCS | Mod: QW,S$GLB,, | Performed by: PHYSICIAN ASSISTANT

## 2022-05-11 PROCEDURE — U0002 COVID-19 LAB TEST NON-CDC: HCPCS | Mod: QW,S$GLB,, | Performed by: PHYSICIAN ASSISTANT

## 2022-05-11 PROCEDURE — 3075F SYST BP GE 130 - 139MM HG: CPT | Mod: CPTII,S$GLB,, | Performed by: PHYSICIAN ASSISTANT

## 2022-05-11 PROCEDURE — 3079F DIAST BP 80-89 MM HG: CPT | Mod: CPTII,S$GLB,, | Performed by: PHYSICIAN ASSISTANT

## 2022-05-11 PROCEDURE — 3079F PR MOST RECENT DIASTOLIC BLOOD PRESSURE 80-89 MM HG: ICD-10-PCS | Mod: CPTII,S$GLB,, | Performed by: PHYSICIAN ASSISTANT

## 2022-05-11 PROCEDURE — 1126F PR PAIN SEVERITY QUANTIFIED, NO PAIN PRESENT: ICD-10-PCS | Mod: CPTII,S$GLB,, | Performed by: PHYSICIAN ASSISTANT

## 2022-05-11 PROCEDURE — 99214 PR OFFICE/OUTPT VISIT, EST, LEVL IV, 30-39 MIN: ICD-10-PCS | Mod: S$GLB,,, | Performed by: PHYSICIAN ASSISTANT

## 2022-05-11 PROCEDURE — 1159F MED LIST DOCD IN RCRD: CPT | Mod: CPTII,S$GLB,, | Performed by: PHYSICIAN ASSISTANT

## 2022-05-11 PROCEDURE — 1126F AMNT PAIN NOTED NONE PRSNT: CPT | Mod: CPTII,S$GLB,, | Performed by: PHYSICIAN ASSISTANT

## 2022-05-11 PROCEDURE — 3075F PR MOST RECENT SYSTOLIC BLOOD PRESS GE 130-139MM HG: ICD-10-PCS | Mod: CPTII,S$GLB,, | Performed by: PHYSICIAN ASSISTANT

## 2022-05-11 PROCEDURE — 1159F PR MEDICATION LIST DOCUMENTED IN MEDICAL RECORD: ICD-10-PCS | Mod: CPTII,S$GLB,, | Performed by: PHYSICIAN ASSISTANT

## 2022-05-11 PROCEDURE — 99214 OFFICE O/P EST MOD 30 MIN: CPT | Mod: S$GLB,,, | Performed by: PHYSICIAN ASSISTANT

## 2022-05-11 NOTE — PATIENT INSTRUCTIONS
PLEASE READ YOUR DISCHARGE INSTRUCTIONS ENTIRELY AS IT CONTAINS IMPORTANT INFORMATION.    Patient had covid testing done today.      If Negative and no direct exposure: symptom free without fever reducing meds in 24 hours - can go back to work in 24 hours with surgical mask for 14 days.    Discussed corona virus precautions and reviewed CDC FAC; printed a copy for patient.  I discussed to continue to monitor their symptoms. Discussed that if their symptoms persist or worsen to seek re-evaluation. Clinic vs. ER precautions were given.  Patient verbalized understanding and agreed with the above plan of care.    - Rest.  Drink plenty of fluids.    - Tylenol/anti-inflammatory(ibuprofen/motrin/aleve) as directed as needed for fever/pain.  For Tylenol, do not exceed 3000 mg/ day. If no contraindication.  -OK to supplement with OTC DayQuil, NyQuil or TheraFlu every 6 hours.  Use caution of total amount of Tylenol/acetaminophen per day.  - Reviewed radiographs and all diagnostic testing with patient/family.      -Below are suggestions for symptomatic relief:              -Salt water gargles to soothe throat pain.              -Chloroseptic spray also helps to numb throat pain.              -Nasal saline spray reduces inflammation and dryness.              -Warm face compresses to help with facial sinus pain/pressure.              -Vicks vapor rub at night.              -Flonase OTC or Nasacort OTC  once a day for nasal/sinus congestion. DON'T USE IF YOU HAVE GLAUCOMA. CHECK WITH YOUR PHARMACIST/PHYSICIAN.              -Simple foods like chicken noodle soup.              -Mucinex DM (ANY COUGH EXPECTORANT) for cough or chest congestion with mucus during the day time. Delsym or robitussin (ANY COUGH SUPPRESSANT) helps with coughing at night. Mucinex-D if you have chest congestion or sputum (caution if history of high blood pressure or palpitations).              -Zyrtec/Claritin/xyzal during the day time  & Benadryl at  night (only if severe runny nose) may help with allergies and runny nose. Add decongestant if you have nasal/sinus congestion/sinus pressure/ear fullness sensation. (see below)    Caution with use of Decongestant meds:  -If you DO NOT have Hypertension or any history of palpitations, it is ok to take over the counter Sudafed or Mucinex D or Allegra-D or Claritin-D or Zyrtec-D.  -If you do take one of the above, it is ok to combine that with plain over the counter Mucinex or Allegra or Claritin or Zyrtec. If, for example, you are taking Zyrtec -D, you can combine that with Mucinex, but not Mucinex-D.  If you are taking Mucinex-D, you can combine that with plain Allegra or Claritin or Zyrtec.     -Do not combine pseudophed or phenylephrine with any other brand allergy-D for DECONGESTANT.   -Or vice versa, you can you take plain allergy medications (allegra/claritin/zyrtec with NO Decongestant) and ADD OTC pseudophed or phenelyphrine 2-3 times a day. Avoid taking decongestant late at night or with caffeine as it can keep you up or cause jittery feeling.    -If you DO have Hypertension , anxiety, or palpitations, it is safe to take Coricidin HBP for relief of sinus symptoms.      For your GI symptoms:  -Use gatorade/pedialyte or rehydration packets to help stay hydrated. Vitamin water and plain water do not contain rehydrating electrolytes.  -Increase clear liquids (water, gatorade, pedialyte, broths, jello, etc) Hold off on solids for 12-18 hours. Then advance to BRAT diet (banana, rice, applesauce, tea, toast/crackers), then advance further as tolerated. Avoid spicy or fatty foods.   -May take Imitrol OTC as needed for nausea.   -Use Peptobismol or Immodium to help alleviate your diarrhea symptoms.   -Take mylanta or simethicone for bloating or gas pain.   -Take pepcid or omeprazole if you have heartburn or reflux sensation.  -Avoid imodium unless you have more than 6 loose stools in 24 hours. Take 1 dose and monitor  to see if you can repeat AS IT WILL CAUSE CONSTIPATION.  -Wash hands frequently while sick. Avoid ibuprofen or other NSAIDS until you are well.   -Please go to the ER if you experience worsening abdominal pain, blood in your vomit or stool, high fever, dizziness, fainting, swelling of your abdomen, inability to pass gas or stool, or inability to urinate.         -You must understand that you've received an Urgent Care treatment only and that you may be released before all your medical problems are known or treated. You, the patient, will arrange for follow up care as instructed. Please arrange follow up with your primary medical clinic within 2-5 days if your signs and symptoms have not resolved or worsen.     - Follow up with your PCP or specialty clinic as directed.  You can call [phone] or [phone] to schedule an appointment with the appropriate provider.    - If your condition worsens or fails to improve we recommend that you receive another evaluation at the emergency room immediately or contact your primary medical clinic to discuss your concerns.              Prevention steps for patients with confirmed or suspected COVID-19  Stay home and stay away from family members and friends. The CDC says, you can leave home after these three things have happened: 1) You have had no fever for at least 24 hours (that is one full day of no fever without the use of medicine that reduces fevers) 2) AND other symptoms have improved (for example, when your cough or shortness of breath have improved) 3) AND at least 10 days have passed since your symptoms first appeared OR after 10 days passed from first positive test.  Separate yourself from other people and animals in your home.  Call ahead before visiting your doctor.  Wear a facemask.  Cover your coughs and sneezes.  Wash your hands often with soap and water; hand  can be used, too.  Avoid sharing personal household items.  Wipe down surfaces used  daily.  Monitor your symptoms. Seek prompt medical attention if your illness is worsening (e.g., difficulty breathing).   Before seeking care, call your healthcare provider.  If you have a medical emergency and need to call 911, notify the dispatch personnel that you have, or are being evaluated for COVID-19. If possible, put on a facemask before emergency medical services arrive.        Recommended precautions for household members, intimate partners, and caregivers in a home setting of a patient with symptomatic laboratory-confirmed COVID-19 or a patient under investigation.  Household members, intimate partners, and caregivers in the home setting awaiting tests results have close contact with a person with symptomatic, laboratory-confirmed COVID-19 or a person under investigation. Close contacts should monitor their health; they should call their provider right away if they develop symptoms suggestive of COVID-19 (e.g., fever, cough, shortness of breath).    Close contacts should also follow these recommendations:  Make sure that you understand and can help the patient follow their provider's instructions for medication(s) and care. You should help the patient with basic needs in the home and provide support for getting groceries, prescriptions, and other personal needs.  Monitor the patient's symptoms. If the patient is getting sicker, call his or her healthcare provider and tell them that the patient has laboratory-confirmed COVID-19. If the patient has a medical emergency and you need to call 911, notify the dispatch personnel that the patient has, or is being evaluated for COVID-19.  Household members should stay in another room or be  from the patient. Household members should use a separate bedroom and bathroom, if available.  Prohibit visitors.  Household members should care for any pets in the home.  Make sure that shared spaces in the home have good air flow, such as by an air conditioner or an  opened window, weather permitting.  Perform hand hygiene frequently. Wash your hands often with soap and water for at least 20 seconds or use an alcohol-based hand  (that contains > 60% alcohol) covering all surfaces of your hands and rubbing them together until they feel dry. Soap and water should be used preferentially.  Avoid touching your eyes, nose, and mouth.  The patient should wear a facemask. If the patient is not able to wear a facemask (for example, because it causes trouble breathing), caregivers should wear a mask when they are in the same room as the patient.  Wear a disposable facemask and gloves when you touch or have contact with the patient's blood, stool, or body fluids, such as saliva, sputum, nasal mucus, vomit, urine.  Throw out disposable facemasks and gloves after using them. Do not reuse.  When removing personal protective equipment, first remove and dispose of gloves. Then, immediately clean your hands with soap and water or alcohol-based hand . Next, remove and dispose of facemask, and immediately clean your hands again with soap and water or alcohol-based hand .  You should not share dishes, drinking glasses, cups, eating utensils, towels, bedding, or other items with the patient. After the patient uses these items, you should wash them thoroughly (see below Wash laundry thoroughly).  Clean all high-touch surfaces, such as counters, tabletops, doorknobs, bathroom fixtures, toilets, phones, keyboards, tablets, and bedside tables, every day. Also, clean any surfaces that may have blood, stool, or body fluids on them.  Use a household cleaning spray or wipe, according to the label instructions. Labels contain instructions for safe and effective use of the cleaning product including precautions you should take when applying the product, such as wearing gloves and making sure you have good ventilation during use of the product.  Wash laundry  thoroughly.  Immediately remove and wash clothes or bedding that have blood, stool, or body fluids on them.  Wear disposable gloves while handling soiled items and keep soiled items away from your body. Clean your hands (with soap and water or an alcohol-based hand ) immediately after removing your gloves.  Read and follow directions on labels of laundry or clothing items and detergent. In general, using a normal laundry detergent according to washing machine instructions and dry thoroughly using the warmest temperatures recommended on the clothing label.  Place all used disposable gloves, facemasks, and other contaminated items in a lined container before disposing of them with other household waste. Clean your hands (with soap and water or an alcohol-based hand ) immediately after handling these items. Soap and water should be used preferentially if hands are visibly dirty.  Discuss any additional questions with your [facility] or local health department or healthcare provider. Check available hours when contacting your local health department.    For more information see CDC link below.      https://www.cdc.gov/coronavirus/2019-ncov/hcp/guidance-prevent-spread.html#precautions        Sources:  [facility], [facility]          Instructions for Home Care of Patients and Caretakers with Coronavirus Disease 2019  Limit visitors to the home.  Older persons and those that have chronic medical conditions such as diabetes, lung and heart disease are at increased risk for illness.   If possible, patients should use a separate bedroom while recovering. Caregivers and household members should avoid prolonged contact with the patient which means to stay 6 feet away and avoid contact with cough droplets.  When close contact is necessary, wash your hands before and immediately after contact.   Perform hand hygiene frequently. Wash your hands often with soap and water for at least 20  seconds or use an alcohol-based hand , covering all surfaces of your hands and rubbing them together until they feel dry.   Avoid touching your eyes, nose, and mouth with unwashed hands.  Avoid sharing household items with the patient. You should not share dishes, drinking glasses, cups, eating utensils, towels, bedding, or other items. After the patient uses these items, you should wash them thoroughly.  Wash laundry thoroughly.   Immediately remove and wash clothes or bedding that have blood, stool, or body fluids on them.  Clean all high-touch surfaces, such as counters, tabletops, doorknobs, bathroom fixtures, toilets, phones, keyboards, tablets, and bedside tables, every day.   Use a household cleaning spray or wipe, according to the label instructions. Labels contain instructions for safe and effective use of the cleaning product including precautions you should take when applying the product, such as wearing gloves and making sure you have good ventilation during use of the product.    For more information see CDC link below.      https://www.cdc.gov/coronavirus/2019-ncov/hcp/guidance-prevent-spread.html#precautions               If your symptoms worsen or if you have any other concerns, please contact [facility] at [phone].

## 2022-05-11 NOTE — PROGRESS NOTES
"Subjective:       Patient ID: Bao Mckeon is a 82 y.o. male.    Vitals:  height is 5' 5" (1.651 m) and weight is 62.6 kg (138 lb). His temperature is 98.5 °F (36.9 °C). His blood pressure is 130/80 and his pulse is 54 (abnormal). His respiration is 20 and oxygen saturation is 98%.     Chief Complaint: Sore Throat    82-year-old male presents to urgent care clinic for evaluation.  Pt reports that he had sx yesterday. Pt reports that yesterday he had sore throat , tired , and he slept for more than 8 hours.  Symptoms have completely resolved.  He is back to his usual.  He has no COVID exposure.  Pt states that he wants to make sure he does not have Covid. Pt has no other associated symptoms.    He has already vaccinated for COVID-19 with booster.    Sore Throat   This is a new problem. The current episode started yesterday. The problem has been resolved. Neither side of throat is experiencing more pain than the other. There has been no fever. The pain is at a severity of 0/10. The patient is experiencing no pain. Pertinent negatives include no abdominal pain, congestion, coughing, diarrhea, ear pain, headaches, neck pain, shortness of breath, trouble swallowing or vomiting. He has had no exposure to strep. He has tried nothing for the symptoms. The treatment provided significant relief.       Constitution: Negative for activity change, chills, sweating, fatigue, fever and generalized weakness.   HENT: Positive for sore throat. Negative for ear pain, hearing loss, facial swelling, congestion, postnasal drip, sinus pain, sinus pressure, trouble swallowing and voice change.    Neck: Negative for neck pain, neck stiffness and painful lymph nodes.   Cardiovascular: Negative for chest pain, leg swelling, palpitations, sob on exertion and passing out.   Eyes: Negative for eye discharge, eye pain, eye redness, photophobia, vision loss, double vision, blurred vision and eyelid swelling.   Respiratory: Negative for chest " tightness, cough, sputum production, COPD, shortness of breath, wheezing and asthma.    Gastrointestinal: Negative for abdominal pain, nausea, vomiting, diarrhea, bright red blood in stool, dark colored stools, rectal bleeding, heartburn and bowel incontinence.   Genitourinary: Negative for dysuria, frequency, urgency, urine decreased, flank pain, bladder incontinence, hematuria and history of kidney stones.   Musculoskeletal: Negative for trauma, joint pain, joint swelling, abnormal ROM of joint, muscle cramps and muscle ache.   Skin: Negative for color change, pale, rash and wound.   Allergic/Immunologic: Negative for seasonal allergies, asthma and immunocompromised state.   Neurological: Negative for dizziness, history of vertigo, light-headedness, passing out, facial drooping, speech difficulty, coordination disturbances, loss of balance, headaches, disorientation, altered mental status, loss of consciousness, numbness, tingling and seizures.   Hematologic/Lymphatic: Negative for swollen lymph nodes, easy bruising/bleeding and trouble clotting. Does not bruise/bleed easily.   Psychiatric/Behavioral: Negative for altered mental status and disorientation.       Past Medical History:   Diagnosis Date    Arthritis     Cataract     Glaucoma        Objective:      Physical Exam   Constitutional: He is oriented to person, place, and time. He appears well-developed. He is cooperative.  Non-toxic appearance. He does not appear ill. No distress.      Comments:Well-appearing and talkative.     HENT:   Head: Normocephalic and atraumatic.   Ears:   Right Ear: Hearing, external ear and ear canal normal. No drainage, swelling or tenderness.   Left Ear: Hearing, external ear and ear canal normal. No drainage, swelling or tenderness.   Nose: Nose normal. No rhinorrhea, purulent discharge or congestion. Right sinus exhibits no maxillary sinus tenderness and no frontal sinus tenderness. Left sinus exhibits no maxillary sinus  tenderness and no frontal sinus tenderness.   Mouth/Throat: Uvula is midline, oropharynx is clear and moist and mucous membranes are normal. Mucous membranes are moist. No oral lesions. No trismus in the jaw. No uvula swelling. No oropharyngeal exudate, posterior oropharyngeal edema or posterior oropharyngeal erythema. No tonsillar exudate.   Eyes: Conjunctivae, EOM and lids are normal. Pupils are equal, round, and reactive to light. No visual field deficit is present. Right eye exhibits no discharge. Left eye exhibits no discharge. Right conjunctiva is not injected. Right conjunctiva has no hemorrhage. Left conjunctiva is not injected. Left conjunctiva has no hemorrhage. Extraocular movement intact vision grossly intact gaze aligned appropriately   Neck: Neck supple. No neck rigidity present.   Cardiovascular: Normal rate, regular rhythm, normal heart sounds and normal pulses.   No murmur heard.     Comments: No leg edema, calf tenderness/erythema, or Homans sign bilaterally.     Pulmonary/Chest: Effort normal and breath sounds normal. No accessory muscle usage or stridor. No respiratory distress. He has no wheezes. He exhibits no tenderness.   Abdominal: Normal appearance. He exhibits no distension and no mass. Soft. There is no abdominal tenderness. There is no rebound and no guarding.   Musculoskeletal: Normal range of motion.         General: Normal range of motion.      Right lower leg: No edema.      Left lower leg: No edema.      Comments: Moves all extremities with normal tone, strength, and ROM.  Gait normal.   Lymphadenopathy:     He has no cervical adenopathy.   Neurological: no focal deficit. He is alert, oriented to person, place, and time and at baseline. He has normal motor skills, normal sensation and intact cranial nerves. He displays no weakness, facial symmetry, normal reflexes and no dysarthria. No cranial nerve deficit or sensory deficit. He exhibits normal muscle tone. He has a normal  Finger-Nose-Finger Test. Coordination: Heel to shin test normal. He shows no pronator drift. He displays no seizure activity. Gait and coordination normal. Coordination normal. GCS eye subscore is 4. GCS verbal subscore is 5. GCS motor subscore is 6.   Skin: Skin is warm, dry, not diaphoretic and no rash. Capillary refill takes less than 2 seconds.   Psychiatric: His speech is normal and behavior is normal. Mood and thought content normal.   Nursing note and vitals reviewed.        Results for orders placed or performed in visit on 05/11/22   POCT COVID-19 Rapid Screening   Result Value Ref Range    POC Rapid COVID Negative Negative     Acceptable Yes        Assessment:       1. Sore throat    2. Elevated blood-pressure reading, without diagnosis of hypertension    3. Encounter for screening for COVID-19          Nontoxic appearing. Vitals are stable. Patient presents for COVID nasal swab testing. Patient is concerned for possible exposure. Rapid covid negative.   All diagnostic testing personally reviewed and interpreted.   Patient has symptoms at this time which is consistent with above diagnosis.        Patient was recommended OTC treatments for their symptoms.     We had a long conversation regarding his elevated her pressure reading.  I discussed with him the it is multifactorial and repeat manual blood pressure reading is within normal limits 130/80 which is is normal.  I also recommended him to exercise daily, avoid salt intake, and avoid stress as it can affect his blood pressure reading.  Handout given for him for appropriate blood pressure monitoring at home and follow-up PCP for this.     Patient was counseled, explained with the test results meaning, expected course, and answered all of questions. They can also receive results via my chart.  Printed and verbal COVID guidelines were given.   Recommend follow-up PCP in the next 2-3 days if new or worsening symptoms.    Patient understands  that they received an Urgent Care treatment only and that they may be released before all your medical problems are known or treated. Strict ED versus clinic precautions given.  Patient verbalized understanding and agreed with plan of care.    Note dictated with voice recognition software, please excuse any grammatical errors.    Plan:         Sore throat  -     POCT COVID-19 Rapid Screening    Elevated blood-pressure reading, without diagnosis of hypertension    Encounter for screening for COVID-19              Additional MDM:     Heart Failure Score:   COPD = No    Patient Instructions     PLEASE READ YOUR DISCHARGE INSTRUCTIONS ENTIRELY AS IT CONTAINS IMPORTANT INFORMATION.    Patient had covid testing done today.      If Negative and no direct exposure: symptom free without fever reducing meds in 24 hours - can go back to work in 24 hours with surgical mask for 14 days.    Discussed corona virus precautions and reviewed [facility] FAC; printed a copy for patient.  I discussed to continue to monitor their symptoms. Discussed that if their symptoms persist or worsen to seek re-evaluation. Clinic vs. ER precautions were given.  Patient verbalized understanding and agreed with the above plan of care.    - Rest.  Drink plenty of fluids.    - Tylenol/anti-inflammatory(ibuprofen/motrin/aleve) as directed as needed for fever/pain.  For Tylenol, do not exceed 3000 mg/ day. If no contraindication.  -OK to supplement with OTC DayQuil, NyQuil or TheraFlu every 6 hours.  Use caution of total amount of Tylenol/acetaminophen per day.  - Reviewed radiographs and all diagnostic testing with patient/family.      -Below are suggestions for symptomatic relief:              -Salt water gargles to soothe throat pain.              -Chloroseptic spray also helps to numb throat pain.              -Nasal saline spray reduces inflammation and dryness.              -Warm face compresses to help with facial sinus pain/pressure.              -Vicks  vapor rub at night.              -Flonase OTC or Nasacort OTC  once a day for nasal/sinus congestion. DON'T USE IF YOU HAVE GLAUCOMA. CHECK WITH YOUR PHARMACIST/PHYSICIAN.              -Simple foods like chicken noodle soup.              -Mucinex DM (ANY COUGH EXPECTORANT) for cough or chest congestion with mucus during the day time. Delsym or robitussin (ANY COUGH SUPPRESSANT) helps with coughing at night. Mucinex-D if you have chest congestion or sputum (caution if history of high blood pressure or palpitations).              -Zyrtec/Claritin/xyzal during the day time  & Benadryl at night (only if severe runny nose) may help with allergies and runny nose. Add decongestant if you have nasal/sinus congestion/sinus pressure/ear fullness sensation. (see below)    Caution with use of Decongestant meds:  -If you DO NOT have Hypertension or any history of palpitations, it is ok to take over the counter Sudafed or Mucinex D or Allegra-D or Claritin-D or Zyrtec-D.  -If you do take one of the above, it is ok to combine that with plain over the counter Mucinex or Allegra or Claritin or Zyrtec. If, for example, you are taking Zyrtec -D, you can combine that with Mucinex, but not Mucinex-D.  If you are taking Mucinex-D, you can combine that with plain Allegra or Claritin or Zyrtec.     -Do not combine pseudophed or phenylephrine with any other brand allergy-D for DECONGESTANT.   -Or vice versa, you can you take plain allergy medications (allegra/claritin/zyrtec with NO Decongestant) and ADD OTC pseudophed or phenelyphrine 2-3 times a day. Avoid taking decongestant late at night or with caffeine as it can keep you up or cause jittery feeling.    -If you DO have Hypertension , anxiety, or palpitations, it is safe to take Coricidin HBP for relief of sinus symptoms.      For your GI symptoms:  -Use gatorade/pedialyte or rehydration packets to help stay hydrated. Vitamin water and plain water do not contain rehydrating  electrolytes.  -Increase clear liquids (water, gatorade, pedialyte, broths, jello, etc) Hold off on solids for 12-18 hours. Then advance to BRAT diet (banana, rice, applesauce, tea, toast/crackers), then advance further as tolerated. Avoid spicy or fatty foods.   -May take Imitrol OTC as needed for nausea.   -Use Peptobismol or Immodium to help alleviate your diarrhea symptoms.   -Take mylanta or simethicone for bloating or gas pain.   -Take pepcid or omeprazole if you have heartburn or reflux sensation.  -Avoid imodium unless you have more than 6 loose stools in 24 hours. Take 1 dose and monitor to see if you can repeat AS IT WILL CAUSE CONSTIPATION.  -Wash hands frequently while sick. Avoid ibuprofen or other NSAIDS until you are well.   -Please go to the ER if you experience worsening abdominal pain, blood in your vomit or stool, high fever, dizziness, fainting, swelling of your abdomen, inability to pass gas or stool, or inability to urinate.         -You must understand that you've received an Urgent Care treatment only and that you may be released before all your medical problems are known or treated. You, the patient, will arrange for follow up care as instructed. Please arrange follow up with your primary medical clinic within 2-5 days if your signs and symptoms have not resolved or worsen.     - Follow up with your PCP or specialty clinic as directed.  You can call [phone] or [phone] to schedule an appointment with the appropriate provider.    - If your condition worsens or fails to improve we recommend that you receive another evaluation at the emergency room immediately or contact your primary medical clinic to discuss your concerns.              Prevention steps for patients with confirmed or suspected COVID-19   Stay home and stay away from family members and friends. The CDC says, you can leave home after these three things have happened: 1) You have had no fever for at least 24 hours  (that is one full day of no fever without the use of medicine that reduces fevers) 2) AND other symptoms have improved (for example, when your cough or shortness of breath have improved) 3) AND at least 10 days have passed since your symptoms first appeared OR after 10 days passed from first positive test.   Separate yourself from other people and animals in your home.   Call ahead before visiting your doctor.   Wear a facemask.   Cover your coughs and sneezes.   Wash your hands often with soap and water; hand  can be used, too.   Avoid sharing personal household items.   Wipe down surfaces used daily.   Monitor your symptoms. Seek prompt medical attention if your illness is worsening (e.g., difficulty breathing).    Before seeking care, call your healthcare provider.   If you have a medical emergency and need to call 911, notify the dispatch personnel that you have, or are being evaluated for COVID-19. If possible, put on a facemask before emergency medical services arrive.        Recommended precautions for household members, intimate partners, and caregivers in a home setting of a patient with symptomatic laboratory-confirmed COVID-19 or a patient under investigation.  Household members, intimate partners, and caregivers in the home setting awaiting tests results have close contact with a person with symptomatic, laboratory-confirmed COVID-19 or a person under investigation. Close contacts should monitor their health; they should call their provider right away if they develop symptoms suggestive of COVID-19 (e.g., fever, cough, shortness of breath).    Close contacts should also follow these recommendations:   Make sure that you understand and can help the patient follow their provider's instructions for medication(s) and care. You should help the patient with basic needs in the home and provide support for getting groceries, prescriptions, and other personal needs.   Monitor the patient's  symptoms. If the patient is getting sicker, call his or her healthcare provider and tell them that the patient has laboratory-confirmed COVID-19. If the patient has a medical emergency and you need to call 911, notify the dispatch personnel that the patient has, or is being evaluated for COVID-19.   Household members should stay in another room or be  from the patient. Household members should use a separate bedroom and bathroom, if available.   Prohibit visitors.   Household members should care for any pets in the home.   Make sure that shared spaces in the home have good air flow, such as by an air conditioner or an opened window, weather permitting.   Perform hand hygiene frequently. Wash your hands often with soap and water for at least 20 seconds or use an alcohol-based hand  (that contains > 60% alcohol) covering all surfaces of your hands and rubbing them together until they feel dry. Soap and water should be used preferentially.   Avoid touching your eyes, nose, and mouth.   The patient should wear a facemask. If the patient is not able to wear a facemask (for example, because it causes trouble breathing), caregivers should wear a mask when they are in the same room as the patient.   Wear a disposable facemask and gloves when you touch or have contact with the patient's blood, stool, or body fluids, such as saliva, sputum, nasal mucus, vomit, urine.  o Throw out disposable facemasks and gloves after using them. Do not reuse.  o When removing personal protective equipment, first remove and dispose of gloves. Then, immediately clean your hands with soap and water or alcohol-based hand . Next, remove and dispose of facemask, and immediately clean your hands again with soap and water or alcohol-based hand .   You should not share dishes, drinking glasses, cups, eating utensils, towels, bedding, or other items with the patient. After the patient uses these items, you  should wash them thoroughly (see below Wash laundry thoroughly).   Clean all high-touch surfaces, such as counters, tabletops, doorknobs, bathroom fixtures, toilets, phones, keyboards, tablets, and bedside tables, every day. Also, clean any surfaces that may have blood, stool, or body fluids on them.   Use a household cleaning spray or wipe, according to the label instructions. Labels contain instructions for safe and effective use of the cleaning product including precautions you should take when applying the product, such as wearing gloves and making sure you have good ventilation during use of the product.   Wash laundry thoroughly.  o Immediately remove and wash clothes or bedding that have blood, stool, or body fluids on them.  o Wear disposable gloves while handling soiled items and keep soiled items away from your body. Clean your hands (with soap and water or an alcohol-based hand ) immediately after removing your gloves.  o Read and follow directions on labels of laundry or clothing items and detergent. In general, using a normal laundry detergent according to washing machine instructions and dry thoroughly using the warmest temperatures recommended on the clothing label.   Place all used disposable gloves, facemasks, and other contaminated items in a lined container before disposing of them with other household waste. Clean your hands (with soap and water or an alcohol-based hand ) immediately after handling these items. Soap and water should be used preferentially if hands are visibly dirty.   Discuss any additional questions with your state or local health department or healthcare provider. Check available hours when contacting your local health department.    For more information see CDC link below.      https://www.cdc.gov/coronavirus/2019-ncov/hcp/guidance-prevent-spread.html#precautions        Sources:  [facility], [place] [facility]          Instructions  for Home Care of Patients and Caretakers with Coronavirus Disease 2019   Limit visitors to the home.  Older persons and those that have chronic medical conditions such as diabetes, lung and heart disease are at increased risk for illness.    If possible, patients should use a separate bedroom while recovering. Caregivers and household members should avoid prolonged contact with the patient which means to stay 6 feet away and avoid contact with cough droplets.  When close contact is necessary, wash your hands before and immediately after contact.    Perform hand hygiene frequently. Wash your hands often with soap and water for at least 20 seconds or use an alcohol-based hand , covering all surfaces of your hands and rubbing them together until they feel dry.    Avoid touching your eyes, nose, and mouth with unwashed hands.   Avoid sharing household items with the patient. You should not share dishes, drinking glasses, cups, eating utensils, towels, bedding, or other items. After the patient uses these items, you should wash them thoroughly.   Wash laundry thoroughly.   o Immediately remove and wash clothes or bedding that have blood, stool, or body fluids on them.   Clean all high-touch surfaces, such as counters, tabletops, doorknobs, bathroom fixtures, toilets, phones, keyboards, tablets, and bedside tables, every day.   o Use a household cleaning spray or wipe, according to the label instructions. Labels contain instructions for safe and effective use of the cleaning product including precautions you should take when applying the product, such as wearing gloves and making sure you have good ventilation during use of the product.    For more information see CDC link below.      https://www.cdc.gov/coronavirus/2019-ncov/hcp/guidance-prevent-spread.html#precautions               If your symptoms worsen or if you have any other concerns, please contact [facility] at [phone].

## 2022-06-17 ENCOUNTER — HOSPITAL ENCOUNTER (EMERGENCY)
Facility: HOSPITAL | Age: 82
Discharge: HOME | End: 2022-06-17
Attending: EMERGENCY MEDICINE
Payer: MEDICARE

## 2022-06-17 VITALS
OXYGEN SATURATION: 98 % | HEART RATE: 65 BPM | SYSTOLIC BLOOD PRESSURE: 142 MMHG | RESPIRATION RATE: 20 BRPM | WEIGHT: 138 LBS | BODY MASS INDEX: 22.96 KG/M2 | TEMPERATURE: 98 F | DIASTOLIC BLOOD PRESSURE: 102 MMHG

## 2022-06-17 DIAGNOSIS — I10 HYPERTENSION, UNSPECIFIED TYPE: ICD-10-CM

## 2022-06-17 DIAGNOSIS — M25.512 ACUTE PAIN OF LEFT SHOULDER: ICD-10-CM

## 2022-06-17 DIAGNOSIS — T14.8XXA MULTIPLE SKIN TEARS: ICD-10-CM

## 2022-06-17 DIAGNOSIS — S00.212A ABRASION OF LEFT EYEBROW, INITIAL ENCOUNTER: Primary | ICD-10-CM
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
## 2022-06-17 DIAGNOSIS — S43.005A DISLOCATION OF LEFT SHOULDER JOINT, INITIAL ENCOUNTER: ICD-10-CM

## 2022-06-17 PROBLEM — S42.92XA: Status: ACTIVE | Noted: 2022-06-17

## 2022-06-17 LAB
ALBUMIN SERPL BCP-MCNC: 3.9 G/DL (ref 3.5–5.2)
ALP SERPL-CCNC: 83 U/L (ref 55–135)
ALT SERPL W/O P-5'-P-CCNC: 33 U/L (ref 10–44)
ANION GAP SERPL CALC-SCNC: 11 MMOL/L (ref 8–16)
AST SERPL-CCNC: 34 U/L (ref 10–40)
BASOPHILS # BLD AUTO: 0.05 K/UL (ref 0–0.2)
BASOPHILS NFR BLD: 0.4 % (ref 0–1.9)
BILIRUB SERPL-MCNC: 0.3 MG/DL (ref 0.1–1)
BUN SERPL-MCNC: 17 MG/DL (ref 8–23)
CALCIUM SERPL-MCNC: 8.7 MG/DL (ref 8.7–10.5)
CHLORIDE SERPL-SCNC: 105 MMOL/L (ref 95–110)
CO2 SERPL-SCNC: 23 MMOL/L (ref 23–29)
CREAT SERPL-MCNC: 0.9 MG/DL (ref 0.5–1.4)
DIFFERENTIAL METHOD: ABNORMAL
EOSINOPHIL # BLD AUTO: 0.1 K/UL (ref 0–0.5)
EOSINOPHIL NFR BLD: 0.7 % (ref 0–8)
ERYTHROCYTE [DISTWIDTH] IN BLOOD BY AUTOMATED COUNT: 12.6 % (ref 11.5–14.5)
EST. GFR  (AFRICAN AMERICAN): >60 ML/MIN/1.73 M^2
EST. GFR  (NON AFRICAN AMERICAN): >60 ML/MIN/1.73 M^2
GLUCOSE SERPL-MCNC: 137 MG/DL (ref 70–110)
HCT VFR BLD AUTO: 41 % (ref 40–54)
HGB BLD-MCNC: 13.3 G/DL (ref 14–18)
IMM GRANULOCYTES # BLD AUTO: 0.05 K/UL (ref 0–0.04)
IMM GRANULOCYTES NFR BLD AUTO: 0.4 % (ref 0–0.5)
LYMPHOCYTES # BLD AUTO: 1.2 K/UL (ref 1–4.8)
LYMPHOCYTES NFR BLD: 9.9 % (ref 18–48)
MCH RBC QN AUTO: 28.9 PG (ref 27–31)
MCHC RBC AUTO-ENTMCNC: 32.4 G/DL (ref 32–36)
MCV RBC AUTO: 89 FL (ref 82–98)
MONOCYTES # BLD AUTO: 0.7 K/UL (ref 0.3–1)
MONOCYTES NFR BLD: 5.6 % (ref 4–15)
NEUTROPHILS # BLD AUTO: 10.2 K/UL (ref 1.8–7.7)
NEUTROPHILS NFR BLD: 83 % (ref 38–73)
NRBC BLD-RTO: 0 /100 WBC
PLATELET # BLD AUTO: 199 K/UL (ref 150–450)
PMV BLD AUTO: 10 FL (ref 9.2–12.9)
POTASSIUM SERPL-SCNC: 3.5 MMOL/L (ref 3.5–5.1)
PROT SERPL-MCNC: 7 G/DL (ref 6–8.4)
RBC # BLD AUTO: 4.6 M/UL (ref 4.6–6.2)
SODIUM SERPL-SCNC: 139 MMOL/L (ref 136–145)
WBC # BLD AUTO: 12.33 K/UL (ref 3.9–12.7)

## 2022-06-17 PROCEDURE — 23665 PR CLOSED RX SHLDR DISLOC,GR TUB FX: ICD-10-PCS | Mod: LT,,, | Performed by: EMERGENCY MEDICINE

## 2022-06-17 PROCEDURE — 80053 COMPREHEN METABOLIC PANEL: CPT

## 2022-06-17 PROCEDURE — 99284 EMERGENCY DEPT VISIT MOD MDM: CPT | Mod: 25,,, | Performed by: EMERGENCY MEDICINE

## 2022-06-17 PROCEDURE — 63600175 PHARM REV CODE 636 W HCPCS

## 2022-06-17 PROCEDURE — 25000003 PHARM REV CODE 250: Performed by: EMERGENCY MEDICINE

## 2022-06-17 PROCEDURE — 85025 COMPLETE CBC W/AUTO DIFF WBC: CPT

## 2022-06-17 PROCEDURE — 23650 CLTX SHO DSLC W/MNPJ WO ANES: CPT | Mod: LT

## 2022-06-17 PROCEDURE — 25000003 PHARM REV CODE 250

## 2022-06-17 PROCEDURE — 99152 MOD SED SAME PHYS/QHP 5/>YRS: CPT | Mod: 59

## 2022-06-17 PROCEDURE — 23665 CLTX SHO DSLC FX GR HMRL TBR: CPT | Mod: LT,,, | Performed by: EMERGENCY MEDICINE

## 2022-06-17 PROCEDURE — 99284 PR EMERGENCY DEPT VISIT,LEVEL IV: ICD-10-PCS | Mod: 25,,, | Performed by: EMERGENCY MEDICINE

## 2022-06-17 PROCEDURE — 96374 THER/PROPH/DIAG INJ IV PUSH: CPT | Mod: 59

## 2022-06-17 PROCEDURE — 99285 EMERGENCY DEPT VISIT HI MDM: CPT | Mod: 25

## 2022-06-17 RX ORDER — PROPOFOL 10 MG/ML
60 VIAL (ML) INTRAVENOUS ONCE
Status: COMPLETED | OUTPATIENT
Start: 2022-06-17 | End: 2022-06-17

## 2022-06-17 RX ORDER — LIDOCAINE HYDROCHLORIDE 10 MG/ML
10 INJECTION INFILTRATION; PERINEURAL ONCE
Status: COMPLETED | OUTPATIENT
Start: 2022-06-17 | End: 2022-06-17

## 2022-06-17 RX ORDER — MORPHINE SULFATE 4 MG/ML
4 INJECTION, SOLUTION INTRAMUSCULAR; INTRAVENOUS
Status: COMPLETED | OUTPATIENT
Start: 2022-06-17 | End: 2022-06-17

## 2022-06-17 RX ORDER — KETAMINE HYDROCHLORIDE 50 MG/ML
1 INJECTION, SOLUTION INTRAMUSCULAR; INTRAVENOUS
Status: COMPLETED | OUTPATIENT
Start: 2022-06-17 | End: 2022-06-17

## 2022-06-17 RX ADMIN — LIDOCAINE HYDROCHLORIDE 10 ML: 10 INJECTION, SOLUTION INFILTRATION; PERINEURAL at 05:06

## 2022-06-17 RX ADMIN — PROPOFOL 60 MG: 10 INJECTION, EMULSION INTRAVENOUS at 06:06

## 2022-06-17 RX ADMIN — MORPHINE SULFATE 4 MG: 4 INJECTION INTRAVENOUS at 04:06

## 2022-06-17 RX ADMIN — KETAMINE HYDROCHLORIDE 62.5 MG: 50 INJECTION INTRAMUSCULAR; INTRAVENOUS at 05:06

## 2022-06-17 NOTE — CONSULTS
[facility] - Emergency Dept  Orthopedics  Consult Note    Patient Name: Bao Mckeon  MRN: [id number]  Admission Date: 6/17/2022  Hospital Length of Stay: 0 days  Attending Provider: No att. providers found  Primary Care Provider: Gilmar Fox MD    Patient information was obtained from patient and ER records.     Inpatient consult to Orthopedics  Consult performed by: José Miguel Ramírez MD  Consult ordered by: José Miguel Ramírez MD        Subjective:     Principal Problem:Closed fracture dislocation of joint of left shoulder girdle    Chief Complaint:   Chief Complaint   Patient presents with    Fall     Pt had trip and fall at home. -LOC, -bloodthinners. Left shoulder deformity and L eyebrow abrasion noted. 2+ radial pulses bilaterally.         HPI: Bao Mckeon is a 82 y.o. male who presents to the emergency department after a fall with left shoulder pain.  Patient reports he was getting out of bed quickly to answer the door when he tripped and fell onto his left side.  Patient reports that he fell face forward and hit his head.  He denies any loss of consciousness.  He was able to get up immediately after the fall.  He is currently complaining of left shoulder pain.  He denies any chest pain, nausea, vomiting, vision changes, lower extremity weakness or pain. X-ray emergency department revealed a left anterior shoulder dislocation with a greater tuberosity fracture.    Orthopedics consulted for reduction of the shoulder after failed initial attempt by the ED.      Past Medical History:   Diagnosis Date    Arthritis     Cataract     Glaucoma        Past Surgical History:   Procedure Laterality Date    CATARACT EXTRACTION W/  INTRAOCULAR LENS IMPLANT Left n/a    Multifocal IOL (Dr. Betancourt)    HERNIA REPAIR      right inguinal        Review of patient's allergies indicates:  No Known Allergies    No current facility-administered medications for this encounter.     Current Outpatient Medications    Medication Sig    brimonidine 0.2% (ALPHAGAN) 0.2 % Drop Place 1 drop into both eyes 2 (two) times daily.    ciprofloxacin HCl (CIPRO) 500 MG tablet     dorzolamide (TRUSOPT) 2 % ophthalmic solution Place 1 drop into both eyes 3 (three) times daily.    latanoprost 0.005 % ophthalmic solution Place 1 drop into both eyes once daily.    tamsulosin (FLOMAX) 0.4 mg Cap     varicella-zoster gE-AS01B, PF, (SHINGRIX, PF,) 50 mcg/0.5 mL injection Shingrix (PF) 50 mcg/0.5 mL intramuscular suspension, kit     Family History       Problem Relation (Age of Onset)    Cancer Mother (83)    Cataracts Sister          Tobacco Use    Smoking status: Never Smoker    Smokeless tobacco: Never Used   Substance and Sexual Activity    Alcohol use: Yes     Comment: rare    Drug use: No    Sexual activity: Not Currently     Partners: Female     Review of Systems   Unable to perform ROS: Other sedated upon arrival    Objective:     Vital Signs (Most Recent):  Temp: 98.3 °F (36.8 °C) (06/17/22 0321)  Pulse: 70 (06/17/22 0655)  Resp: 20 (06/17/22 0655)  BP: (!) 158/117 (06/17/22 0655)  SpO2: 98 % (06/17/22 0655)   Vital Signs (24h Range):  Temp:  [98.3 °F (36.8 °C)] 98.3 °F (36.8 °C)  Pulse:  [] 70  Resp:  [20-24] 20  SpO2:  [93 %-100 %] 98 %  BP: (158-239)/() 158/117     Weight: 62.6 kg (138 lb)     Body mass index is 22.96 kg/m².    No intake or output data in the 24 hours ending 06/17/22 0705    Ortho/SPM Exam  General: no acute distress, appears stated age     Neuro: alert and oriented x3   Psych: normal mood   Head: normocephalic, small abrasion over left forehead with minimal bleeding  Eyes: no scleral icterus   Mouth: moist mucous membranes   Cardiovascular: extremities warm and well perfused   Lungs: breathing comfortably, equal chest rise bilat   Skin: clean, dry, intact (any exceptions noted in below musculoskeletal exam)    Musculoskeletal:  LUE:  - Posterior void deformity present indicating anterior  dislocated shoulder  - Skin intact throughout shoulder; small skin tear over medial elbow  - AROM and PROM of the elbow, wrist, and hand intact without pain  - Axillary/AIN/PIN/Radial/Median/Ulnar nerves assessed in isolation and are without deficit  - SILT throughout  - Compartments soft  - 2+ radial artery pulse  - Capillary Refill < 2 sec    RUE:  - Skin intact throughout, no open wounds  - No swelling  - No ecchymosis, erythema, or signs of cellulitis  - NonTTP throughout  - AROM and PROM of the shoulder, elbow, wrist, and hand intact without pain  - Axillary/AIN/PIN/Radial/Median/Ulnar nerves assessed in isolation and are without deficit  - SILT throughout  - Compartments soft  - 2+ radial artery pulse  - Capillary Refill < 2 sec    LLE:  - Small skin tear over the left anterior leg  - No swelling  - No ecchymosis, erythema, or signs of cellulitis  - NonTTP throughout  - AROM and PROM of the hip, knee, ankle, and foot intact without pain  - TA/EHL/Gastroc/FHL assessed in isolation and are without deficit  - SILT throughout  - Compartments soft  - 2+ DP and PT pulses  - Capillary Refill < 2 sec  - Negative Log roll    RLE:  - Skin intact throughout, no open wounds  - No swelling  - No ecchymosis, erythema, or signs of cellulitis  - NonTTP throughout  - AROM and PROM of the hip, knee, ankle, and foot intact without pain  - TA/EHL/Gastroc/FHL assessed in isolation and are without deficit  - SILT throughout  - Compartments soft  - 2+ DP and PT pulses  - Capillary Refill < 2 sec  - Negative Log roll    Spine/pelvis/axial body:  No tenderness to palpation of cervical, thoracic, or lumbar spine  No pain with compression of pelvis  No decubitus ulcers    Significant Labs: All pertinent labs within the past 24 hours have been reviewed.    Significant Imaging: I have reviewed and interpreted all pertinent imaging results/findings.  X-ray left shoulder with an anterior dislocation of the humerus with a small comminuted  greater tuberosity fracture fragment  Postreduction x-rays with adequate glenohumeral joint reduction    Assessment/Plan:     * Closed fracture dislocation of joint of left shoulder girdle  Bao Mckeon is a 82 y.o. male with a left fracture dislocation of the his shoulder.  He is closed neurovascularly intact.  He underwent with conscious sedation and glenohumeral joint reduction with the assistance of the ED staff.  Postreduction films verified adequate reduction of the glenohumeral joint.  See procedure note below for additional details.    Plan:  - Reduced in the ED under conscious sedation  - NWB LUE in a sling with abduction pillow  - Pain control per ED  - Will arrange outpatient Orthopedics follow up    Procedure note: left shoulder reduction  After time out was performed and patient ID, side, and site were verified, the patient underwent conscious sedation by the ED staff.  After adequate analgesia, the fracture dislocation was closed reduced. Post-reduction films were obtained which verified maintenance of the reduction.  The patient was placed into a sling with plans to swap that to an abduction pillow.  The patient tolerated the procedure well with no complications.        José Miguel Ramírez MD  Orthopedics  [facility] - Emergency Dept

## 2022-06-17 NOTE — DISCHARGE INSTRUCTIONS
Today, your shoulder was relocated after dislocation.  Please follow-up with Orthopedic surgery in 1 week.  Use your sling and brace as directed until follow-up.  You may remove to shower.    Our goal in the emergency department is to always give you outstanding care and exceptional service. You may receive a survey by mail or e-mail in the next week regarding your experience in our ED. We would greatly appreciate your completing and returning the survey. Your feedback provides us with a way to recognize our staff who give very good care and it helps us learn how to improve when your experience was below our aspiration of excellence.

## 2022-06-17 NOTE — SUBJECTIVE & OBJECTIVE
Past Medical History:   Diagnosis Date    Arthritis     Cataract     Glaucoma        Past Surgical History:   Procedure Laterality Date    CATARACT EXTRACTION W/  INTRAOCULAR LENS IMPLANT Left n/a    Multifocal IOL (Dr. Betancourt)    HERNIA REPAIR      right inguinal        Review of patient's allergies indicates:  No Known Allergies    No current facility-administered medications for this encounter.     Current Outpatient Medications   Medication Sig    brimonidine 0.2% (ALPHAGAN) 0.2 % Drop Place 1 drop into both eyes 2 (two) times daily.    ciprofloxacin HCl (CIPRO) 500 MG tablet     dorzolamide (TRUSOPT) 2 % ophthalmic solution Place 1 drop into both eyes 3 (three) times daily.    latanoprost 0.005 % ophthalmic solution Place 1 drop into both eyes once daily.    tamsulosin (FLOMAX) 0.4 mg Cap     varicella-zoster gE-AS01B, PF, (SHINGRIX, PF,) 50 mcg/0.5 mL injection Shingrix (PF) 50 mcg/0.5 mL intramuscular suspension, kit     Family History       Problem Relation (Age of Onset)    Cancer Mother (83)    Cataracts Sister          Tobacco Use    Smoking status: Never Smoker    Smokeless tobacco: Never Used   Substance and Sexual Activity    Alcohol use: Yes     Comment: rare    Drug use: No    Sexual activity: Not Currently     Partners: Female     Review of Systems   Unable to perform ROS: Other sedated upon arrival    Objective:     Vital Signs (Most Recent):  Temp: 98.3 °F (36.8 °C) (06/17/22 0321)  Pulse: 70 (06/17/22 0655)  Resp: 20 (06/17/22 0655)  BP: (!) 158/117 (06/17/22 0655)  SpO2: 98 % (06/17/22 0655)   Vital Signs (24h Range):  Temp:  [98.3 °F (36.8 °C)] 98.3 °F (36.8 °C)  Pulse:  [] 70  Resp:  [20-24] 20  SpO2:  [93 %-100 %] 98 %  BP: (158-239)/() 158/117     Weight: 62.6 kg (138 lb)     Body mass index is 22.96 kg/m².    No intake or output data in the 24 hours ending 06/17/22 0705    Ortho/SPM Exam  General: no acute distress, appears stated age     Neuro: alert and oriented x3   Psych:  normal mood   Head: normocephalic, small abrasion over left forehead with minimal bleeding  Eyes: no scleral icterus   Mouth: moist mucous membranes   Cardiovascular: extremities warm and well perfused   Lungs: breathing comfortably, equal chest rise bilat   Skin: clean, dry, intact (any exceptions noted in below musculoskeletal exam)    Musculoskeletal:  LUE:  - Posterior void deformity present indicating anterior dislocated shoulder  - Skin intact throughout shoulder; small skin tear over medial elbow  - AROM and PROM of the elbow, wrist, and hand intact without pain  - Axillary/AIN/PIN/Radial/Median/Ulnar nerves assessed in isolation and are without deficit  - SILT throughout  - Compartments soft  - 2+ radial artery pulse  - Capillary Refill < 2 sec    RUE:  - Skin intact throughout, no open wounds  - No swelling  - No ecchymosis, erythema, or signs of cellulitis  - NonTTP throughout  - AROM and PROM of the shoulder, elbow, wrist, and hand intact without pain  - Axillary/AIN/PIN/Radial/Median/Ulnar nerves assessed in isolation and are without deficit  - SILT throughout  - Compartments soft  - 2+ radial artery pulse  - Capillary Refill < 2 sec    LLE:  - Small skin tear over the left anterior leg  - No swelling  - No ecchymosis, erythema, or signs of cellulitis  - NonTTP throughout  - AROM and PROM of the hip, knee, ankle, and foot intact without pain  - TA/EHL/Gastroc/FHL assessed in isolation and are without deficit  - SILT throughout  - Compartments soft  - 2+ DP and PT pulses  - Capillary Refill < 2 sec  - Negative Log roll    RLE:  - Skin intact throughout, no open wounds  - No swelling  - No ecchymosis, erythema, or signs of cellulitis  - NonTTP throughout  - AROM and PROM of the hip, knee, ankle, and foot intact without pain  - TA/EHL/Gastroc/FHL assessed in isolation and are without deficit  - SILT throughout  - Compartments soft  - 2+ DP and PT pulses  - Capillary Refill < 2 sec  - Negative Log  roll    Spine/pelvis/axial body:  No tenderness to palpation of cervical, thoracic, or lumbar spine  No pain with compression of pelvis  No decubitus ulcers    Significant Labs: All pertinent labs within the past 24 hours have been reviewed.    Significant Imaging: I have reviewed and interpreted all pertinent imaging results/findings.  X-ray left shoulder with an anterior dislocation of the humerus with a small comminuted greater tuberosity fracture fragment  Postreduction x-rays with adequate glenohumeral joint reduction

## 2022-06-17 NOTE — ED NOTES
"CT tech at bedside, pt refusing scan, states "I follow up with my doctor often." MD made aware and now at bedside.   "

## 2022-06-17 NOTE — HPI
Bao Mckeon is a 82 y.o. male who presents to the emergency department after a fall with left shoulder pain.  Patient reports he was getting out of bed quickly to answer the door when he tripped and fell onto his left side.  Patient reports that he fell face forward and hit his head.  He denies any loss of consciousness.  He was able to get up immediately after the fall.  He is currently complaining of left shoulder pain.  He denies any chest pain, nausea, vomiting, vision changes, lower extremity weakness or pain. X-ray emergency department revealed a left anterior shoulder dislocation with a greater tuberosity fracture.    Orthopedics consulted for reduction of the shoulder after failed initial attempt by the ED.

## 2022-06-17 NOTE — ED PROVIDER NOTES
Encounter Date: 6/17/2022       History     Chief Complaint   Patient presents with    Fall     Pt had trip and fall at home. -LOC, -bloodthinners. Left shoulder deformity and L eyebrow abrasion noted. 2+ radial pulses bilaterally.      Patient is a 82-year-old male who presents to the emergency department after a fall with left shoulder pain.  Patient reports he was getting out of bed quickly to answer the door when the oden hour when he tripped and fell onto his left side.  Patient reports that he fell face forward and hit his head.  He denies any loss of consciousness.  He was able to get up immediately after the fall.  He is currently complaining of left shoulder pain.  He denies any chest pain, nausea, vomiting, vision changes, lower extremity weakness or pain.        Review of patient's allergies indicates:  No Known Allergies  Past Medical History:   Diagnosis Date    Arthritis     Cataract     Glaucoma      Past Surgical History:   Procedure Laterality Date    CATARACT EXTRACTION W/  INTRAOCULAR LENS IMPLANT Left n/a    Multifocal IOL (Dr. Betancourt)    HERNIA REPAIR      right inguinal      Family History   Problem Relation Age of Onset    Cancer Mother 83        Colon    Cataracts Sister     Amblyopia Neg Hx     Glaucoma Neg Hx     Macular degeneration Neg Hx     Retinal detachment Neg Hx     Strabismus Neg Hx     Blindness Neg Hx      Social History     Tobacco Use    Smoking status: Never Smoker    Smokeless tobacco: Never Used   Substance Use Topics    Alcohol use: Yes     Comment: rare    Drug use: No     Review of Systems   Constitutional: Negative for fever.   HENT: Negative for sore throat.    Eyes: Negative for photophobia and visual disturbance.   Respiratory: Negative for choking, chest tightness and shortness of breath.    Cardiovascular: Negative for chest pain.   Gastrointestinal: Negative for nausea.   Genitourinary: Negative for dysuria.   Musculoskeletal: Positive for  arthralgias. Negative for back pain, neck pain and neck stiffness.   Skin: Positive for wound. Negative for rash.   Neurological: Negative for dizziness, facial asymmetry and weakness.   Hematological: Does not bruise/bleed easily.       Physical Exam     Initial Vitals [06/17/22 0321]   BP Pulse Resp Temp SpO2   (!) 176/86 60 (!) 24 98.3 °F (36.8 °C) 95 %      MAP       --         Physical Exam    Constitutional: He appears well-developed and well-nourished.   HENT:   Head: Normocephalic and atraumatic.   Eyes: EOM are normal. Pupils are equal, round, and reactive to light.   Neck: Neck supple. No JVD present.   Normal range of motion.  Cardiovascular: Normal rate, regular rhythm, normal heart sounds and intact distal pulses.   Pulmonary/Chest: Breath sounds normal.   Abdominal: Abdomen is soft. Bowel sounds are normal.   Musculoskeletal:         General: Tenderness present.      Right shoulder: Normal.      Left shoulder: Deformity, tenderness and bony tenderness present. Decreased range of motion.      Cervical back: Normal range of motion and neck supple.      Comments: Neurovascular intact  No cervical spine tenderness  Full range of motion in the neck     Lymphadenopathy:     He has no cervical adenopathy.   Neurological: He is alert and oriented to person, place, and time. He has normal strength and normal reflexes. GCS score is 15. GCS eye subscore is 4. GCS verbal subscore is 5. GCS motor subscore is 6.   Skin: Skin is warm and dry. Capillary refill takes less than 2 seconds.   Abrasion to the left forehead  Small skin tear on the left anterior shin           ED Course   Procedural Sedation        Date/Time: 6/17/2022 6:23 AM  Performed by: Ronel Pinon MD  Authorized by: Catrina Pack MD   ASA Class: Class 2 - Mild Illness without functional impairment.  Mallampati Score: Class 2 - Visualization of the soft palate, fauces, and uvula.   NPO STATUS:  Date/Time of last solid: 6/16/2022 6:00 PM  Date/Time  of last fluid: 6/16/2022 6:00 PM    Equipment: on cardiac monitor., on BP monitor., on CO2 monitor., on supplemental oxygen., suction available. and airway equipment available.     Sedation type: moderate (conscious) sedation    Sedatives: ketamine  Sedation start date/time: 6/17/2022 5:40 AM  Sedation end date/time: 6/17/2022 5:55 AM  Total Sedation Time (min): 15  Vitals: Vital signs were monitored during sedation.  Complications: No complications.   Patient/Family history of anesthesia or sedation complications: No  Orthopedic Injury    Date/Time: 6/17/2022 6:24 AM  Performed by: Ronel Pinon MD  Authorized by: Catrina Pack MD     Location procedure was performed:  [facility] EMERGENCY DEPARTMENT  Injury:     Injury location:  Shoulder    Injury type:  Dislocation    Dislocation type: anterior      Chronicity:  New      Pre-procedure assessment:     Neurovascular status: Neurovascularly intact      Distal perfusion: normal      Neurological function: normal      Range of motion: normal      Local anesthesia used?: Yes      Anesthesia:  Local infiltration    Local anesthetic:  Lidocaine 1% with epinephrine    Anesthetic total (ml):  7    Patient sedated?: Yes      ASA Class:  Class 1 - Heathy patient. No medical history.    Mallampati Score:  Class 1 - Visualization of the soft palate, fauces, uvula, and anterior/posterior pillars.    Sedation:  Ketamine      Selections made in this section will also lock the Injury type section above.:     Manipulation performed?: Yes      Reduction method:  External rotation and scapular manipulation    Reduction method:  External rotation and scapular manipulation    Reduction method:  External rotation and scapular manipulation    Reduction method:  External rotation and scapular manipulation    Reduction method:  External rotation and scapular manipulation    Reduction method:  External rotation and scapular manipulation    Reduction successful?: No      Complications: No       Specimens: No      Implants: No    Post-procedure assessment:     Neurovascular status: Neurovascularly intact        Labs Reviewed   CBC W/ AUTO DIFFERENTIAL - Abnormal; Notable for the following components:       Result Value    Hemoglobin 13.3 (*)     Gran # (ANC) 10.2 (*)     Immature Grans (Abs) 0.05 (*)     Gran % 83.0 (*)     Lymph % 9.9 (*)     All other components within normal limits   COMPREHENSIVE METABOLIC PANEL - Abnormal; Notable for the following components:    Glucose 137 (*)     All other components within normal limits          Imaging Results           X-Ray Shoulder Trauma Left (Final result)  Result time 06/17/22 05:26:25    Final result by Wellington Michel MD (06/17/22 05:26:25)                 Impression:      Acute anterior dislocation of the left humeral head.  There is associated comminuted fracture of the humeral head/greater tuberosity with the humeral head defect perched upon the anterior glenoid.    This report was flagged in Epic as abnormal.      Electronically signed by: Wellington Michel MD  Date:    06/17/2022  Time:    05:26             Narrative:    EXAMINATION:  XR SHOULDER TRAUMA 3 VIEW LEFT    CLINICAL HISTORY:  Unspecified fall, initial encounter    TECHNIQUE:  Three views of the left shoulder were performed.    COMPARISON  None    FINDINGS:  There is an acute anterior dislocation of the left humeral head relative the glenoid.  There is associated comminuted fracture involving the humeral head/greater tuberosity.  The humeral head defect appears likely perched upon the anterior glenoid.    The acromioclavicular joint appears maintained with mild degenerative osteoarthrosis.  No definite displaced left-sided rib fracture appreciated.  Calcified left hilar lymph nodes and a left lung granuloma.                                 Medications   propofol (DIPRIVAN) 10 mg/mL IVP (has no administration in time range)   morphine injection 4 mg (4 mg Intravenous Given 6/17/22 6920)    LIDOcaine HCL 10 mg/ml (1%) injection 10 mL (10 mLs Other Given 6/17/22 0522)   ketamine injection 62.5 mg (62.5 mg Intravenous Given 6/17/22 0559)     Medical Decision Making:   Initial Assessment:   Patient is an 82-year-old male who presented to the emergency department with left shoulder pain after fall.  Patient is alert oriented in no acute distress.  Patient is tachycardic and tachypneic likely secondary to pain.  Physical exam findings noted above.  Differential Diagnosis:   Left shoulder dislocation  Humeral head fracture  Unlikely intracranial hemorrhage  Doubt cervical spine fracture  Doubt facial fractures  Clinical Tests:   Lab Tests: Ordered and Reviewed  Radiological Study: Ordered and Reviewed  ED Management:  Patient presented after a fall.  Obvious deformity to left shoulder.  Patient given IV morphine.  X-ray obtained of the left shoulder which displayed Acute anterior dislocation of the left humeral head. There is associated comminuted fracture of the humeral head/greater tuberosity with the humeral head defect perched upon the anterior glenoid.  Lidocaine injected with minimal relief.  Discussed with patient conscious sedation and an attempt with ketamine and reduction unable to be reduced.  Consulted Ortho who will present to bedside for a repeat attempt at reduction.  CT head, cervical spine, max face ordered and pending at this time.  Patient care continued by oncoming care team.  See note for final disposition            Attending Attestation:   Physician Attestation Statement for Resident:  As the supervising MD   Physician Attestation Statement: I have personally seen and examined this patient.   I agree with the above history. -:   As the supervising MD I agree with the above PE.    As the supervising MD I agree with the above treatment, course, plan, and disposition.  I have reviewed and agree with the residents interpretation of the following: lab data and x-rays.  I have reviewed the  following: old records at this facility.                ED Course as of 06/17/22 1843 Fri Jun 17, 2022   0709 Sodium: 139 [NC]   0710 Potassium: 3.5 [NC]   0710 BUN: 17 [NC]   0710 Creatinine: 0.9 [NC]   0710 WBC: 12.33 [NC]   0710 Hemoglobin(!): 13.3 [NC]   0710 Platelets: 199 [NC]      ED Course User Index  [NC] Shaheed Stapleton MD             Clinical Impression:   Final diagnoses:  [W19.XXXA] Fall  [S00.212A] Abrasion of left eyebrow, initial encounter (Primary)  [T14.8XXA] Multiple skin tears  [M25.512] Acute pain of left shoulder  [I10] Hypertension, unspecified type  [S43.005A] Dislocation of left shoulder joint, initial encounter                 Ronel Pinon MD  Resident  06/17/22 0626       Catrina Pack MD  06/17/22 1843

## 2022-06-17 NOTE — PROCEDURES - EMERGENCY DEPT.
ED Procedure Notes:   Procedural Sedation        Date/Time: 2022 6:32 AM  Performed by: Ronel Pinon MD  Authorized by: Víctor Apodaca DO   Consent Done: Yes  Consent: Written consent obtained.  Consent given by: patient  Patient identity confirmed: MRN and   ASA Class: Class 2 - Mild Illness without functional impairment.  Mallampati Score: Class 2 - Visualization of the soft palate, fauces, and uvula.   NPO STATUS:  Date/Time of last solid: 2022 6:00 PM  Date/Time of last fluid: 2022 6:00 PM    Equipment: on cardiac monitor., on BP monitor., on CO2 monitor., on supplemental oxygen., suction available. and airway equipment available.     Sedation type: moderate (conscious) sedation    Sedatives: propofol  Sedation start date/time: 2022 6:45 AM  Sedation end date/time: 2022 6:55 AM  Total Sedation Time (min): 10  Vitals: Vital signs were monitored during sedation.  Complications: No complications.   Patient/Family history of anesthesia or sedation complications: No

## 2022-06-17 NOTE — ED NOTES
Reduction successful, pt placed in sling - pt returned to baseline of AOx4. X-ray at bedside to confirm with imaging.

## 2022-06-17 NOTE — ED NOTES
Bao Mckeon, a 82 y.o. male presents to the ED w/ complaint of     Triage note:  Chief Complaint   Patient presents with    Fall     Pt had trip and fall at home. -LOC, +bloodthinners. Left shoulder deformity and L eyebrow abrasion noted. 2+ radial pulses bilaterally.      Review of patient's allergies indicates:  No Known Allergies  Past Medical History:   Diagnosis Date    Arthritis     Cataract     Glaucoma

## 2022-06-17 NOTE — PROVIDER PROGRESS NOTES - EMERGENCY DEPT.
Encounter Date: 6/17/2022    ED Physician Progress Notes           ED Resident HAND-OFF NOTE:  6:58 AM 6/17/2022  Bao Mckeon is a 82 y.o. male who presented to the ED on 6/17/2022 and has been managed by Dr. Pinon, who reports patient C/O fall. I assumed care of patient from off-going ED physician team at 6:58 AM pending CT head/neck.    On my evaluation, Bao Mckeon appears well, hemodynamically stable and in NAD. Thus far, Bao Mckeon has received:  Medications   morphine injection 4 mg (4 mg Intravenous Given 6/17/22 0420)   LIDOcaine HCL 10 mg/ml (1%) injection 10 mL (10 mLs Other Given 6/17/22 0522)   ketamine injection 62.5 mg (62.5 mg Intravenous Given 6/17/22 0559)   propofol (DIPRIVAN) 10 mg/mL IVP (60 mg Intravenous Given 6/17/22 0639)       On my exam, I appreciate:  BP (!) 206/104   Pulse 68   Temp 98.3 °F (36.8 °C) (Oral)   Resp 20   Wt 62.6 kg (138 lb)   SpO2 98%   BMI 22.96 kg/m²     ED Course as of 06/19/22 0949   Fri Jun 17, 2022   0709 Sodium: 139 [NC]   0710 Potassium: 3.5 [NC]   0710 BUN: 17 [NC]   0710 Creatinine: 0.9 [NC]   0710 WBC: 12.33 [NC]   0710 Hemoglobin(!): 13.3 [NC]   0710 Platelets: 199 [NC]      ED Course User Index  [NC] Shaheed Stapleton MD        Disposition: shoulder is reduced by ortho, will apply coaptation sling.   I have discussed and counseled Bao Mckeon regarding exam, results, diagnosis, treatment, and plan.  ______________________  Shaheed Stapleton MD   Emergency Medicine Resident  6/17/2022

## 2022-06-17 NOTE — ASSESSMENT & PLAN NOTE
Bao Mckeon is a 82 y.o. male with a left fracture dislocation of the his shoulder.  He is closed neurovascularly intact.  He underwent with conscious sedation and glenohumeral joint reduction with the assistance of the ED staff.  Postreduction films verified adequate reduction of the glenohumeral joint.  See procedure note below for additional details.    Plan:  - Reduced in the ED under conscious sedation  - NWB LUE in a sling with abduction pillow  - Pain control per ED  - Will arrange outpatient Orthopedics follow up    Procedure note: left shoulder reduction  After time out was performed and patient ID, side, and site were verified, the patient underwent conscious sedation by the ED staff.  After adequate analgesia, the fracture dislocation was closed reduced. Post-reduction films were obtained which verified maintenance of the reduction.  The patient was placed into a sling with plans to swap that to an abduction pillow.  The patient tolerated the procedure well with no complications.

## 2022-06-17 NOTE — ED NOTES
I assume care for this patient.     LOC: The patient is awake, alert, and aware of environment. The patient is oriented x 3 and speaking appropriately.   APPEARANCE: No acute distress noted.   PSYCHOSOCIAL: Patient is calm and cooperative.   SKIN: The skin is warm, dry. Swelling, bruising, and dried blood noted to left brow area.   RESPIRATORY: Airway is open and patent. Bilateral chest rise and fall. Respirations are spontaneous, even and unlabored. Normal effort and rate noted. No accessory muscle use noted.   CARDIAC: Patient has a normal rate and rhythm on continuous cardiac monitoring.     URINARY: Voids independently. Assisted patient with urinal.   EXTREMITIES: Denies any pain to left arm or shoulder at present, rated 0/10.   PULSES: 2+ left radial pulse noted.   NEUROLOGIC: Eyes open spontaneously. Speech clear. Tolerating saliva secretions well. Able to follow commands, demonstrating ability to actively and appropriately communicate within context of current conversation. Symmetrical facial muscles. Moving all extremities, limited to left arm due to recent injury. Movement is purposeful.   MUSCULOSKELETAL: Sling noted to left arm. No obvious deformities noted.     Side rails up x2. Call light within reach. Updated on POC.

## 2022-06-17 NOTE — ED NOTES
ED MD at bedside updating pt on POC- okayed discharge home. Lyft ride home set up per , okayed for charge nurse. Pt remains AAO3, No acute distress noted. RR even and unlabored. Denies any current or new complaints. Discharge instructions reviewed with patient.

## 2022-07-18 ENCOUNTER — OFFICE VISIT (OUTPATIENT)
Dept: URGENT CARE | Facility: CLINIC | Age: 82
End: 2022-07-18
Payer: MEDICARE

## 2022-07-18 VITALS
SYSTOLIC BLOOD PRESSURE: 150 MMHG | TEMPERATURE: 98 F | RESPIRATION RATE: 17 BRPM | BODY MASS INDEX: 22.99 KG/M2 | HEART RATE: 57 BPM | DIASTOLIC BLOOD PRESSURE: 76 MMHG | HEIGHT: 65 IN | WEIGHT: 138 LBS | OXYGEN SATURATION: 97 %

## 2022-07-18 DIAGNOSIS — R05.9 COUGH: Primary | ICD-10-CM

## 2022-07-18 DIAGNOSIS — I10 HYPERTENSION, UNSPECIFIED TYPE: ICD-10-CM

## 2022-07-18 DIAGNOSIS — J06.9 ACUTE URI: ICD-10-CM

## 2022-07-18 LAB
CTP QC/QA: YES
SARS-COV-2 RDRP RESP QL NAA+PROBE: NEGATIVE

## 2022-07-18 PROCEDURE — U0002: ICD-10-PCS | Mod: QW,S$GLB,, | Performed by: FAMILY MEDICINE

## 2022-07-18 PROCEDURE — 3078F DIAST BP <80 MM HG: CPT | Mod: CPTII,S$GLB,, | Performed by: FAMILY MEDICINE

## 2022-07-18 PROCEDURE — 3077F PR MOST RECENT SYSTOLIC BLOOD PRESSURE >= 140 MM HG: ICD-10-PCS | Mod: CPTII,S$GLB,, | Performed by: FAMILY MEDICINE

## 2022-07-18 PROCEDURE — 3077F SYST BP >= 140 MM HG: CPT | Mod: CPTII,S$GLB,, | Performed by: FAMILY MEDICINE

## 2022-07-18 PROCEDURE — 3078F PR MOST RECENT DIASTOLIC BLOOD PRESSURE < 80 MM HG: ICD-10-PCS | Mod: CPTII,S$GLB,, | Performed by: FAMILY MEDICINE

## 2022-07-18 PROCEDURE — 1159F PR MEDICATION LIST DOCUMENTED IN MEDICAL RECORD: ICD-10-PCS | Mod: CPTII,S$GLB,, | Performed by: FAMILY MEDICINE

## 2022-07-18 PROCEDURE — 1160F PR REVIEW ALL MEDS BY PRESCRIBER/CLIN PHARMACIST DOCUMENTED: ICD-10-PCS | Mod: CPTII,S$GLB,, | Performed by: FAMILY MEDICINE

## 2022-07-18 PROCEDURE — 1126F PR PAIN SEVERITY QUANTIFIED, NO PAIN PRESENT: ICD-10-PCS | Mod: CPTII,S$GLB,, | Performed by: FAMILY MEDICINE

## 2022-07-18 PROCEDURE — 99213 OFFICE O/P EST LOW 20 MIN: CPT | Mod: S$GLB,,, | Performed by: FAMILY MEDICINE

## 2022-07-18 PROCEDURE — 1159F MED LIST DOCD IN RCRD: CPT | Mod: CPTII,S$GLB,, | Performed by: FAMILY MEDICINE

## 2022-07-18 PROCEDURE — 1160F RVW MEDS BY RX/DR IN RCRD: CPT | Mod: CPTII,S$GLB,, | Performed by: FAMILY MEDICINE

## 2022-07-18 PROCEDURE — 99213 PR OFFICE/OUTPT VISIT, EST, LEVL III, 20-29 MIN: ICD-10-PCS | Mod: S$GLB,,, | Performed by: FAMILY MEDICINE

## 2022-07-18 PROCEDURE — 1126F AMNT PAIN NOTED NONE PRSNT: CPT | Mod: CPTII,S$GLB,, | Performed by: FAMILY MEDICINE

## 2022-07-18 PROCEDURE — U0002 COVID-19 LAB TEST NON-CDC: HCPCS | Mod: QW,S$GLB,, | Performed by: FAMILY MEDICINE

## 2022-07-18 NOTE — PROGRESS NOTES
"Subjective:       Patient ID: Bao Mckeon is a 82 y.o. male.    Vitals:  height is 5' 5" (1.651 m) and weight is 62.6 kg (138 lb). His oral temperature is 98.3 °F (36.8 °C). His blood pressure is 150/76 (abnormal) and his pulse is 57 (abnormal). His respiration is 17 and oxygen saturation is 97%.     Chief Complaint: Cough    82 y.o male presents today c/o fatigue, sneezing and cough that started yesterday. Denies fever, chills, CP, SOB, HA, body aches, weakness/dizziness, N/V, diarrhea, abdominal pain, dysuria, loss of smell or taste.  Blood pressure is mildly elevated.  Patient did not take his blood pressure medicine today so far.     Patient is vaccinated for Covid 19 and reports exposure to Covid 19 4 days ago.     Cough  This is a new problem. The current episode started in the past 7 days. The cough is productive of sputum. Pertinent negatives include no chest pain, chills, ear congestion, fever, nasal congestion, postnasal drip, sore throat, shortness of breath or sweats. He has tried nothing for the symptoms. There is no history of asthma, bronchiectasis, bronchitis, COPD, emphysema, environmental allergies or pneumonia.       Constitution: Negative for chills and fever.   HENT: Negative for postnasal drip and sore throat.    Cardiovascular: Negative for chest pain.   Respiratory: Positive for cough. Negative for shortness of breath.    Allergic/Immunologic: Negative for environmental allergies.       Objective:      Physical Exam   Constitutional: He is oriented to person, place, and time. He appears well-developed. He is cooperative.  Non-toxic appearance. He does not appear ill. No distress.   HENT:   Head: Normocephalic and atraumatic.   Ears:   Right Ear: Hearing, tympanic membrane, external ear and ear canal normal. impacted cerumen  Left Ear: Hearing, tympanic membrane, external ear and ear canal normal. impacted cerumen  Nose: Congestion present. No mucosal edema, rhinorrhea or nasal deformity. " No epistaxis. Right sinus exhibits no maxillary sinus tenderness and no frontal sinus tenderness. Left sinus exhibits no maxillary sinus tenderness and no frontal sinus tenderness.   Mouth/Throat: Uvula is midline, oropharynx is clear and moist and mucous membranes are normal. No trismus in the jaw. Normal dentition. No uvula swelling.   Eyes: Conjunctivae and lids are normal. Right eye exhibits no discharge. Left eye exhibits no discharge. No scleral icterus.   Neck: Trachea normal and phonation normal. Neck supple.   Cardiovascular: Normal rate, regular rhythm, normal heart sounds and normal pulses.   No murmur heard.  Pulmonary/Chest: Effort normal and breath sounds normal. No stridor. No respiratory distress. He has no wheezes. He has no rhonchi. He has no rales.   Abdominal: Normal appearance and bowel sounds are normal. He exhibits no distension and no mass. Soft. There is no abdominal tenderness.   Musculoskeletal: Normal range of motion.         General: No deformity. Normal range of motion.      Cervical back: He exhibits no tenderness.   Lymphadenopathy:     He has no cervical adenopathy.   Neurological: He is alert and oriented to person, place, and time. He exhibits normal muscle tone. Coordination normal.   Skin: Skin is warm, dry, intact, not diaphoretic and not pale.   Psychiatric: His speech is normal and behavior is normal. Judgment and thought content normal.   Nursing note and vitals reviewed.        Assessment:       1. Cough    2. Hypertension, unspecified type    3. Acute URI          Plan:         Cough  -     POCT COVID-19 Rapid Screening    Hypertension, unspecified type    Acute URI

## 2022-09-09 NOTE — PROGRESS NOTES
Patient: Bao Mckeon  Date of Session: 3/15/2022  MRN: [id number]  Bao Mckeon did not attend his/her scheduled therapy appointment today and did call to cancel. Physical therapy will follow up with patient at their next scheduled visit. No charges have been posted today.       
You can access the FollowMyHealth Patient Portal offered by [facility] by registering at the following website: [email or web address]. By joining "[employer]’s FollowMyHealth portal, you will also be able to view your health information using other applications (apps) compatible with our system.

## 2022-11-21 ENCOUNTER — OFFICE VISIT (OUTPATIENT)
Dept: URGENT CARE | Facility: CLINIC | Age: 82
End: 2022-11-21
Payer: MEDICARE

## 2022-11-21 VITALS
HEIGHT: 65 IN | OXYGEN SATURATION: 99 % | HEART RATE: 58 BPM | BODY MASS INDEX: 22.99 KG/M2 | WEIGHT: 138 LBS | TEMPERATURE: 98 F | DIASTOLIC BLOOD PRESSURE: 79 MMHG | SYSTOLIC BLOOD PRESSURE: 162 MMHG | RESPIRATION RATE: 18 BRPM

## 2022-11-21 DIAGNOSIS — J34.9 SINUS PROBLEM: ICD-10-CM

## 2022-11-21 DIAGNOSIS — J06.9 VIRAL URI: Primary | ICD-10-CM

## 2022-11-21 LAB
CTP QC/QA: YES
CTP QC/QA: YES
POC MOLECULAR INFLUENZA A AGN: NEGATIVE
POC MOLECULAR INFLUENZA B AGN: NEGATIVE
SARS-COV-2 AG RESP QL IA.RAPID: NEGATIVE

## 2022-11-21 PROCEDURE — 1159F MED LIST DOCD IN RCRD: CPT | Mod: CPTII,S$GLB,, | Performed by: PHYSICIAN ASSISTANT

## 2022-11-21 PROCEDURE — 3078F DIAST BP <80 MM HG: CPT | Mod: CPTII,S$GLB,, | Performed by: PHYSICIAN ASSISTANT

## 2022-11-21 PROCEDURE — 99213 OFFICE O/P EST LOW 20 MIN: CPT | Mod: S$GLB,,, | Performed by: PHYSICIAN ASSISTANT

## 2022-11-21 PROCEDURE — 3077F SYST BP >= 140 MM HG: CPT | Mod: CPTII,S$GLB,, | Performed by: PHYSICIAN ASSISTANT

## 2022-11-21 PROCEDURE — 87811 SARS-COV-2 COVID19 W/OPTIC: CPT | Mod: QW,S$GLB,, | Performed by: PHYSICIAN ASSISTANT

## 2022-11-21 PROCEDURE — 99213 PR OFFICE/OUTPT VISIT, EST, LEVL III, 20-29 MIN: ICD-10-PCS | Mod: S$GLB,,, | Performed by: PHYSICIAN ASSISTANT

## 2022-11-21 PROCEDURE — 87502 POCT INFLUENZA A/B MOLECULAR: ICD-10-PCS | Mod: QW,S$GLB,, | Performed by: PHYSICIAN ASSISTANT

## 2022-11-21 PROCEDURE — 3078F PR MOST RECENT DIASTOLIC BLOOD PRESSURE < 80 MM HG: ICD-10-PCS | Mod: CPTII,S$GLB,, | Performed by: PHYSICIAN ASSISTANT

## 2022-11-21 PROCEDURE — 87811 SARS CORONAVIRUS 2 ANTIGEN POCT, MANUAL READ: ICD-10-PCS | Mod: QW,S$GLB,, | Performed by: PHYSICIAN ASSISTANT

## 2022-11-21 PROCEDURE — 1159F PR MEDICATION LIST DOCUMENTED IN MEDICAL RECORD: ICD-10-PCS | Mod: CPTII,S$GLB,, | Performed by: PHYSICIAN ASSISTANT

## 2022-11-21 PROCEDURE — 1160F PR REVIEW ALL MEDS BY PRESCRIBER/CLIN PHARMACIST DOCUMENTED: ICD-10-PCS | Mod: CPTII,S$GLB,, | Performed by: PHYSICIAN ASSISTANT

## 2022-11-21 PROCEDURE — 1160F RVW MEDS BY RX/DR IN RCRD: CPT | Mod: CPTII,S$GLB,, | Performed by: PHYSICIAN ASSISTANT

## 2022-11-21 PROCEDURE — 3077F PR MOST RECENT SYSTOLIC BLOOD PRESSURE >= 140 MM HG: ICD-10-PCS | Mod: CPTII,S$GLB,, | Performed by: PHYSICIAN ASSISTANT

## 2022-11-21 PROCEDURE — 87502 INFLUENZA DNA AMP PROBE: CPT | Mod: QW,S$GLB,, | Performed by: PHYSICIAN ASSISTANT

## 2022-11-21 PROCEDURE — 1126F PR PAIN SEVERITY QUANTIFIED, NO PAIN PRESENT: ICD-10-PCS | Mod: CPTII,S$GLB,, | Performed by: PHYSICIAN ASSISTANT

## 2022-11-21 PROCEDURE — 1126F AMNT PAIN NOTED NONE PRSNT: CPT | Mod: CPTII,S$GLB,, | Performed by: PHYSICIAN ASSISTANT

## 2022-11-21 RX ORDER — AMLODIPINE BESYLATE 5 MG/1
5 TABLET ORAL
COMMUNITY
Start: 2022-07-27

## 2022-11-21 RX ORDER — POTASSIUM CHLORIDE 750 MG/1
10 TABLET, EXTENDED RELEASE ORAL DAILY
COMMUNITY
Start: 2022-06-07

## 2022-11-21 RX ORDER — ZINC SULFATE 50(220)MG
1 CAPSULE ORAL DAILY
COMMUNITY
Start: 2022-06-28

## 2022-11-21 NOTE — PROGRESS NOTES
"Subjective:       Patient ID: Bao Mckeon is a 82 y.o. male.    Vitals:  height is 5' 5" (1.651 m) and weight is 62.6 kg (138 lb 0.1 oz). His temperature is 97.7 °F (36.5 °C). His blood pressure is 162/79 (abnormal) and his pulse is 58 (abnormal). His respiration is 18 and oxygen saturation is 99%.     Chief Complaint: Sinus Problem    Pt reports that yesterday he had chest congestion and was having a hard time taking deep breaths. Pt reports that he has been drinking hot water and using vicks rubs with improved symptoms.  Denies fever, chills, myalgias, ear pain, sore throat, rash, nausea, vomiting, diarrhea, chest pain or shortness of breath.  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 MEB    Sinus Problem  This is a new problem. The current episode started yesterday. There has been no fever. His pain is at a severity of 0/10. He is experiencing no pain. Associated symptoms include congestion and coughing. Pertinent negatives include no headaches, shortness of breath or sore throat. Treatments tried: hot water and vicks. The treatment provided mild relief.     Constitution: Positive for fatigue.   HENT:  Positive for congestion. Negative for sinus pain, sore throat and voice change.    Cardiovascular:  Negative for chest pain.   Respiratory:  Positive for cough. Negative for shortness of breath.    Gastrointestinal:  Negative for nausea, vomiting and diarrhea.   Musculoskeletal:  Negative for muscle ache.   Skin:  Negative for rash.   Neurological:  Negative for headaches.     Objective:      Physical Exam   Constitutional: He is oriented to person, place, and time. He appears well-developed. He is cooperative.  Non-toxic appearance. He does not appear ill. No distress.   HENT:   Head: Normocephalic and atraumatic.   Ears:   Right Ear: Hearing, tympanic membrane, external ear and ear canal normal.   Left Ear: Hearing, tympanic " membrane, external ear and ear canal normal.   Nose: Nose normal. No mucosal edema, rhinorrhea or nasal deformity. No epistaxis. Right sinus exhibits no maxillary sinus tenderness and no frontal sinus tenderness. Left sinus exhibits no maxillary sinus tenderness and no frontal sinus tenderness.   Mouth/Throat: Uvula is midline, oropharynx is clear and moist and mucous membranes are normal. No trismus in the jaw. Normal dentition. No uvula swelling. No oropharyngeal exudate, posterior oropharyngeal edema or posterior oropharyngeal erythema.   Eyes: Conjunctivae and lids are normal. No scleral icterus.   Neck: Trachea normal and phonation normal. Neck supple. No edema present. No erythema present. No neck rigidity present.   Cardiovascular: Normal rate, regular rhythm, normal heart sounds and normal pulses.   Pulmonary/Chest: Effort normal and breath sounds normal. No respiratory distress. He has no decreased breath sounds. He has no rhonchi.   Abdominal: Normal appearance.   Musculoskeletal: Normal range of motion.         General: No deformity. Normal range of motion.   Neurological: He is alert and oriented to person, place, and time. He exhibits normal muscle tone. Coordination normal.   Skin: Skin is warm, dry, intact, not diaphoretic and not pale.   Psychiatric: His speech is normal and behavior is normal. Judgment and thought content normal.   Nursing note and vitals reviewed.        Results for orders placed or performed in visit on 11/21/22   POCT Influenza A/B MOLECULAR   Result Value Ref Range    POC Molecular Influenza A Ag Negative Negative, Not Reported    POC Molecular Influenza B Ag Negative Negative, Not Reported     Acceptable Yes    SARS Coronavirus 2 Antigen, POCT Manual Read   Result Value Ref Range    SARS Coronavirus 2 Antigen Negative Negative     Acceptable Yes        Assessment:       1. Sinus problem            Plan:         Sinus problem  -     POCT Influenza  A/B MOLECULAR  -     SARS Coronavirus 2 Antigen, POCT Manual Read       Patient Instructions   Please drink plenty of fluids.    Please get plenty of rest.    Please return here or go to the Emergency Department for any concerns or worsening of condition.    If you were prescribed antibiotics, please take them to completion.    If you were prescribed a narcotic medication, do not drive or operate heavy equipment or machinery while taking these medications.    OVER THE COUNTER RECOMMENDATIONS/SUGGESTIONS.     Use Nasal Saline to mechanically move any post nasal drip from your eustachian tube or from the back of your throat.     Use warm salt water gargles to ease your throat pain. Warm salt water gargles as needed for sore throat-  1/2 tsp salt to 1 cup warm water, gargle as desired.     Use an antihistamine such as Claritin, Zyrtec or Allegra to dry you out.      Use pseudoephedrine (behind the counter) to decongest. Pseudoephedrine  30 mg up to 240 mg /day. It can raise your blood pressure and give you palpitations.    If you do not have Hypertension or any history of palpitations, it is ok to take over the counter Sudafed or Mucinex D or Allegra-D or Claritin-D or Zyrtec-D.  If you do take one of the above, it is ok to combine that with plain over the counter Mucinex or Allegra or Claritin or Zyrtec.  If for example you are taking Zyrtec -D, you can combine that with Mucinex, but not Mucinex-D.  If you are taking Mucinex-D, you can combine that with plain Allegra or Claritin or Zyrtec.     If you do have Hypertension or palpitations, it is safe to take Coricidin HBP for relief of sinus symptoms.     Use Afrin in each nare for no longer than 3 days, as it is addictive. It can also dry out your mucous membranes and cause elevated blood pressure. This is especially useful if you are flying.     Use Flonase 1-2 sprays/nostril per day. It is a local acting steroid nasal spray, if you develop a bloody nose, stop using  the medication immediately.     Sometimes Nyquil at night is beneficial to help you get some rest, however it is sedating and it does have an antihistamine, and tylenol.     Honey is a natural cough suppressant that can be used.     Tylenol up to 4,000 mg a day is safe for short periods and can be used for body aches, pain, and fever. However in high doses and prolonged use it can cause liver irritation.     Ibuprofen is a non-steroidal anti-inflammatory that can be used for body aches, pain, and fever.However it can also cause stomach irritation if over used.    If not allergic, please take over the counter Tylenol (Acetaminophen) and/or Motrin (Ibuprofen) as directed for control of pain and/or fever.    Please follow up with your primary care doctor or specialist as needed.    If you  smoke, please stop smoking.

## 2022-12-23 ENCOUNTER — OFFICE VISIT (OUTPATIENT)
Dept: URGENT CARE | Facility: CLINIC | Age: 82
End: 2022-12-23
Payer: MEDICARE

## 2022-12-23 VITALS
SYSTOLIC BLOOD PRESSURE: 162 MMHG | BODY MASS INDEX: 22.99 KG/M2 | WEIGHT: 138 LBS | RESPIRATION RATE: 18 BRPM | HEIGHT: 65 IN | OXYGEN SATURATION: 99 % | HEART RATE: 61 BPM | DIASTOLIC BLOOD PRESSURE: 84 MMHG | TEMPERATURE: 97 F

## 2022-12-23 DIAGNOSIS — J06.9 URI, ACUTE: Primary | ICD-10-CM

## 2022-12-23 DIAGNOSIS — J34.9 SINUS PROBLEM: ICD-10-CM

## 2022-12-23 LAB
CTP QC/QA: YES
SARS-COV-2 AG RESP QL IA.RAPID: NEGATIVE

## 2022-12-23 PROCEDURE — 3079F PR MOST RECENT DIASTOLIC BLOOD PRESSURE 80-89 MM HG: ICD-10-PCS | Mod: CPTII,S$GLB,,

## 2022-12-23 PROCEDURE — 3079F DIAST BP 80-89 MM HG: CPT | Mod: CPTII,S$GLB,,

## 2022-12-23 PROCEDURE — 1159F PR MEDICATION LIST DOCUMENTED IN MEDICAL RECORD: ICD-10-PCS | Mod: CPTII,S$GLB,,

## 2022-12-23 PROCEDURE — 1126F AMNT PAIN NOTED NONE PRSNT: CPT | Mod: CPTII,S$GLB,,

## 2022-12-23 PROCEDURE — 99213 PR OFFICE/OUTPT VISIT, EST, LEVL III, 20-29 MIN: ICD-10-PCS | Mod: S$GLB,,,

## 2022-12-23 PROCEDURE — 1126F PR PAIN SEVERITY QUANTIFIED, NO PAIN PRESENT: ICD-10-PCS | Mod: CPTII,S$GLB,,

## 2022-12-23 PROCEDURE — 3077F PR MOST RECENT SYSTOLIC BLOOD PRESSURE >= 140 MM HG: ICD-10-PCS | Mod: CPTII,S$GLB,,

## 2022-12-23 PROCEDURE — 87811 SARS-COV-2 COVID19 W/OPTIC: CPT | Mod: QW,S$GLB,,

## 2022-12-23 PROCEDURE — 3077F SYST BP >= 140 MM HG: CPT | Mod: CPTII,S$GLB,,

## 2022-12-23 PROCEDURE — 1160F PR REVIEW ALL MEDS BY PRESCRIBER/CLIN PHARMACIST DOCUMENTED: ICD-10-PCS | Mod: CPTII,S$GLB,,

## 2022-12-23 PROCEDURE — 87811 SARS CORONAVIRUS 2 ANTIGEN POCT, MANUAL READ: ICD-10-PCS | Mod: QW,S$GLB,,

## 2022-12-23 PROCEDURE — 1159F MED LIST DOCD IN RCRD: CPT | Mod: CPTII,S$GLB,,

## 2022-12-23 PROCEDURE — 1160F RVW MEDS BY RX/DR IN RCRD: CPT | Mod: CPTII,S$GLB,,

## 2022-12-23 PROCEDURE — 99213 OFFICE O/P EST LOW 20 MIN: CPT | Mod: S$GLB,,,

## 2022-12-23 NOTE — PATIENT INSTRUCTIONS
- Rest.    - Drink plenty of fluids.     - You can take over-the-counter claritin, zyrtec, allegra, or xyzal as directed. These are antihistamines that can help with runny nose, nasal congestion, sneezing, and helps to dry up post-nasal drip, which usually causes sore throat and cough.    - A Neti Pot with sterile saline can help break up nasal congestion and give relief.      - Warm salt water gargles can help with sore throat     - Warm tea with honey can help with sore throat and cough. Honey is a natural cough suppressant.    - You must understand that you have received an Urgent Care treatment only and that you may be released before all of your medical problems are known or treated.   - You, the patient, will arrange for follow up care as instructed.   - If your condition worsens or fails to improve we recommend that you receive another evaluation at the ER immediately or contact your PCP to discuss your concerns or return here.   - Follow up with your PCP or specialty clinic as directed in the next 1-2 weeks if not improved or as needed.  You can call [phone] to schedule an appointment with the appropriate provider.    If your symptoms do not improve or worsen, go to the emergency room immediately.

## 2022-12-23 NOTE — PROGRESS NOTES
"Subjective:       Patient ID: Bao Mckeon is a 82 y.o. male.    Vitals:  height is 5' 5" (1.651 m) and weight is 62.6 kg (138 lb). His temperature is 97.4 °F (36.3 °C). His blood pressure is 194/80 (abnormal) and his pulse is 61. His respiration is 18 and oxygen saturation is 99%.     Chief Complaint: Sinus Problem    Patient presents today for coughing up phlegm in the mornings that started 1 week ago. He states that the phlegm was yellow in color. He states it was worse 1 week ago and then this morning he noticed it again. Denies taking anything for symptoms. He would like a COVID test.     Sinus Problem  This is a new problem. The current episode started in the past 7 days. The problem is unchanged. There has been no fever. His pain is at a severity of 0/10. He is experiencing no pain. Associated symptoms include congestion and coughing. Pertinent negatives include no chills, diaphoresis, ear pain, headaches, hoarse voice, neck pain, shortness of breath, sinus pressure, sneezing, sore throat or swollen glands. Past treatments include nothing. The treatment provided no relief.     Constitution: Negative for chills, sweating, fatigue and fever.   HENT:  Positive for congestion. Negative for ear pain, postnasal drip, sinus pain, sinus pressure and sore throat.    Neck: Negative for neck pain.   Eyes:  Negative for eye pain and eye redness.   Respiratory:  Positive for cough. Negative for shortness of breath.    Gastrointestinal:  Negative for nausea, vomiting, constipation and diarrhea.   Musculoskeletal:  Negative for muscle ache.   Skin:  Negative for pale, rash and hives.   Allergic/Immunologic: Negative for hives, itching and sneezing.   Neurological:  Negative for headaches, disorientation and altered mental status.   Psychiatric/Behavioral:  Negative for altered mental status and disorientation.      Objective:      Physical Exam   Constitutional: He is oriented to person, place, and time. He appears " well-developed. He is cooperative.  Non-toxic appearance. He does not appear ill. No distress.      Comments:Patient sits comfortably in exam chair. Answers questions in complete sentences. Does not show any signs of distress or discoloration.        HENT:   Head: Normocephalic and atraumatic.   Ears:   Right Ear: Hearing, tympanic membrane, external ear and ear canal normal.   Left Ear: Hearing, tympanic membrane, external ear and ear canal normal.   Nose: Nose normal. No mucosal edema, rhinorrhea or nasal deformity. No epistaxis. Right sinus exhibits no maxillary sinus tenderness and no frontal sinus tenderness. Left sinus exhibits no maxillary sinus tenderness and no frontal sinus tenderness.   Mouth/Throat: Uvula is midline, oropharynx is clear and moist and mucous membranes are normal. No trismus in the jaw. Normal dentition. No uvula swelling. No oropharyngeal exudate, posterior oropharyngeal edema or posterior oropharyngeal erythema.   Eyes: Conjunctivae and lids are normal. No scleral icterus.   Neck: Trachea normal and phonation normal. Neck supple. No edema present. No erythema present. No neck rigidity present.   Cardiovascular: Normal rate, regular rhythm, normal heart sounds and normal pulses.   Pulmonary/Chest: Effort normal and breath sounds normal. No stridor. No respiratory distress. He has no decreased breath sounds. He has no wheezes. He has no rhonchi. He has no rales.   Abdominal: Normal appearance.   Musculoskeletal: Normal range of motion.         General: No deformity. Normal range of motion.   Neurological: He is alert and oriented to person, place, and time. He exhibits normal muscle tone. Coordination normal.   Skin: Skin is warm, dry, intact, not diaphoretic and not pale.   Psychiatric: His speech is normal and behavior is normal. Judgment and thought content normal.   Nursing note and vitals reviewed.      Results for orders placed or performed in visit on 12/23/22   SARS Coronavirus 2  Antigen, POCT Manual Read   Result Value Ref Range    SARS Coronavirus 2 Antigen Negative Negative     Acceptable Yes        Assessment:       1. URI, acute    2. Sinus problem            Plan:         URI, acute    Sinus problem  -     SARS Coronavirus 2 Antigen, POCT Manual Read                 Patient Instructions   - Rest.    - Drink plenty of fluids.     - You can take over-the-counter claritin, zyrtec, allegra, or xyzal as directed. These are antihistamines that can help with runny nose, nasal congestion, sneezing, and helps to dry up post-nasal drip, which usually causes sore throat and cough.    - A Neti Pot with sterile saline can help break up nasal congestion and give relief.      - Warm salt water gargles can help with sore throat     - Warm tea with honey can help with sore throat and cough. Honey is a natural cough suppressant.    - You must understand that you have received an Urgent Care treatment only and that you may be released before all of your medical problems are known or treated.   - You, the patient, will arrange for follow up care as instructed.   - If your condition worsens or fails to improve we recommend that you receive another evaluation at the ER immediately or contact your PCP to discuss your concerns or return here.   - Follow up with your PCP or specialty clinic as directed in the next 1-2 weeks if not improved or as needed.  You can call [phone] to schedule an appointment with the appropriate provider.    If your symptoms do not improve or worsen, go to the emergency room immediately.

## 2023-01-07 ENCOUNTER — OFFICE VISIT (OUTPATIENT)
Dept: URGENT CARE | Facility: CLINIC | Age: 83
End: 2023-01-07
Payer: MEDICARE

## 2023-01-07 VITALS
WEIGHT: 138 LBS | SYSTOLIC BLOOD PRESSURE: 176 MMHG | TEMPERATURE: 103 F | BODY MASS INDEX: 22.99 KG/M2 | OXYGEN SATURATION: 96 % | HEIGHT: 65 IN | RESPIRATION RATE: 18 BRPM | DIASTOLIC BLOOD PRESSURE: 87 MMHG | HEART RATE: 68 BPM

## 2023-01-07 DIAGNOSIS — U07.1 COVID-19 VIRUS INFECTION: Primary | ICD-10-CM

## 2023-01-07 LAB
CTP QC/QA: YES
CTP QC/QA: YES
POC MOLECULAR INFLUENZA A AGN: NEGATIVE
POC MOLECULAR INFLUENZA B AGN: NEGATIVE
SARS-COV-2 AG RESP QL IA.RAPID: POSITIVE

## 2023-01-07 PROCEDURE — 87502 POCT INFLUENZA A/B MOLECULAR: ICD-10-PCS | Mod: QW,S$GLB,, | Performed by: PHYSICIAN ASSISTANT

## 2023-01-07 PROCEDURE — 99213 PR OFFICE/OUTPT VISIT, EST, LEVL III, 20-29 MIN: ICD-10-PCS | Mod: S$GLB,CS,, | Performed by: PHYSICIAN ASSISTANT

## 2023-01-07 PROCEDURE — 87502 INFLUENZA DNA AMP PROBE: CPT | Mod: QW,S$GLB,, | Performed by: PHYSICIAN ASSISTANT

## 2023-01-07 PROCEDURE — 99213 OFFICE O/P EST LOW 20 MIN: CPT | Mod: S$GLB,CS,, | Performed by: PHYSICIAN ASSISTANT

## 2023-01-07 PROCEDURE — 1160F PR REVIEW ALL MEDS BY PRESCRIBER/CLIN PHARMACIST DOCUMENTED: ICD-10-PCS | Mod: CPTII,S$GLB,, | Performed by: PHYSICIAN ASSISTANT

## 2023-01-07 PROCEDURE — 1126F AMNT PAIN NOTED NONE PRSNT: CPT | Mod: CPTII,S$GLB,, | Performed by: PHYSICIAN ASSISTANT

## 2023-01-07 PROCEDURE — 1160F RVW MEDS BY RX/DR IN RCRD: CPT | Mod: CPTII,S$GLB,, | Performed by: PHYSICIAN ASSISTANT

## 2023-01-07 PROCEDURE — 3079F DIAST BP 80-89 MM HG: CPT | Mod: CPTII,S$GLB,, | Performed by: PHYSICIAN ASSISTANT

## 2023-01-07 PROCEDURE — 3079F PR MOST RECENT DIASTOLIC BLOOD PRESSURE 80-89 MM HG: ICD-10-PCS | Mod: CPTII,S$GLB,, | Performed by: PHYSICIAN ASSISTANT

## 2023-01-07 PROCEDURE — 87811 SARS-COV-2 COVID19 W/OPTIC: CPT | Mod: QW,S$GLB,, | Performed by: PHYSICIAN ASSISTANT

## 2023-01-07 PROCEDURE — 3077F PR MOST RECENT SYSTOLIC BLOOD PRESSURE >= 140 MM HG: ICD-10-PCS | Mod: CPTII,S$GLB,, | Performed by: PHYSICIAN ASSISTANT

## 2023-01-07 PROCEDURE — 1126F PR PAIN SEVERITY QUANTIFIED, NO PAIN PRESENT: ICD-10-PCS | Mod: CPTII,S$GLB,, | Performed by: PHYSICIAN ASSISTANT

## 2023-01-07 PROCEDURE — 1159F PR MEDICATION LIST DOCUMENTED IN MEDICAL RECORD: ICD-10-PCS | Mod: CPTII,S$GLB,, | Performed by: PHYSICIAN ASSISTANT

## 2023-01-07 PROCEDURE — 3077F SYST BP >= 140 MM HG: CPT | Mod: CPTII,S$GLB,, | Performed by: PHYSICIAN ASSISTANT

## 2023-01-07 PROCEDURE — 1159F MED LIST DOCD IN RCRD: CPT | Mod: CPTII,S$GLB,, | Performed by: PHYSICIAN ASSISTANT

## 2023-01-07 PROCEDURE — 87811 SARS CORONAVIRUS 2 ANTIGEN POCT, MANUAL READ: ICD-10-PCS | Mod: QW,S$GLB,, | Performed by: PHYSICIAN ASSISTANT

## 2023-01-07 RX ORDER — ZINC SULFATE 50(220)MG
1 CAPSULE ORAL DAILY
COMMUNITY
Start: 2022-06-28

## 2023-01-07 NOTE — PROGRESS NOTES
"Subjective:       Patient ID: Bao Mckeon is a 82 y.o. male.    Vitals:  height is 5' 5" (1.651 m) and weight is 62.6 kg (138 lb 0.1 oz). His temperature is 102.5 °F (39.2 °C) (abnormal). His blood pressure is 176/87 (abnormal) and his pulse is 68. His respiration is 18 and oxygen saturation is 96%.     Chief Complaint: Cough    Pt is an 83 yo male who presents for fever, cough that began yesterday.  Pt reports that yesterday he was having a productive cough but it has gotten better. Pt reports that he didn't take any OTC medication for his symptoms. No cp/dyspnea. No new sore throat or congestion.     Cough  This is a new problem. The current episode started yesterday. The problem has been gradually improving. The problem occurs constantly. The cough is Non-productive. Associated symptoms include a fever. Pertinent negatives include no chest pain, chills, ear congestion, ear pain, eye redness, headaches, heartburn, hemoptysis, myalgias, nasal congestion, postnasal drip, rash, rhinorrhea, sore throat, shortness of breath, sweats, weight loss or wheezing. He has tried nothing for the symptoms. The treatment provided no relief. There is no history of asthma, bronchitis or COPD.     Constitution: Positive for fatigue and fever. Negative for appetite change, chills, sweating and unexpected weight change.   HENT:  Negative for ear pain, postnasal drip and sore throat.    Neck: Negative for neck pain and neck stiffness.   Cardiovascular:  Negative for chest pain, leg swelling, palpitations and sob on exertion.   Eyes:  Negative for eye itching, eye pain and eye redness.   Respiratory:  Positive for cough and sputum production. Negative for chest tightness, bloody sputum, shortness of breath and wheezing.    Gastrointestinal:  Negative for abdominal pain, nausea, vomiting, diarrhea and heartburn.   Genitourinary:  Negative for dysuria, frequency, urgency and flank pain.   Musculoskeletal:  Negative for joint pain and " muscle ache.   Skin:  Negative for color change and rash.   Neurological:  Negative for dizziness, light-headedness, headaches, numbness and tingling.     Objective:      Physical Exam   Constitutional: He is oriented to person, place, and time. He appears well-developed. He is cooperative.  Non-toxic appearance. He does not appear ill. No distress.   HENT:   Head: Normocephalic and atraumatic.   Ears:   Right Ear: Hearing and external ear normal.   Left Ear: Hearing and external ear normal.   Nose: Nose normal. No mucosal edema, rhinorrhea or nasal deformity. No epistaxis.   Mouth/Throat: Uvula is midline, oropharynx is clear and moist and mucous membranes are normal. No trismus in the jaw. Normal dentition. No uvula swelling. No oropharyngeal exudate, posterior oropharyngeal edema or posterior oropharyngeal erythema. No tonsillar exudate.   Eyes: Conjunctivae and lids are normal. No scleral icterus.   Neck: Trachea normal and phonation normal. Neck supple. No edema present. No erythema present. No neck rigidity present.   Cardiovascular: Normal rate, regular rhythm, normal heart sounds and normal pulses.   Pulmonary/Chest: Effort normal and breath sounds normal. No accessory muscle usage or stridor. No tachypnea and no bradypnea. No respiratory distress. He has no decreased breath sounds. He has no wheezes. He has no rhonchi. He has no rales.   Lungs CTAB.  No evidence of respiratory distress.         Comments: Lungs CTAB.  No evidence of respiratory distress.    Abdominal: Normal appearance.   Musculoskeletal: Normal range of motion.         General: No deformity. Normal range of motion.   Lymphadenopathy:     He has no cervical adenopathy.   Neurological: He is alert and oriented to person, place, and time. He exhibits normal muscle tone. Coordination normal.   Skin: Skin is warm, dry, intact, not diaphoretic and not pale.   Psychiatric: His speech is normal and behavior is normal. Judgment and thought content  normal.   Nursing note and vitals reviewed.        Results for orders placed or performed in visit on 01/07/23   SARS Coronavirus 2 Antigen, POCT Manual Read   Result Value Ref Range    SARS Coronavirus 2 Antigen Positive (A) Negative     Acceptable Yes    POCT Influenza A/B MOLECULAR   Result Value Ref Range    POC Molecular Influenza A Ag Negative Negative, Not Reported    POC Molecular Influenza B Ag Negative Negative, Not Reported     Acceptable Yes        Assessment:       1. COVID-19 virus infection        - counseled patient and answered questions in regards to COVID-19 testing and diagnosis and quarantine. Discussed home care and follow up precautions.     - discussed and recommended paxlovid and patient does meet criteria. Informed about paxlovid, and patient declined taking this medication.     - Discussed ddx, home care, tx options, and given follow up precautions.       Plan:         COVID-19 virus infection  -     SARS Coronavirus 2 Antigen, POCT Manual Read  -     POCT Influenza A/B MOLECULAR      Patient Instructions     You have tested positive for COVID-19 today.      ISOLATION    If you tested positive and do not have symptoms, you must isolate for 5 days starting on the day of the positive test.     If you tested positive and have symptoms, you must isolate for 5 days starting on the day of the first symptoms,  not the day of the positive test.    This is the most important part: both the CDC and the [facility] emphasize that you do not test out of isolation.    After 5 days, if your symptoms have improved and you have not had fever on day 5, you can return to the community on day 6- NO TESTING REQUIRED! You must continue to wear mask on days 6-10.    In fact, we do not retest if you were positive in the last 90 days.    After your 5 days of isolation are completed, the CDC recommends strict mask use for the first 5 days that you come out of isolation.       - Rest.    -  Drink plenty of fluids.  - Viral upper respiratory infections typically run their course in 10-14 days.     - Tylenol as directed as needed for fever/pain. Avoid tylenol if you have a history of liver disease. Do not take ibuprofen if you have a history of GI bleeding, kidney disease, or if you take blood thinners.     - you can take plain Mucinex (guaifenesin) 1200 mg twice a day to help loosen mucous.     -warm salt water gargles can help with sore throat    - warm tea with honey can help with cough. Honey is a natural cough suppressant.    - Dextromethorphan (DM) is a cough suppressant over the counter (ie. mucinex DM, robitussin, delsym; dayquil/nyquil has DM as well.)    - Follow up with your PCP or specialty clinic as directed in the next 1-2 weeks if not improved or as needed.  You can call [phone] to schedule an appointment with the appropriate provider.      - Go to the ER if you develop new or worsening symptoms.     - You must understand that you have received an Urgent Care treatment only and that you may be released before all of your medical problems are known or treated.   - You, the patient, will arrange for follow up care as instructed.   - If your condition worsens or fails to improve we recommend that you receive another evaluation at the ER immediately or contact your PCP to discuss your concerns or return here.

## 2023-01-07 NOTE — PATIENT INSTRUCTIONS
You have tested positive for COVID-19 today.      ISOLATION    If you tested positive and do not have symptoms, you must isolate for 5 days starting on the day of the positive test.     If you tested positive and have symptoms, you must isolate for 5 days starting on the day of the first symptoms,  not the day of the positive test.    This is the most important part: both the CDC and the [facility] emphasize that you do not test out of isolation.    After 5 days, if your symptoms have improved and you have not had fever on day 5, you can return to the community on day 6- NO TESTING REQUIRED! You must continue to wear mask on days 6-10.    In fact, we do not retest if you were positive in the last 90 days.    After your 5 days of isolation are completed, the CDC recommends strict mask use for the first 5 days that you come out of isolation.       - Rest.    - Drink plenty of fluids.  - Viral upper respiratory infections typically run their course in 10-14 days.     - Tylenol as directed as needed for fever/pain. Avoid tylenol if you have a history of liver disease. Do not take ibuprofen if you have a history of GI bleeding, kidney disease, or if you take blood thinners.     - you can take plain Mucinex (guaifenesin) 1200 mg twice a day to help loosen mucous.     -warm salt water gargles can help with sore throat    - warm tea with honey can help with cough. Honey is a natural cough suppressant.    - Dextromethorphan (DM) is a cough suppressant over the counter (ie. mucinex DM, robitussin, delsym; dayquil/nyquil has DM as well.)    - Follow up with your PCP or specialty clinic as directed in the next 1-2 weeks if not improved or as needed.  You can call [phone] to schedule an appointment with the appropriate provider.      - Go to the ER if you develop new or worsening symptoms.     - You must understand that you have received an Urgent Care treatment only and that you may be released before all of your medical  problems are known or treated.   - You, the patient, will arrange for follow up care as instructed.   - If your condition worsens or fails to improve we recommend that you receive another evaluation at the ER immediately or contact your PCP to discuss your concerns or return here.

## 2023-02-10 ENCOUNTER — OFFICE VISIT (OUTPATIENT)
Dept: URGENT CARE | Facility: CLINIC | Age: 83
End: 2023-02-10
Payer: MEDICARE

## 2023-02-10 VITALS
WEIGHT: 138 LBS | BODY MASS INDEX: 22.99 KG/M2 | OXYGEN SATURATION: 97 % | RESPIRATION RATE: 18 BRPM | SYSTOLIC BLOOD PRESSURE: 149 MMHG | DIASTOLIC BLOOD PRESSURE: 67 MMHG | TEMPERATURE: 98 F | HEIGHT: 65 IN | HEART RATE: 62 BPM

## 2023-02-10 DIAGNOSIS — R05.9 COUGH, UNSPECIFIED TYPE: ICD-10-CM

## 2023-02-10 DIAGNOSIS — J06.9 VIRAL URI: Primary | ICD-10-CM

## 2023-02-10 PROBLEM — S43.015A ANTERIOR DISLOCATION OF LEFT HUMERUS: Status: ACTIVE | Noted: 2023-02-10

## 2023-02-10 PROBLEM — Z65.9 PROBLEM RELATED TO UNSPECIFIED PSYCHOSOCIAL CIRCUMSTANCES: Status: ACTIVE | Noted: 2023-02-10

## 2023-02-10 PROBLEM — M84.412S: Status: ACTIVE | Noted: 2023-02-10

## 2023-02-10 PROBLEM — H90.3 SENSORINEURAL HEARING LOSS, BILATERAL: Status: ACTIVE | Noted: 2023-02-10

## 2023-02-10 PROBLEM — H40.9 GLAUCOMA: Status: ACTIVE | Noted: 2023-02-10

## 2023-02-10 PROBLEM — N40.1 LOWER URINARY TRACT SYMPTOMS DUE TO BENIGN PROSTATIC HYPERPLASIA: Status: ACTIVE | Noted: 2023-02-10

## 2023-02-10 PROCEDURE — 3078F PR MOST RECENT DIASTOLIC BLOOD PRESSURE < 80 MM HG: ICD-10-PCS | Mod: CPTII,S$GLB,, | Performed by: FAMILY MEDICINE

## 2023-02-10 PROCEDURE — 99213 PR OFFICE/OUTPT VISIT, EST, LEVL III, 20-29 MIN: ICD-10-PCS | Mod: S$GLB,,, | Performed by: FAMILY MEDICINE

## 2023-02-10 PROCEDURE — 1159F PR MEDICATION LIST DOCUMENTED IN MEDICAL RECORD: ICD-10-PCS | Mod: CPTII,S$GLB,, | Performed by: FAMILY MEDICINE

## 2023-02-10 PROCEDURE — 1159F MED LIST DOCD IN RCRD: CPT | Mod: CPTII,S$GLB,, | Performed by: FAMILY MEDICINE

## 2023-02-10 PROCEDURE — 87502 POCT INFLUENZA A/B MOLECULAR: ICD-10-PCS | Mod: QW,S$GLB,, | Performed by: FAMILY MEDICINE

## 2023-02-10 PROCEDURE — 1160F PR REVIEW ALL MEDS BY PRESCRIBER/CLIN PHARMACIST DOCUMENTED: ICD-10-PCS | Mod: CPTII,S$GLB,, | Performed by: FAMILY MEDICINE

## 2023-02-10 PROCEDURE — 3077F SYST BP >= 140 MM HG: CPT | Mod: CPTII,S$GLB,, | Performed by: FAMILY MEDICINE

## 2023-02-10 PROCEDURE — 3077F PR MOST RECENT SYSTOLIC BLOOD PRESSURE >= 140 MM HG: ICD-10-PCS | Mod: CPTII,S$GLB,, | Performed by: FAMILY MEDICINE

## 2023-02-10 PROCEDURE — 87811 SARS-COV-2 COVID19 W/OPTIC: CPT | Mod: QW,S$GLB,, | Performed by: FAMILY MEDICINE

## 2023-02-10 PROCEDURE — 3078F DIAST BP <80 MM HG: CPT | Mod: CPTII,S$GLB,, | Performed by: FAMILY MEDICINE

## 2023-02-10 PROCEDURE — 1126F AMNT PAIN NOTED NONE PRSNT: CPT | Mod: CPTII,S$GLB,, | Performed by: FAMILY MEDICINE

## 2023-02-10 PROCEDURE — 87811 SARS CORONAVIRUS 2 ANTIGEN POCT, MANUAL READ: ICD-10-PCS | Mod: QW,S$GLB,, | Performed by: FAMILY MEDICINE

## 2023-02-10 PROCEDURE — 1126F PR PAIN SEVERITY QUANTIFIED, NO PAIN PRESENT: ICD-10-PCS | Mod: CPTII,S$GLB,, | Performed by: FAMILY MEDICINE

## 2023-02-10 PROCEDURE — 87502 INFLUENZA DNA AMP PROBE: CPT | Mod: QW,S$GLB,, | Performed by: FAMILY MEDICINE

## 2023-02-10 PROCEDURE — 1160F RVW MEDS BY RX/DR IN RCRD: CPT | Mod: CPTII,S$GLB,, | Performed by: FAMILY MEDICINE

## 2023-02-10 PROCEDURE — 99213 OFFICE O/P EST LOW 20 MIN: CPT | Mod: S$GLB,,, | Performed by: FAMILY MEDICINE

## 2023-02-10 NOTE — PROGRESS NOTES
"Subjective:       Patient ID: Bao Mckeon is a 82 y.o. male.    Vitals:  height is 5' 5" (1.651 m) and weight is 62.6 kg (138 lb 0.1 oz). His temperature is 98.3 °F (36.8 °C). His blood pressure is 149/67 (abnormal) and his pulse is 62. His respiration is 18 and oxygen saturation is 97%.     Chief Complaint: Cough    Pt reports that he had Covid a month ago and three days he has been having symptoms. Pt reports that he has been cough, fatigue, post nasal drip. Pt reports that he didn't take any OTC medication. He didn't take paxlovid with his last bout of Covid. He doesn't feel there is enough data to trust it. He is vaccinated for covid and had 2 boosters.    Cough  This is a new problem. The current episode started in the past 7 days (3 days). The problem has been unchanged. The cough is Productive of sputum. Associated symptoms include nasal congestion and postnasal drip. Pertinent negatives include no chest pain, chills, ear congestion, ear pain, fever, headaches, heartburn, hemoptysis, myalgias, rash, rhinorrhea, sore throat, shortness of breath, sweats, weight loss or wheezing. He has tried nothing for the symptoms. The treatment provided no relief.     Constitution: Negative for chills and fever.   HENT:  Positive for postnasal drip. Negative for ear pain and sore throat.    Cardiovascular:  Negative for chest pain.   Respiratory:  Positive for cough. Negative for bloody sputum, shortness of breath and wheezing.    Gastrointestinal:  Negative for heartburn.   Musculoskeletal:  Negative for muscle ache.   Skin:  Negative for rash.   Neurological:  Negative for headaches.     Objective:      Physical Exam   Constitutional: He is oriented to person, place, and time. He appears well-developed. He is cooperative.  Non-toxic appearance. He does not appear ill. No distress.   HENT:   Head: Normocephalic and atraumatic.   Ears:   Right Ear: Hearing, tympanic membrane, external ear and ear canal normal.   Left " Ear: Hearing, tympanic membrane, external ear and ear canal normal.   Nose: Nose normal. No mucosal edema, rhinorrhea or nasal deformity. No epistaxis. Right sinus exhibits no maxillary sinus tenderness and no frontal sinus tenderness. Left sinus exhibits no maxillary sinus tenderness and no frontal sinus tenderness.   Mouth/Throat: Uvula is midline, oropharynx is clear and moist and mucous membranes are normal. Mucous membranes are moist. No trismus in the jaw. Normal dentition. No uvula swelling. No oropharyngeal exudate, posterior oropharyngeal edema or posterior oropharyngeal erythema. Oropharynx is clear.   Eyes: Conjunctivae and lids are normal. No scleral icterus.   Neck: Trachea normal and phonation normal. Neck supple. No edema present. No erythema present. No neck rigidity present.   Cardiovascular: Normal rate, regular rhythm, normal heart sounds and normal pulses.   Pulmonary/Chest: Effort normal and breath sounds normal. No respiratory distress. He has no decreased breath sounds. He has no rhonchi.   Abdominal: Normal appearance.   Musculoskeletal: Normal range of motion.         General: No deformity. Normal range of motion.   Neurological: He is alert and oriented to person, place, and time. He exhibits normal muscle tone. Coordination normal.   Skin: Skin is warm, dry, intact, not diaphoretic and not pale.   Psychiatric: His speech is normal and behavior is normal. Judgment and thought content normal.   Nursing note and vitals reviewed.      Assessment:       1. Viral URI    2. Cough, unspecified type            Plan:         Viral URI    Cough, unspecified type  -     SARS Coronavirus 2 Antigen, POCT Manual Read  -     POCT Influenza A/B MOLECULAR       Pt or guardian provided educational materials and instructions regarding their visit diagnosis.

## 2023-02-10 NOTE — PATIENT INSTRUCTIONS
Symptomatic treatment:    Alternate Tylenol and Ibuprofen every 3 hrs  salt water gargles to soothe throat  Honey/lemon water to soothe throat  Cepachol helps to numb the discomfort in throat  Nasal saline spray reduces inflammation and dryness  Warm face compresses/hot showers as often as you can to open sinuses   Flonase OTC or Nasacort OTC  Simple foods like chicken noodle soup help hydrate  Delsym helps with coughing at night or mucinex DM(not D)     Rest as much as you can  Your symptoms will likely last 5-7 days, maybe longer depending on how it affects your body.    You may be contagious so minimize contact with others to reduce the spread to others and stay home from work or school if necessary as we discussed. Dehydration is preventable but is one of the main reasons why you will feel so badly. Drink pedialyte, gatorade or propel. Stay hydrated.    Antibiotics are not needed unless a complication(such as Otitis Media, Bacterial sinus infection or pneumonia). Taking antibiotics for Flu/Cold is not supported by evidencebased medicine and can expose you to unnecessary side effects of the medication, such as anaphylaxis.   If you experience any:  Chest pain, shortness of breath, wheezing or difficulty breathing  Severe headache, face, neck or ear pain  New rash  Fever over 101.5º F (38.6 C) for more than three days  Confusion, behavior change or seizure  Severe weakness or dizziness  Go to ER

## 2023-04-19 ENCOUNTER — OFFICE VISIT (OUTPATIENT)
Dept: UROLOGY | Facility: CLINIC | Age: 83
End: 2023-04-19
Payer: MEDICARE

## 2023-04-19 VITALS
SYSTOLIC BLOOD PRESSURE: 133 MMHG | BODY MASS INDEX: 22.74 KG/M2 | HEART RATE: 58 BPM | WEIGHT: 136.5 LBS | HEIGHT: 65 IN | DIASTOLIC BLOOD PRESSURE: 72 MMHG

## 2023-04-19 DIAGNOSIS — I10 PRIMARY HYPERTENSION: ICD-10-CM

## 2023-04-19 DIAGNOSIS — N40.1 BENIGN NON-NODULAR PROSTATIC HYPERPLASIA WITH LOWER URINARY TRACT SYMPTOMS: Primary | ICD-10-CM

## 2023-04-19 DIAGNOSIS — R35.1 NOCTURIA: ICD-10-CM

## 2023-04-19 PROCEDURE — 1159F MED LIST DOCD IN RCRD: CPT | Mod: CPTII,S$GLB,, | Performed by: UROLOGY

## 2023-04-19 PROCEDURE — 1159F PR MEDICATION LIST DOCUMENTED IN MEDICAL RECORD: ICD-10-PCS | Mod: CPTII,S$GLB,, | Performed by: UROLOGY

## 2023-04-19 PROCEDURE — 1101F PT FALLS ASSESS-DOCD LE1/YR: CPT | Mod: CPTII,S$GLB,, | Performed by: UROLOGY

## 2023-04-19 PROCEDURE — 99204 OFFICE O/P NEW MOD 45 MIN: CPT | Mod: S$GLB,,, | Performed by: UROLOGY

## 2023-04-19 PROCEDURE — 1160F PR REVIEW ALL MEDS BY PRESCRIBER/CLIN PHARMACIST DOCUMENTED: ICD-10-PCS | Mod: CPTII,S$GLB,, | Performed by: UROLOGY

## 2023-04-19 PROCEDURE — 99999 PR PBB SHADOW E&M-EST. PATIENT-LVL III: CPT | Mod: PBBFAC,,, | Performed by: UROLOGY

## 2023-04-19 PROCEDURE — 99999 PR PBB SHADOW E&M-EST. PATIENT-LVL III: ICD-10-PCS | Mod: PBBFAC,,, | Performed by: UROLOGY

## 2023-04-19 PROCEDURE — 3075F PR MOST RECENT SYSTOLIC BLOOD PRESS GE 130-139MM HG: ICD-10-PCS | Mod: CPTII,S$GLB,, | Performed by: UROLOGY

## 2023-04-19 PROCEDURE — 3078F DIAST BP <80 MM HG: CPT | Mod: CPTII,S$GLB,, | Performed by: UROLOGY

## 2023-04-19 PROCEDURE — 1101F PR PT FALLS ASSESS DOC 0-1 FALLS W/OUT INJ PAST YR: ICD-10-PCS | Mod: CPTII,S$GLB,, | Performed by: UROLOGY

## 2023-04-19 PROCEDURE — 1126F AMNT PAIN NOTED NONE PRSNT: CPT | Mod: CPTII,S$GLB,, | Performed by: UROLOGY

## 2023-04-19 PROCEDURE — 3075F SYST BP GE 130 - 139MM HG: CPT | Mod: CPTII,S$GLB,, | Performed by: UROLOGY

## 2023-04-19 PROCEDURE — 99204 PR OFFICE/OUTPT VISIT, NEW, LEVL IV, 45-59 MIN: ICD-10-PCS | Mod: S$GLB,,, | Performed by: UROLOGY

## 2023-04-19 PROCEDURE — 3078F PR MOST RECENT DIASTOLIC BLOOD PRESSURE < 80 MM HG: ICD-10-PCS | Mod: CPTII,S$GLB,, | Performed by: UROLOGY

## 2023-04-19 PROCEDURE — 1126F PR PAIN SEVERITY QUANTIFIED, NO PAIN PRESENT: ICD-10-PCS | Mod: CPTII,S$GLB,, | Performed by: UROLOGY

## 2023-04-19 PROCEDURE — 3288F FALL RISK ASSESSMENT DOCD: CPT | Mod: CPTII,S$GLB,, | Performed by: UROLOGY

## 2023-04-19 PROCEDURE — 3288F PR FALLS RISK ASSESSMENT DOCUMENTED: ICD-10-PCS | Mod: CPTII,S$GLB,, | Performed by: UROLOGY

## 2023-04-19 PROCEDURE — 1160F RVW MEDS BY RX/DR IN RCRD: CPT | Mod: CPTII,S$GLB,, | Performed by: UROLOGY

## 2023-04-19 NOTE — PROGRESS NOTES
Subjective:      Patient ID: Bao Mckeon is a 83 y.o. male.    Chief Complaint: BPH    Patient is a 83 y.o. male who is new to our clinic and self-referred for evaluation of BPH.     Benign Prostatic Hypertrophy    Benign Prostatic Hypertrophy  Patient complains of lower urinary tract symptoms. He reports frequency, incomplete emptying, intermittency, nocturia three times a night, straining, urgency, and weak stream. He denies  hematuria . Patient states symptoms are of severe severity. Onset of symptoms was several years ago and was gradual in onset. His AUA Symptom Score is, 25/3. He has no personal history of prostate cancer. He reports a history of no complicating symptoms. He denies flank pain, gross hematuria, kidney stones, and recurrent UTI.    Of note, the patient is somewhat anxious about cancer diagnoses in general.  He denies any family history of prostate cancer.  States that he performs his own self digital rectal exams.    No results found for: PSA, PSADIAG, PSATOTAL, PSAFREE    IPSS Questionnaire (AUA-SS) 4/19/23:  Over the past month    1)  Incomplete Emptying - How often have you had a sensation of not emptying your bladder completely after you finish urinating?  2 - Less than half the time   2)  Frequency - How often have you had to urinate again less than two hours after you finished urinating? 4 - More than half the time   3)  Intermittency - How often have you found you stopped and started again several times when you urinated?  5 - Almost always   4) Urgency - How difficult have you found it to postpone urination?  4 - More than half the time   5) Weak Stream - How often have you had a weak urinary stream?  4 - More than half the time   6) Straining  - How often have you had to push or strain to begin urination?  2 - Less than half the time   7) Nocturia - How many times did you most typically get up to urinate from the time you went to bed until the time you got up in the morning?  3 - 3  "times   Total score:  0-7 mild, 8-19 moderate, 20-35 severe 25   Quality of Life:  3 - Mixed          Review of Systems  All other systems reviewed and negative except pertinent positives noted in HPI.      Objective:     Physical Exam  Constitutional:       General: He is not in acute distress.     Appearance: He is well-developed.   HENT:      Head: Normocephalic and atraumatic.   Eyes:      General: No scleral icterus.  Neck:      Trachea: No tracheal deviation.   Pulmonary:      Effort: Pulmonary effort is normal. No respiratory distress.   Neurological:      Mental Status: He is alert and oriented to person, place, and time.   Psychiatric:         Behavior: Behavior normal.         Thought Content: Thought content normal.         Judgment: Judgment normal.       Assessment:     1. Benign non-nodular prostatic hyperplasia with lower urinary tract symptoms    2. Nocturia    3. Primary hypertension      Plan:     1. Benign non-nodular prostatic hyperplasia with lower urinary tract symptoms    2. Nocturia    3. Primary hypertension        No orders of the defined types were placed in this encounter.    1. BPH:  -A post void residual bladder scan was performed immediately after voiding. The patient's PVR was noted to be 180cc---however, he failed the cup and then stopped voiding intentionally as he was confused with what we are asking..    The patient was counseled on the side effects of flomax which include: dizziness,drowsiness, weakness, nausea, diarrhea, headache,runny or stuffy nose chest pain, abnormal ejaculation or anejaculation, decreased amount of semen, back pain, blurred vision, an eye condition while undergoing cataract surgery known as "floppy iris syndrome", fever, chills, body aches, and flu symptoms.      -Avoid bladder irritants including but not limited to caffeine, alcohol, smoking, spicy foods, acidic foods, tomato-based products, citrus, artificial sweeteners, chocolate, coffee or " tea.    -prostate meds: none; previously on flomax but d/c'd due to cataract concerns.  Will discuss flomax with his ophthalmologist.  If his ophthalmologist clears him to take Flomax again, we will likely prescribe it.  I asked the patient to follow up with us regarding this discussion.  He states he sees his ophthalmologist in the next 2 weeks.    HTN:  -BP reviewed  -stable, continue meds and f/u with PCP

## 2023-04-24 ENCOUNTER — TELEPHONE (OUTPATIENT)
Dept: UROLOGY | Facility: HOSPITAL | Age: 83
End: 2023-04-24
Payer: MEDICARE

## 2023-04-24 RX ORDER — TAMSULOSIN HYDROCHLORIDE 0.4 MG/1
0.4 CAPSULE ORAL
Qty: 30 CAPSULE | Refills: 12 | Status: SHIPPED | OUTPATIENT
Start: 2023-04-24 | End: 2023-05-24

## 2023-04-24 NOTE — TELEPHONE ENCOUNTER
Flomax ordered and sent to pharmacy.       ----- Message from Renae Jaramillo RN sent at 4/21/2023  3:30 PM CDT -----  Regarding: Pt requests Flomax to Pharmacy  Hi Alcides, New patient Markel on 4/19/2023.  Please send Flomax Rx to pharmacy.  Thank you, Renae  ----- Message -----  From: Juliana King  Sent: 4/21/2023  10:10 AM CDT  To: [facility] Staff    SHAHRZAD SUAREZ calling regarding checked with the eye doctor to see if it was ok to take the Flomax and was cleared to take the medication, call back [phone]      [facility] #[id number] - [address]  [address] [place] [id number]  Phone: [phone] Fax: [phone]

## 2024-04-25 ENCOUNTER — TELEPHONE (OUTPATIENT)
Dept: UROLOGY | Facility: CLINIC | Age: 84
End: 2024-04-25
Payer: MEDICARE

## 2024-04-25 RX ORDER — TAMSULOSIN HYDROCHLORIDE 0.4 MG/1
0.4 CAPSULE ORAL
Qty: 90 CAPSULE | Refills: 3 | Status: SHIPPED | OUTPATIENT
Start: 2024-04-25 | End: 2025-04-20

## 2024-04-25 NOTE — TELEPHONE ENCOUNTER
Refill sent to pharmacy.      ----- Message from Ira Connor RN sent at 4/25/2024  2:40 PM CDT -----  Regarding: FW: refill  Contact: [phone]    ----- Message -----  From: Marisela Kamara  Sent: 4/25/2024   2:25 PM CDT  To: [facility] Mcgrath Staff  Subject: refill                                           Who Called: Bao    Refxavi or New Rx:refill    RX Name and Strength:tamsulosin (FLOMAX) 0.4 mg Cap    Preferred Pharmacy with phone number:[facility] #[id number] [address] Phone:[phone] Fax:[phone]  Would the patient rather a call back or a response via [facility]? Call back    Best Call Back Number: [phone]    Additional Information:

## 2024-04-26 ENCOUNTER — TELEPHONE (OUTPATIENT)
Dept: UROLOGY | Facility: CLINIC | Age: 84
End: 2024-04-26
Payer: MEDICARE

## 2024-04-26 NOTE — TELEPHONE ENCOUNTER
Already taken care of yesterday.      ----- Message from Ira Connor RN sent at 4/26/2024  7:55 AM CDT -----  Regarding: FW: refill  Contact: [phone]    ----- Message -----  From: Latha Phelan LPN  Sent: 4/25/2024   4:23 PM CDT  To: Ira Connor RN  Subject: FW: refill                                         ----- Message -----  From: Marisela Kamara  Sent: 4/25/2024   2:25 PM CDT  To: [facility] Mcgrath Staff  Subject: refill                                           Who Called: Bao    Refill or New Rx:refill    RX Name and Strength:tamsulosin (FLOMAX) 0.4 mg Cap    Preferred Pharmacy with phone number:[facility] #[id number] [address] Phone:[phone] Fax:[phone]  Would the patient rather a call back or a response via [facility]? Call back    Best Call Back Number: [phone]    Additional Information:

## 2024-09-27 ENCOUNTER — OFFICE VISIT (OUTPATIENT)
Dept: URGENT CARE | Facility: CLINIC | Age: 84
End: 2024-09-27
Payer: MEDICARE

## 2024-09-27 VITALS
WEIGHT: 136.44 LBS | HEIGHT: 65 IN | BODY MASS INDEX: 22.73 KG/M2 | DIASTOLIC BLOOD PRESSURE: 79 MMHG | SYSTOLIC BLOOD PRESSURE: 167 MMHG | HEART RATE: 58 BPM | OXYGEN SATURATION: 97 % | TEMPERATURE: 98 F | RESPIRATION RATE: 15 BRPM

## 2024-09-27 DIAGNOSIS — J06.9 UPPER RESPIRATORY TRACT INFECTION, UNSPECIFIED TYPE: Primary | ICD-10-CM

## 2024-09-27 LAB
CTP QC/QA: YES
SARS-COV-2 AG RESP QL IA.RAPID: NEGATIVE

## 2024-09-27 RX ORDER — LANOLIN ALCOHOL/MO/W.PET/CERES
1000 CREAM (GRAM) TOPICAL
COMMUNITY
Start: 2024-05-14

## 2024-09-27 NOTE — PROGRESS NOTES
"Subjective:      Patient ID: Bao Mckeon is a 84 y.o. male.    Vitals:  height is 5' 5" (1.651 m) and weight is 61.9 kg (136 lb 7.4 oz). His temperature is 97.9 °F (36.6 °C). His blood pressure is 167/79 (abnormal) and his pulse is 58 (abnormal). His respiration is 15 and oxygen saturation is 97%.     Chief Complaint: Sinus Problem    Pt states he has went 15 years without a cold  Started to feel sick 3 days ago and would like a Covid test     Sinus Problem  This is a new problem. The current episode started in the past 7 days (3 days). The problem has been gradually worsening since onset. Associated symptoms include congestion, coughing and a sore throat. (Phlegm )       HENT:  Positive for congestion and sore throat.    Respiratory:  Positive for cough.       Objective:     Physical Exam   Constitutional: He does not appear ill. No distress. normal  HENT:   Head: Normocephalic and atraumatic.   Nose: Congestion present. No rhinorrhea.   Mouth/Throat: Mucous membranes are moist. No posterior oropharyngeal erythema.   Neck: Neck supple.   Cardiovascular: Normal rate, regular rhythm, normal heart sounds and normal pulses.   Pulmonary/Chest: Effort normal and breath sounds normal.   Abdominal: Normal appearance.   Neurological: He is alert.   Nursing note and vitals reviewed.    Results for orders placed or performed in visit on 09/27/24   SARS Coronavirus 2 Antigen, POCT Manual Read   Result Value Ref Range    SARS Coronavirus 2 Antigen Negative Negative     Acceptable Yes         Assessment:     1. Upper respiratory tract infection, unspecified type        Plan:       Upper respiratory tract infection, unspecified type  -     SARS Coronavirus 2 Antigen, POCT Manual Read    Reviewed OTC cough and cold remedies. RTC prn worsening symptoms                "

## 2024-10-02 ENCOUNTER — OFFICE VISIT (OUTPATIENT)
Dept: UROLOGY | Facility: CLINIC | Age: 84
End: 2024-10-02
Payer: MEDICARE

## 2024-10-02 VITALS
DIASTOLIC BLOOD PRESSURE: 80 MMHG | WEIGHT: 136.69 LBS | SYSTOLIC BLOOD PRESSURE: 161 MMHG | HEART RATE: 61 BPM | BODY MASS INDEX: 22.77 KG/M2 | HEIGHT: 65 IN

## 2024-10-02 DIAGNOSIS — I10 PRIMARY HYPERTENSION: ICD-10-CM

## 2024-10-02 DIAGNOSIS — N40.1 BENIGN NON-NODULAR PROSTATIC HYPERPLASIA WITH LOWER URINARY TRACT SYMPTOMS: Primary | ICD-10-CM

## 2024-10-02 DIAGNOSIS — N20.0 KIDNEY STONES: ICD-10-CM

## 2024-10-02 PROCEDURE — 99214 OFFICE O/P EST MOD 30 MIN: CPT | Mod: S$GLB,,, | Performed by: UROLOGY

## 2024-10-02 PROCEDURE — 3288F FALL RISK ASSESSMENT DOCD: CPT | Mod: CPTII,S$GLB,, | Performed by: UROLOGY

## 2024-10-02 PROCEDURE — 1160F RVW MEDS BY RX/DR IN RCRD: CPT | Mod: CPTII,S$GLB,, | Performed by: UROLOGY

## 2024-10-02 PROCEDURE — 1125F AMNT PAIN NOTED PAIN PRSNT: CPT | Mod: CPTII,S$GLB,, | Performed by: UROLOGY

## 2024-10-02 PROCEDURE — 99999 PR PBB SHADOW E&M-EST. PATIENT-LVL III: CPT | Mod: PBBFAC,,, | Performed by: UROLOGY

## 2024-10-02 PROCEDURE — 1159F MED LIST DOCD IN RCRD: CPT | Mod: CPTII,S$GLB,, | Performed by: UROLOGY

## 2024-10-02 PROCEDURE — 1101F PT FALLS ASSESS-DOCD LE1/YR: CPT | Mod: CPTII,S$GLB,, | Performed by: UROLOGY

## 2024-10-02 NOTE — PROGRESS NOTES
Subjective:      Patient ID: Bao Mckeon is a 84 y.o. male.    Chief Complaint: BPH    Patient is a 84 y.o. male who is new to our clinic and self-referred for evaluation of BPH.     Benign Prostatic Hypertrophy    Flank Pain    For the past 2 or 3 weeks, he has been feeling intermittent flank pain on both sides. Sometimes simultaneously. He rates the pain 1/10. Describes it as a faint soreness. He denies hematuria, dysuria. He does have baseline frequency, incomplete emptying. He is still waking up 2-3x a night to pee.     Benign Prostatic Hypertrophy  Patient complains of lower urinary tract symptoms. He reports frequency, incomplete emptying, intermittency, nocturia three times a night, straining, urgency, and weak stream. He denies  hematuria . Patient states symptoms are of severe severity. Onset of symptoms was several years ago and was gradual in onset. His AUA Symptom Score is, 25/3. He has no personal history of prostate cancer. He reports a history of no complicating symptoms. He denies flank pain, gross hematuria, kidney stones, and recurrent UTI.    Of note, the patient is somewhat anxious about cancer diagnoses in general.  He denies any family history of prostate cancer.  States that he performs his own self digital rectal exams.    IPSS Questionnaire (AUA-SS) 4/19/23:  Over the past month    1)  Incomplete Emptying - How often have you had a sensation of not emptying your bladder completely after you finish urinating?  2 - Less than half the time   2)  Frequency - How often have you had to urinate again less than two hours after you finished urinating? 4 - More than half the time   3)  Intermittency - How often have you found you stopped and started again several times when you urinated?  5 - Almost always   4) Urgency - How difficult have you found it to postpone urination?  4 - More than half the time   5) Weak Stream - How often have you had a weak urinary stream?  4 - More than half the time  "  6) Straining  - How often have you had to push or strain to begin urination?  2 - Less than half the time   7) Nocturia - How many times did you most typically get up to urinate from the time you went to bed until the time you got up in the morning?  3 - 3 times   Total score:  0-7 mild, 8-19 moderate, 20-35 severe 25   Quality of Life:  3 - Mixed          Review of Systems   Genitourinary:  Positive for flank pain.     All other systems reviewed and negative except pertinent positives noted in HPI.      Objective:     Physical Exam  Constitutional:       General: He is not in acute distress.     Appearance: He is well-developed.   HENT:      Head: Normocephalic and atraumatic.   Eyes:      General: No scleral icterus.  Neck:      Trachea: No tracheal deviation.   Pulmonary:      Effort: Pulmonary effort is normal. No respiratory distress.   Neurological:      Mental Status: He is alert and oriented to person, place, and time.   Psychiatric:         Behavior: Behavior normal.         Thought Content: Thought content normal.         Judgment: Judgment normal.         Assessment:   BPH    Plan:       1. BPH:  -A post void residual bladder scan was performed immediately after voiding. The patient's PVR was noted to be 180cc---however, he failed the cup and then stopped voiding intentionally as he was confused with what we are asking..    The patient was counseled on the side effects of flomax which include: dizziness,drowsiness, weakness, nausea, diarrhea, headache,runny or stuffy nose chest pain, abnormal ejaculation or anejaculation, decreased amount of semen, back pain, blurred vision, an eye condition while undergoing cataract surgery known as "floppy iris syndrome", fever, chills, body aches, and flu symptoms.    -Increase flomax to twice a day    -Avoid bladder irritants including but not limited to caffeine, alcohol, smoking, spicy foods, acidic foods, tomato-based products, citrus, artificial sweeteners, " chocolate, coffee or tea.    -prostate meds: none; previously on flomax but d/c'd due to cataract concerns.  Will discuss flomax with his ophthalmologist.  If his ophthalmologist clears him to take Flomax again, we will likely prescribe it.  I asked the patient to follow up with us regarding this discussion.  He states he sees his ophthalmologist in the next 2 weeks.    2. Kidney stones:  -pain not likely renal in origin  -General risk factors for kidney stones and the conservative measures to prevent kidney stones in the future were discussed with the patient in detail.  The patient was encouraged to drink 2-3 liters of water a day, limit iced tea and tejas as well as foods high in oxalate.  They were cautioned to try to limit salt and red meat intake.  We also discussed adding citrate to the diet with the addition of franc or lemon juice to their water or alternatively with crystal light.       3. HTN:  -BP reviewed  -stable, continue meds and f/u with PCP      The patient was seen and examined with the resident physician.  All questions were answered.  The plan was discussed with the patient and I concur with the resident physician's documentation.

## 2025-02-25 RX ORDER — TAMSULOSIN HYDROCHLORIDE 0.4 MG/1
0.4 CAPSULE ORAL
Qty: 90 CAPSULE | Refills: 3 | Status: SHIPPED | OUTPATIENT
Start: 2025-02-25 | End: 2026-02-20